# Patient Record
Sex: FEMALE | Race: WHITE | NOT HISPANIC OR LATINO | Employment: OTHER | URBAN - METROPOLITAN AREA
[De-identification: names, ages, dates, MRNs, and addresses within clinical notes are randomized per-mention and may not be internally consistent; named-entity substitution may affect disease eponyms.]

---

## 2017-03-27 ENCOUNTER — ALLSCRIPTS OFFICE VISIT (OUTPATIENT)
Dept: OTHER | Facility: OTHER | Age: 67
End: 2017-03-27

## 2017-08-28 ENCOUNTER — APPOINTMENT (OUTPATIENT)
Dept: LAB | Facility: MEDICAL CENTER | Age: 67
End: 2017-08-28
Payer: MEDICARE

## 2017-08-28 ENCOUNTER — GENERIC CONVERSION - ENCOUNTER (OUTPATIENT)
Dept: OTHER | Facility: OTHER | Age: 67
End: 2017-08-28

## 2017-08-28 DIAGNOSIS — E55.9 VITAMIN D DEFICIENCY: ICD-10-CM

## 2017-08-28 DIAGNOSIS — I10 ESSENTIAL (PRIMARY) HYPERTENSION: ICD-10-CM

## 2017-08-28 LAB
ALBUMIN SERPL BCP-MCNC: 4.2 G/DL (ref 3.5–5)
ALP SERPL-CCNC: 101 U/L (ref 46–116)
ALT SERPL W P-5'-P-CCNC: 16 U/L (ref 12–78)
ANION GAP SERPL CALCULATED.3IONS-SCNC: 7 MMOL/L (ref 4–13)
AST SERPL W P-5'-P-CCNC: 12 U/L (ref 5–45)
BASOPHILS # BLD AUTO: 0.03 THOUSANDS/ΜL (ref 0–0.1)
BASOPHILS NFR BLD AUTO: 1 % (ref 0–1)
BILIRUB SERPL-MCNC: 0.61 MG/DL (ref 0.2–1)
BUN SERPL-MCNC: 19 MG/DL (ref 5–25)
CALCIUM SERPL-MCNC: 9.3 MG/DL (ref 8.3–10.1)
CHLORIDE SERPL-SCNC: 105 MMOL/L (ref 100–108)
CHOLEST SERPL-MCNC: 159 MG/DL (ref 50–200)
CO2 SERPL-SCNC: 28 MMOL/L (ref 21–32)
CREAT SERPL-MCNC: 0.8 MG/DL (ref 0.6–1.3)
EOSINOPHIL # BLD AUTO: 0.15 THOUSAND/ΜL (ref 0–0.61)
EOSINOPHIL NFR BLD AUTO: 3 % (ref 0–6)
ERYTHROCYTE [DISTWIDTH] IN BLOOD BY AUTOMATED COUNT: 12.5 % (ref 11.6–15.1)
GFR SERPL CREATININE-BSD FRML MDRD: 76 ML/MIN/1.73SQ M
GLUCOSE P FAST SERPL-MCNC: 89 MG/DL (ref 65–99)
HCT VFR BLD AUTO: 44.3 % (ref 34.8–46.1)
HDLC SERPL-MCNC: 57 MG/DL (ref 40–60)
HGB BLD-MCNC: 14.8 G/DL (ref 11.5–15.4)
LDLC SERPL CALC-MCNC: 77 MG/DL (ref 0–100)
LYMPHOCYTES # BLD AUTO: 2.14 THOUSANDS/ΜL (ref 0.6–4.47)
LYMPHOCYTES NFR BLD AUTO: 41 % (ref 14–44)
MCH RBC QN AUTO: 31.8 PG (ref 26.8–34.3)
MCHC RBC AUTO-ENTMCNC: 33.4 G/DL (ref 31.4–37.4)
MCV RBC AUTO: 95 FL (ref 82–98)
MONOCYTES # BLD AUTO: 0.46 THOUSAND/ΜL (ref 0.17–1.22)
MONOCYTES NFR BLD AUTO: 9 % (ref 4–12)
NEUTROPHILS # BLD AUTO: 2.48 THOUSANDS/ΜL (ref 1.85–7.62)
NEUTS SEG NFR BLD AUTO: 46 % (ref 43–75)
NRBC BLD AUTO-RTO: 0 /100 WBCS
PLATELET # BLD AUTO: 229 THOUSANDS/UL (ref 149–390)
PMV BLD AUTO: 11.7 FL (ref 8.9–12.7)
POTASSIUM SERPL-SCNC: 4.3 MMOL/L (ref 3.5–5.3)
PROT SERPL-MCNC: 7.1 G/DL (ref 6.4–8.2)
RBC # BLD AUTO: 4.65 MILLION/UL (ref 3.81–5.12)
SODIUM SERPL-SCNC: 140 MMOL/L (ref 136–145)
TRIGL SERPL-MCNC: 125 MG/DL
WBC # BLD AUTO: 5.26 THOUSAND/UL (ref 4.31–10.16)

## 2017-08-28 PROCEDURE — 85025 COMPLETE CBC W/AUTO DIFF WBC: CPT

## 2017-08-28 PROCEDURE — 36415 COLL VENOUS BLD VENIPUNCTURE: CPT

## 2017-08-28 PROCEDURE — 80053 COMPREHEN METABOLIC PANEL: CPT

## 2017-08-28 PROCEDURE — 80061 LIPID PANEL: CPT

## 2017-08-29 ENCOUNTER — ALLSCRIPTS OFFICE VISIT (OUTPATIENT)
Dept: OTHER | Facility: OTHER | Age: 67
End: 2017-08-29

## 2017-08-29 ENCOUNTER — APPOINTMENT (OUTPATIENT)
Dept: RADIOLOGY | Facility: MEDICAL CENTER | Age: 67
End: 2017-08-29
Payer: MEDICARE

## 2017-08-29 DIAGNOSIS — M25.561 PAIN IN RIGHT KNEE: ICD-10-CM

## 2017-08-29 PROCEDURE — 73562 X-RAY EXAM OF KNEE 3: CPT

## 2017-09-20 ENCOUNTER — ALLSCRIPTS OFFICE VISIT (OUTPATIENT)
Dept: OTHER | Facility: OTHER | Age: 67
End: 2017-09-20

## 2017-09-27 ENCOUNTER — ALLSCRIPTS OFFICE VISIT (OUTPATIENT)
Dept: OTHER | Facility: OTHER | Age: 67
End: 2017-09-27

## 2017-12-20 ENCOUNTER — ALLSCRIPTS OFFICE VISIT (OUTPATIENT)
Dept: OTHER | Facility: OTHER | Age: 67
End: 2017-12-20

## 2017-12-21 NOTE — PROGRESS NOTES
Assessment   1  Arthralgia of right knee (719 46) (M25 561)   2  Osteoarthritis of knee (715 36) (M17 10)   3  Pes anserine bursitis (726 61) (M70 50)    Plan   Pes anserine bursitis    · MethylPREDNISolone Acetate 40 MG/ML Injection Suspension   · Follow-up visit in 3 months Evaluation and Treatment  Follow-up  Status: Hold For -    Scheduling  Requested for: 78Cxl9972    Discussion/Summary      58-year-old female right knee pain osteoarthritis pes anserine bursitis   knee pes bursa steroid injection given today   authorization for Synvisc-One injection right knee   as tolerated right lower extremity   home exercise   pending prior authorization Synvisc-One injection of the right knee   advised should they develop any increasing pain, redness, drainage, numbness, tingling or swelling surrounding the injection sight, they are to contact our office or present to the emergency department  Chief Complaint   1  Knee Pain  Right knee pain      History of Present Illness   HPI: 58-year-old female presents office today regarding right knee Patient has diagnosed with osteoarthritis of the right knee  Patient continues to have pain in her right knee pain is worse with weight-bearing she also complains of pain at night medial aspect of her right knee  Patient did receive a steroid injection 3 months ago she states having 24 hours of complete pain relief however this pain has since returned  Pain is aching in nature she denies any instability clicking popping catching in her right knee  Review of Systems        Constitutional: No fever, no chills, feels well, no tiredness, no recent weight gain or loss  Eyes: No complaints of eyesight problems, no red eyes  ENT: no loss of hearing, no nosebleeds, no sore throat  Cardiovascular: No complaints of chest pain, no palpitations, no leg claudication or lower extremity edema  Respiratory: no compliants of shortness of breath, no wheezing, no cough  Gastrointestinal: no complaints of abdominal pain, no constipation, no nausea or diarrhea, no vomiting, no bloody stools  Genitourinary: no complaints of dysuria, no incontinence  Musculoskeletal: as noted in HPI  Integumentary: no complaints of skin rash or lesion, no itching or dry skin, no skin wounds  Neurological: no complaints of headache, no confusion, no numbness or tingling, no dizziness  Endocrine: No complaints of muscle weakness, no feelings of weakness, no frequent urination, no excessive thirst       Psychiatric: No suicidal thoughts, no anxiety, no feelings of depression  Active Problems   1  Arthralgia of right knee (719 46) (M25 561)   2  Benign essential hypertension (401 1) (I10)   3  Encounter for cervical Pap smear with pelvic exam (V76 2,V72 31) (Z01 419)   4  Encounter for screening colonoscopy (V76 51) (Z12 11)   5  Encounter for screening for osteoporosis (V82 81) (Z13 820)   6  Encounter for screening mammogram for breast cancer (V76 12) (Z12 31)   7  Glaucoma screening (V80 1) (Z13 5)   8  Initial Medicare annual wellness visit (V70 0) (Z00 00)   9  Medicare annual wellness visit, subsequent (V70 0) (Z00 00)   10  Osteoarthritis of knee (715 36) (M17 10)   11  Osteoporosis, postmenopausal (733 01) (M81 0)   12  Screening for genitourinary condition (V81 6) (Z13 89)   13  Vitamin D deficiency (268 9) (E55 9)    Past Medical History    · Denied: History of depression   · Denied: History of sexual abuse   · Denied: History of substance abuse   · History of Shin splints (844 9) (Z56 436J)   · History of Symptomatic menopausal or female climacteric states (627 2) (N95 1)     The active problems and past medical history were reviewed and updated today        Surgical History    · History of Appendectomy   · History of Total Abdominal Hysterectomy    Family History   Mother    · Denied: Family history of depression   · Denied: Family history of substance abuse  Father · Family history of CABG Three Or More Nonautogenous Grafts   · Denied: Family history of depression   · Denied: Family history of substance abuse  Brother    · Family history of Heart attack    Social History    · Former smoker (S69 34) (Q52 529)    Current Meds    1  AmLODIPine Besylate 10 MG Oral Tablet; TAKE 1 TABLET DAILY FOR BLOOD     PRESSURE; Therapy: 34FRY3572 to (Sharin Aschoff)  Requested for: 97XZY0501; Last     Rx:[id number] Ordered   2  Losartan Potassium-HCTZ 50-12 5 MG Oral Tablet; Take 1 tablet daily; Therapy: 71QMY2246 to (Sharin Aschoff)  Requested for: 76UXB5139; Last     Rx:[id number] Ordered   3  Vitamin D3 5000 UNIT Oral Capsule; take 1 capsule daily; Therapy: 59RZE7447 to Recorded    Allergies   1  Fluzone Quadrivalent 0 5 ML Intramuscular Suspension    Vitals   Signs   Heart Rate: 69  Respiration: 18  Systolic: 858  Diastolic: 75  Weight: 669 lb     Physical Exam        Constitutional - General appearance: Normal       Musculoskeletal - Gait and station: Normal -- Digits and nails: Normal -- Inspection/palpation of joints, bones, and muscles: Abnormal -- Muscle strength/tone: Normal  On examination of the right knee there is mild effusion, no erythema  Range of motion to full active extension and flexion to greater than 120Â°  Pain on palpation medial and lateral joint lines  Pain on palpation pes anserine bursa region There is crepitus with range of motion, no warmth to palpation, bony enlargement noted  No pain on palpation pes anserine bursa region or distal iliotibial band  Stable to varus and valgus stress without pain or gapping  Negative anterior and posterior drawer testing  Sensation intact distal pulses present        Cardiovascular - Pulses: Normal -- Examination of extremities for edema and/or varicosities: Normal       Skin - Skin and subcutaneous tissue: Normal       Neurologic - Sensation: Normal       Psychiatric - Orientation to person, place, and time: Normal -- Mood and affect: Normal       Eyes      Conjunctiva and lids: Normal        Pupils and irises: Normal        Procedure      Procedure: Injection of the right knee joint-- and-- pes anserine bursa  Indication:  Inflammation  Potential complications include bleeding,-- infection-- and-- allergic reaction  Risk, benefits and alternatives were discussed with the patient  Verbal consent was obtained prior to the procedure  Alcohol was used to prep the area  ethyl chloride spray was used as a topical anesthetic  Using sterile technique, the aspiration/injection needle was then directed from a direct aspect  A 22-gauge was used to inject 2 mL of 0 25% Bupivacaine-- and-- 1 mL of 40mg/mL methylprednisolone  A bandage was applied  the patient tolerated the procedure well  Complications: none  Future Appointments      Date/Time Provider Specialty Site   03/28/2018 09:00 AM FAVIAN Beard  Family Medicine [address] [place] [address]    Electronically signed by :  Sima Chua, [facility]; Dec 20 2017 10:35AM EST                       (Author)     Electronically signed by : FAVIAN Jones ; Dec 20 2017 12:41PM EST                       (Author)

## 2018-01-10 NOTE — PROGRESS NOTES
Assessment    1  Shin splints (844 9) (Y27 764L)   2  Benign essential hypertension (401 1) (I10)   3  Vitamin D deficiency (268 9) (E55 9)   4  Initial Medicare annual wellness visit (V70 0) (Z00 00)   5  Glaucoma screening (V80 1) (Z13 5)   6  Screening for genitourinary condition (V81 6) (Z13 89)    Plan  Benign essential hypertension    · AmLODIPine Besylate 10 MG Oral Tablet; TAKE 1 TABLET DAILY FOR BLOOD  PRESSURE   · Losartan Potassium-HCTZ 50-12 5 MG Oral Tablet; Take 1 tablet daily  Benign essential hypertension, Vitamin D deficiency    · (1) CBC/PLT/DIFF; Status:Active; Requested RKX:[id number];    · (1) COMPREHENSIVE METABOLIC PANEL; Status:Active; Requested BWB:[id number];    · (1) LIPID PANEL, FASTING; Status:Active; Requested for:24Hxm5471;    · (1) TSH; Status:Active; Requested for:75Ajv7945;    · (1) VITAMIN D 25-HYDROXY; Status:Active; Requested [facility]:36QWP4471;   Glaucoma screening    · [facility]aca [facility] Referral  Consult  Status: Hold For -  Scheduling  Requested for: 42PJT3503  Care Summary provided  : Yes  Screening for genitourinary condition    · *VB - Urinary Incontinence Screen (Dx Z13 89 Screen for UI); Status:Complete;   Done:  92MUU8754 08:59AM    Discussion/Summary    Exercises for shin splints, check labs, will call with resutls  Impression: Initial Annual Wellness Visit  Cardiovascular screening and counseling: screening is current and EKG recommended  Diabetes screening and counseling: screening is current  Colorectal cancer screening and counseling: the patient declines screening  Breast cancer screening and counseling: the patient declines screening  Osteoporosis screening and counseling: the patient declines screening  Glaucoma screening and counseling: ophthalmologist referral  Immunizations: the patient declines the influenza vaccination, the patient declines the pneumococcal vaccination and the patient declines the Zostavax vaccine  Advance Directive Planning: not complete  Chief Complaint  patient presented here for annual well visit      History of Present Illness  HPI: BP has been stable, occ goes up to 170-200, pt can feel it then, doesn't routinely check at home though  no CP/SOB, no h/a, no visual changes  tolerating her meds daily  no eye doctor  h/o shin splints, slightly better since retiring from factory/manufacturing  xrays were done, no stress fractures at that time, no recent trauma  uses aleve  denies UI  declines flu shot, had a reaction in the past    Welcome to [employer] and Wellness Visits: The patient is being seen for the initial annual wellness visit  Medicare Screening and Risk Factors   Hospitalizations: no previous hospitalizations and nothing in the last year  Once per lifetime medicare screening tests: ECG (NSR, no ischemia)  Medicare Screening Tests Risk Questions   Abdominal aortic aneurysm risk assessment: none indicated  Osteoporosis risk assessment: , female gender, over 48years of age, tobacco use and declines dexa  HIV risk assessment: none indicated  Drug and Alcohol Use: The patient is a former cigarette smoker and quit smoking 5 yrs ago  The patient reports never drinking alcohol  She has never used illicit drugs  Diet and Physical Activity: Current diet includes well balanced meals, 2-3 servings of dairy products per day and 3-5 cups of coffee per day  She exercises infrequently  Exercise: walking  Mood Disorder and Cognitive Impairment Screening: Anxiety screening no anxiety  Depression screening  negative for symptoms  She denies feeling down, depressed, or hopeless over the past two weeks  She denies feeling little interest or pleasure in doing things over the past two weeks     Cognitive impairment screening: denies difficulty learning/retaining new information, denies difficulty handling complex tasks, denies difficulty with reasoning, denies difficulty with spatial ability and orientation, denies difficulty with language and denies difficulty with behavior  Functional Ability/Level of Safety: Hearing is normal bilaterally and a hearing aid is not used  She denies hearing difficulties  The patient is currently able to do activities of daily living without limitations, able to do instrumental activities of daily living without limitations, able to participate in social activities without limitations and able to drive without limitations  Advance Directives: Advance directives: no living will and no advance directives  Co-Managers and Medical Equipment/Suppliers: See Patient Care Team      Review of Systems    Constitutional: negative  Active Problems    1  Benign essential hypertension (401 1) (I10)   2  Shin splints (844 9) (I93 229M)   3  Vitamin D deficiency (268 9) (E55 9)    Past Medical History    · History of Symptomatic menopausal or female climacteric states (627 2) (N95 1)    Surgical History    · History of Appendectomy   · History of Total Abdominal Hysterectomy    The surgical history was reviewed and updated today  Family History  Mother    · No pertinent family history  Father    · Family history of CABG Three Or More Nonautogenous Grafts  Brother    · Family history of Heart attack    The family history was reviewed and updated today  Social History    · Former smoker (L11 71) (U11 613)  The social history was reviewed and updated today  Current Meds   1  AmLODIPine Besylate 10 MG Oral Tablet; TAKE 1 TABLET DAILY FOR BLOOD   PRESSURE; Therapy: 91MDO4100 to (Wendy Herman)  Requested for: 25Mar2016; Last   Rx:25Mar2016 Ordered   2  Losartan Potassium-HCTZ 50-12 5 MG Oral Tablet; Take 1 tablet daily; Therapy: 79VBE4195 to (Wendy Herman)  Requested for: 25Mar2016; Last   Rx:25Mar2016 Ordered   3  Vitamin D3 5000 UNIT Oral Capsule; take 1 capsule daily;    Therapy: 97NPC0236 to Recorded    The medication list was reviewed and updated today  Allergies    1  Fluzone Quadrivalent 0 5 ML Intramuscular Suspension    Immunizations   1    PPSV  Temporarily Deferred: Pt requests deferral     Vitals  Signs    Systolic: 380  Diastolic: 80   Systolic: 566, LUE, Sitting  Diastolic: 88, LUE, Sitting  Heart Rate: 82  Pulse Quality: Normal  Respiration Quality: Normal  Respiration: 16  Temperature: 97 8 F, Tympanic  O2 Saturation: 98  Height: 5 ft 2 in  Weight: 171 lb   BMI Calculated: 31 28  BSA Calculated: 1 79    Physical Exam    Constitutional   General appearance: No acute distress, well appearing and well nourished  Eyes   Conjunctiva and lids: No swelling, erythema or discharge  Pupils and irises: Equal, round, reactive to light  Ears, Nose, Mouth, and Throat   External inspection of ears and nose: Normal     Otoscopic examination: Tympanic membranes translucent with normal light reflex  Canals patent without erythema  Hearing: Normal     Nasal mucosa, septum, and turbinates: Normal without edema or erythema  Lips, teeth, and gums: Normal, good dentition  Oropharynx: Normal with no erythema, edema, exudate or lesions  Neck   Neck: Supple, symmetric, trachea midline, no masses  Thyroid: Normal, no thyromegaly  Pulmonary   Respiratory effort: No increased work of breathing or signs of respiratory distress  Auscultation of lungs: Clear to auscultation  Cardiovascular   Auscultation of heart: Normal rate and rhythm, normal S1 and S2, no murmurs  Examination of extremities for edema and/or varicosities: Normal     Abdomen   Abdomen: Non-tender, no masses  Liver and spleen: No hepatomegaly or splenomegaly  Lymphatic   Palpation of lymph nodes in neck: No lymphadenopathy  Musculoskeletal   Gait and station: Normal     Range of motion: Normal     Muscle strength/tone: Normal     Neurologic   Cortical function: Normal mental status  Reflexes: 2+ and symmetric      Psychiatric   Judgment and insight: Normal     Orientation to person, place, and time: Normal     Recent and remote memory: Intact  Mood and affect: Normal        Results/Data  A 12 lead ECG was performed and was normal  No acute ischemia  Procedure    Procedure:   Results: 20/20 in both eyes with corrective device, 20/70 in the right eye with corrective device, 20/20 in the left eye with corrective device normal in both eyes        Signatures   Electronically signed by : FAVIAN Stevenson ; Sep 26 2016  9:06AM EST                       (Author)

## 2018-01-13 VITALS
BODY MASS INDEX: 32.27 KG/M2 | WEIGHT: 175.38 LBS | OXYGEN SATURATION: 98 % | RESPIRATION RATE: 16 BRPM | DIASTOLIC BLOOD PRESSURE: 88 MMHG | HEIGHT: 62 IN | HEART RATE: 78 BPM | TEMPERATURE: 97.7 F | SYSTOLIC BLOOD PRESSURE: 144 MMHG

## 2018-01-13 VITALS
HEART RATE: 76 BPM | TEMPERATURE: 97.2 F | BODY MASS INDEX: 31.56 KG/M2 | SYSTOLIC BLOOD PRESSURE: 146 MMHG | RESPIRATION RATE: 16 BRPM | HEIGHT: 62 IN | OXYGEN SATURATION: 98 % | WEIGHT: 171.5 LBS | DIASTOLIC BLOOD PRESSURE: 90 MMHG

## 2018-01-14 VITALS
WEIGHT: 169.5 LBS | RESPIRATION RATE: 18 BRPM | SYSTOLIC BLOOD PRESSURE: 146 MMHG | HEART RATE: 72 BPM | HEIGHT: 62 IN | BODY MASS INDEX: 31.19 KG/M2 | DIASTOLIC BLOOD PRESSURE: 78 MMHG

## 2018-01-15 VITALS
HEIGHT: 62 IN | BODY MASS INDEX: 30.76 KG/M2 | OXYGEN SATURATION: 98 % | RESPIRATION RATE: 16 BRPM | TEMPERATURE: 97.8 F | WEIGHT: 167.13 LBS | SYSTOLIC BLOOD PRESSURE: 134 MMHG | HEART RATE: 82 BPM | DIASTOLIC BLOOD PRESSURE: 72 MMHG

## 2018-01-16 NOTE — RESULT NOTES
Verified Results  (1) CBC/PLT/DIFF 58Yua4801 08:21AM Destiny Renteria Order Number: [id number]     Test Name Result Flag Reference   WBC COUNT 5 26 Thousand/uL  4 31-10 16   RBC COUNT 4 65 Million/uL  3 81-5 12   HEMOGLOBIN 14 8 g/dL  11 5-15 4   HEMATOCRIT 44 3 %  34 8-46  1   MCV 95 fL  82-98   MCH 31 8 pg  26 8-34 3   MCHC 33 4 g/dL  31 4-37 4   RDW 12 5 %  11 6-15 1   MPV 11 7 fL  8 9-12 7   PLATELET COUNT 081 Thousands/uL  149-390   nRBC AUTOMATED 0 /100 WBCs     NEUTROPHILS RELATIVE PERCENT 46 %  43-75   LYMPHOCYTES RELATIVE PERCENT 41 %  14-44   MONOCYTES RELATIVE PERCENT 9 %  4-12   EOSINOPHILS RELATIVE PERCENT 3 %  0-6   BASOPHILS RELATIVE PERCENT 1 %  0-1   NEUTROPHILS ABSOLUTE COUNT 2 48 Thousands/? ??L  1 85-7 62   LYMPHOCYTES ABSOLUTE COUNT 2 14 Thousands/? ??L  0 60-4 47   MONOCYTES ABSOLUTE COUNT 0 46 Thousand/? ??L  0 17-1 22   EOSINOPHILS ABSOLUTE COUNT 0 15 Thousand/? ??L  0 00-0 61   BASOPHILS ABSOLUTE COUNT 0 03 Thousands/? ??L  0 00-0 10   - Patient Instructions: This bloodwork is non-fasting  Please drink two glasses of water morning of bloodwork  (1) COMPREHENSIVE METABOLIC PANEL [id number] 19:34NV Destiny Renteria Order Number: [id number]     Test Name Result Flag Reference   SODIUM 140 mmol/L  136-145   POTASSIUM 4 3 mmol/L  3 5-5 3   CHLORIDE 105 mmol/L  100-108   CARBON DIOXIDE 28 mmol/L  21-32   ANION GAP (CALC) 7 mmol/L  4-13   BLOOD UREA NITROGEN 19 mg/dL  5-25   CREATININE 0 80 mg/dL  0 60-1 30   Standardized to IDMS reference method   CALCIUM 9 3 mg/dL  8 3-10 1   BILI, TOTAL 0 61 mg/dL  0 20-1 00   ALK PHOSPHATAS 101 U/L     ALT (SGPT) 16 U/L  12-78   Specimen collection should occur prior to Sulfasalazine and/or Sulfapyridine administration due to the potential for falsely depressed results  AST(SGOT) 12 U/L  5-45   Specimen collection should occur prior to Sulfasalazine administration due to the potential for falsely depressed results  ALBUMIN 4 2 g/dL  3 5-5 0   TOTAL PROTEIN 7 1 g/dL  6 4-8 2   eGFR 76 ml/min/1 73sq m     National Kidney Disease Education Program recommendations are as follows:  GFR calculation is accurate only with a steady state creatinine  Chronic Kidney disease less than 60 ml/min/1 73 sq  meters  Kidney failure less than 15 ml/min/1 73 sq  meters  GLUCOSE FASTING 89 mg/dL  65-99   Specimen collection should occur prior to Sulfasalazine administration due to the potential for falsely depressed results  Specimen collection should occur prior to Sulfapyridine administration due to the potential for falsely elevated results  (1) LIPID PANEL, FASTING 70Fhr2537 08:21AM Thor Counter Order Number: [id number]     Test Name Result Flag Reference   CHOLESTEROL 159 mg/dL     HDL,DIRECT 57 mg/dL  40-60   Specimen collection should occur prior to Metamizole administration due to the potential for falsley depressed results  LDL CHOLESTEROL CALCULATED 77 mg/dL  0-100   - Patient Instructions: This is a fasting blood test  Water,black tea or black  coffee only after 9:00pm the night before test   Drink 2 glasses of water the morning of test       Triglyceride:        Normal ??? ??? ??? ??? ??? ??? ??? <150 mg/dl   ??? ??? ???Borderline High ??? ??? 150-199 mg/dl   ??? ??? ? ?? High ??? ??? ??? ??? ??? ??? ??? 200-499 mg/dl   ??? ??? ? ??Very High ??? ??? ??? ??? ??? >499 mg/dl      Cholesterol:       Desirable ??? ??? ??? ??? <200 mg/dl   ??? ??? Borderline High ??? 200-239 mg/dl   ??? ??? High ??? ??? ??? ??? ??? ??? >239 mg/dl      HDL Cholesterol:       High ??? ???>59 mg/dL   ??? ??? Low ??? ??? <41 mg/dL      This screening LDL is a calculated result  It does not have the accuracy of the Direct Measured LDL in the monitoring of patients with hyperlipidemia and/or statin therapy  Direct Measure LDL (JGA746) must be ordered separately in these patients     TRIGLYCERIDES 125 mg/dL  <=150   Specimen collection should occur prior to N-Acetylcysteine or Metamizole administration due to the potential for falsely depressed results

## 2018-01-23 VITALS
DIASTOLIC BLOOD PRESSURE: 75 MMHG | HEART RATE: 69 BPM | WEIGHT: 168 LBS | SYSTOLIC BLOOD PRESSURE: 146 MMHG | RESPIRATION RATE: 18 BRPM | BODY MASS INDEX: 30.73 KG/M2

## 2018-02-27 DIAGNOSIS — I10 ESSENTIAL HYPERTENSION: Primary | ICD-10-CM

## 2018-02-28 ENCOUNTER — OFFICE VISIT (OUTPATIENT)
Dept: OBGYN CLINIC | Facility: MEDICAL CENTER | Age: 68
End: 2018-02-28
Payer: MEDICARE

## 2018-02-28 VITALS
HEART RATE: 77 BPM | WEIGHT: 166 LBS | BODY MASS INDEX: 30.36 KG/M2 | SYSTOLIC BLOOD PRESSURE: 151 MMHG | DIASTOLIC BLOOD PRESSURE: 81 MMHG | RESPIRATION RATE: 18 BRPM

## 2018-02-28 DIAGNOSIS — M17.11 PRIMARY OSTEOARTHRITIS OF RIGHT KNEE: Primary | ICD-10-CM

## 2018-02-28 PROCEDURE — 99213 OFFICE O/P EST LOW 20 MIN: CPT | Performed by: ORTHOPAEDIC SURGERY

## 2018-02-28 PROCEDURE — 20610 DRAIN/INJ JOINT/BURSA W/O US: CPT | Performed by: PHYSICIAN ASSISTANT

## 2018-02-28 RX ORDER — LOSARTAN POTASSIUM AND HYDROCHLOROTHIAZIDE 12.5; 5 MG/1; MG/1
TABLET ORAL
Qty: 90 TABLET | Refills: 1 | Status: SHIPPED | OUTPATIENT
Start: 2018-02-28 | End: 2018-04-20 | Stop reason: ALTCHOICE

## 2018-02-28 RX ORDER — LOSARTAN POTASSIUM AND HYDROCHLOROTHIAZIDE 12.5; 5 MG/1; MG/1
1 TABLET ORAL DAILY
COMMUNITY
Start: 2012-05-21 | End: 2018-02-28 | Stop reason: SDUPTHER

## 2018-02-28 RX ORDER — AMLODIPINE BESYLATE 10 MG/1
1 TABLET ORAL DAILY
COMMUNITY
Start: 2012-05-21 | End: 2018-05-17 | Stop reason: SDUPTHER

## 2018-02-28 RX ADMIN — LIDOCAINE HYDROCHLORIDE 2 ML: 10 INJECTION, SOLUTION INFILTRATION; PERINEURAL at 10:30

## 2018-02-28 NOTE — PROGRESS NOTES
Orthopedics          Anne-Marie Malika 69 y o  female MRN: [id number]      Chief Complaint:   right knee pain    HPI:   69 y  o female complaining of right knee pain and osteoarthritis  Patient states she continues to have pain in her right knee pain is worse with weight-bearing worse after standing for long periods of time  Pain is localized to her right knee  She denies any significant numbness tingling instability right knee  She has been diagnosed with osteoarthritis in the past   She did have intra-articular steroid injection pes bursa injection 3 months ago with only 24 hours pain relief  Patient has continued home exercises for her right knee  She denies any numbness tingling instability  Review Of Systems:   · Skin: Normal  · Neuro: See HPI  · Musculoskeletal: See HPI  · 14 point review of systems negative except as stated above     Past Medical History:   History reviewed  No pertinent past medical history  Past Surgical History:   History reviewed  No pertinent surgical history      Family History:  Family history reviewed and non-contributory  Family History   Problem Relation Age of Onset    No Known Problems Mother     Heart disease Father        Social History:  Social History     Social History    Marital status: /Civil Union     Spouse name: N/A    Number of children: N/A    Years of education: N/A     Social History Main Topics    Smoking status: Former Smoker    Smokeless tobacco: Never Used    Alcohol use None    Drug use: Unknown    Sexual activity: Not Asked     Other Topics Concern    None     Social History Narrative    None       Allergies:   No Known Allergies    Labs:    0  Lab Value Date/Time   HCT 44 3 08/28/2017 0821   HCT 43 8 03/30/2016 0800   HCT 42 3 05/16/2015 1158   HGB 14 8 08/28/2017 0821   HGB 14 9 03/30/2016 0800   HGB 14 1 05/16/2015 1158   WBC 5 26 08/28/2017 0821   WBC 5 15 03/30/2016 0800   WBC 5 19 05/16/2015 1158 Meds:    Current Outpatient Prescriptions:     amLODIPine (NORVASC) 10 mg tablet, Take 1 tablet by mouth daily, Disp: , Rfl:     Cholecalciferol (VITAMIN D-3) 5000 units TABS, Take 1 capsule by mouth daily, Disp: , Rfl:     losartan-hydrochlorothiazide (HYZAAR) 50-12 5 mg per tablet, Take 1 tablet by mouth daily, Disp: , Rfl:       Physical Exam:     General Appearance:    Alert, cooperative, no distress, appears stated age   Head:    Normocephalic, without obvious abnormality, atraumatic   Eyes:    conjunctiva/corneas clear, EOM's intact, fundi     benign, both eyes   Ears:    Normal external ear canals, both ears   Nose:   Nares normal, septum midline, mucosa normal, no drainage    Throat:   Lips, mucosa, and tongue normal; teeth and gums normal   Neck:   Supple, symmetrical, trachea midline, no adenopathy;     thyroid:  no enlargement/tenderness/nodules; no carotid    bruit or JVD   Back:     Symmetric, no curvature, ROM normal, no CVA tenderness   Lungs:     Clear to auscultation bilaterally, respirations unlabored   Chest Wall:    No tenderness or deformity    Heart:    Regular rate and rhythm       Abdomen:     Soft, non-tender,        Pulses:   2+ and symmetric all extremities   Skin:   Skin color, texture, turgor normal, no rashes or lesions   Neurologic:   normal strength, sensation and reflexes     throughout       Musculoskeletal: right lower extremity  · On examination of the right knee there is no effusion, no erythema  Range of motion to full active extension and flexion to greater than 120°  Pain on palpation medial and lateral joint lines  There is crepitus with range of motion, no warmth to palpation, bony enlargement noted  No pain on palpation pes anserine bursa region or distal iliotibial band  Stable to varus and valgus stress without pain or gapping  Negative anterior and posterior drawer testing  Sensation intact distal pulses present          Large joint arthrocentesis  Date/Time: 2/28/2018 10:30 AM  Consent given by: patient  Site marked: site marked  Supporting Documentation  Indications: pain   Procedure Details  Location: knee - R knee  Preparation: Patient was prepped and draped in the usual sterile fashion  Needle size: 18 G  Ultrasound guidance: no  Approach: superior  Medications administered: 2 mL lidocaine 1 %; 48 mg hylan 48 MG/6ML            _*_*_*_*_*_*_*_*_*_*_*_*_*_*_*_*_*_*_*_*_*_*_*_*_*_*_*_*_*_*_*_*_*_*_*_*_*_*_*_*_*    Assessment:  69 y  o female with right knee pain osteoarthritis    Plan:   · Weight bearing as tolerated  right lower extremity  · Right knee Synvisc-One injection given to right knee today  · Advised no significant activity or increased ambulation over the next 24-48 hours and warm compresses to the knee no greater 20 minutes 3-4 times 24 hours following the injection  Patient advised should they develop any increasing pain, redness, drainage, numbness, tingling or swelling surrounding the injection sight, they are to contact our office or present to the emergency department  · Follow up in 3 months or sooner if needed    ·     Roger Hill PA-C          I have reviewed the notes, assessments, and/or procedures performed by Roger Hill PA-C, I concur with her/his documentation of Manuel Cannon

## 2018-03-01 RX ORDER — LIDOCAINE HYDROCHLORIDE 10 MG/ML
2 INJECTION, SOLUTION INFILTRATION; PERINEURAL
Status: DISCONTINUED | OUTPATIENT
Start: 2018-02-28 | End: 2018-03-26 | Stop reason: HOSPADM

## 2018-03-22 ENCOUNTER — HOSPITAL ENCOUNTER (INPATIENT)
Facility: HOSPITAL | Age: 68
LOS: 4 days | Discharge: NURSING, REHAB OR LONG-TERM CARE | DRG: 481 | End: 2018-03-26
Attending: EMERGENCY MEDICINE | Admitting: ORTHOPAEDIC SURGERY
Payer: MEDICARE

## 2018-03-22 ENCOUNTER — APPOINTMENT (EMERGENCY)
Dept: RADIOLOGY | Facility: HOSPITAL | Age: 68
DRG: 481 | End: 2018-03-22
Payer: MEDICARE

## 2018-03-22 DIAGNOSIS — S72.002A CLOSED FRACTURE OF LEFT HIP, INITIAL ENCOUNTER (HCC): ICD-10-CM

## 2018-03-22 DIAGNOSIS — S72.142A CLOSED INTERTROCHANTERIC FRACTURE OF LEFT HIP, INITIAL ENCOUNTER (HCC): Primary | ICD-10-CM

## 2018-03-22 PROBLEM — I10 ESSENTIAL HYPERTENSION: Status: ACTIVE | Noted: 2018-03-22

## 2018-03-22 PROBLEM — Z01.818 PREOPERATIVE CLEARANCE: Status: ACTIVE | Noted: 2018-03-22

## 2018-03-22 PROBLEM — E87.6 HYPOKALEMIA: Status: ACTIVE | Noted: 2018-03-22

## 2018-03-22 LAB
ABO GROUP BLD: NORMAL
ANION GAP SERPL CALCULATED.3IONS-SCNC: 10 MMOL/L (ref 4–13)
ATRIAL RATE: 61 BPM
ATRIAL RATE: 70 BPM
ATRIAL RATE: 74 BPM
BASOPHILS # BLD AUTO: 0.03 THOUSANDS/ΜL (ref 0–0.1)
BASOPHILS NFR BLD AUTO: 0 % (ref 0–1)
BLD GP AB SCN SERPL QL: NEGATIVE
BUN SERPL-MCNC: 17 MG/DL (ref 5–25)
CALCIUM SERPL-MCNC: 9.2 MG/DL (ref 8.3–10.1)
CHLORIDE SERPL-SCNC: 103 MMOL/L (ref 100–108)
CO2 SERPL-SCNC: 30 MMOL/L (ref 21–32)
CREAT SERPL-MCNC: 0.95 MG/DL (ref 0.6–1.3)
EOSINOPHIL # BLD AUTO: 0.06 THOUSAND/ΜL (ref 0–0.61)
EOSINOPHIL NFR BLD AUTO: 1 % (ref 0–6)
ERYTHROCYTE [DISTWIDTH] IN BLOOD BY AUTOMATED COUNT: 11.9 % (ref 11.6–15.1)
GFR SERPL CREATININE-BSD FRML MDRD: 61 ML/MIN/1.73SQ M
GLUCOSE SERPL-MCNC: 119 MG/DL (ref 65–140)
HCT VFR BLD AUTO: 39 % (ref 34.8–46.1)
HGB BLD-MCNC: 13.3 G/DL (ref 11.5–15.4)
HOLD SPECIMEN: NORMAL
LYMPHOCYTES # BLD AUTO: 1.75 THOUSANDS/ΜL (ref 0.6–4.47)
LYMPHOCYTES NFR BLD AUTO: 16 % (ref 14–44)
MCH RBC QN AUTO: 31.1 PG (ref 26.8–34.3)
MCHC RBC AUTO-ENTMCNC: 34.1 G/DL (ref 31.4–37.4)
MCV RBC AUTO: 91 FL (ref 82–98)
MONOCYTES # BLD AUTO: 0.65 THOUSAND/ΜL (ref 0.17–1.22)
MONOCYTES NFR BLD AUTO: 6 % (ref 4–12)
NEUTROPHILS # BLD AUTO: 8.41 THOUSANDS/ΜL (ref 1.85–7.62)
NEUTS SEG NFR BLD AUTO: 77 % (ref 43–75)
P AXIS: 58 DEGREES
P AXIS: 74 DEGREES
P AXIS: 79 DEGREES
PLATELET # BLD AUTO: 201 THOUSANDS/UL (ref 149–390)
PMV BLD AUTO: 11 FL (ref 8.9–12.7)
POTASSIUM SERPL-SCNC: 3.2 MMOL/L (ref 3.5–5.3)
PR INTERVAL: 234 MS
PR INTERVAL: 248 MS
PR INTERVAL: 250 MS
QRS AXIS: 18 DEGREES
QRS AXIS: 24 DEGREES
QRS AXIS: 8 DEGREES
QRSD INTERVAL: 66 MS
QRSD INTERVAL: 70 MS
QRSD INTERVAL: 72 MS
QT INTERVAL: 368 MS
QT INTERVAL: 400 MS
QT INTERVAL: 436 MS
QTC INTERVAL: 432 MS
QTC INTERVAL: 438 MS
QTC INTERVAL: 472 MS
RBC # BLD AUTO: 4.27 MILLION/UL (ref 3.81–5.12)
RH BLD: POSITIVE
SODIUM SERPL-SCNC: 143 MMOL/L (ref 136–145)
SPECIMEN EXPIRATION DATE: NORMAL
T WAVE AXIS: 2 DEGREES
T WAVE AXIS: 28 DEGREES
T WAVE AXIS: 49 DEGREES
TROPONIN I SERPL-MCNC: <0.02 NG/ML
VENTRICULAR RATE: 61 BPM
VENTRICULAR RATE: 70 BPM
VENTRICULAR RATE: 99 BPM
WBC # BLD AUTO: 10.9 THOUSAND/UL (ref 4.31–10.16)

## 2018-03-22 PROCEDURE — 96376 TX/PRO/DX INJ SAME DRUG ADON: CPT

## 2018-03-22 PROCEDURE — 71045 X-RAY EXAM CHEST 1 VIEW: CPT

## 2018-03-22 PROCEDURE — 96374 THER/PROPH/DIAG INJ IV PUSH: CPT

## 2018-03-22 PROCEDURE — 99285 EMERGENCY DEPT VISIT HI MDM: CPT

## 2018-03-22 PROCEDURE — 93005 ELECTROCARDIOGRAM TRACING: CPT

## 2018-03-22 PROCEDURE — 86850 RBC ANTIBODY SCREEN: CPT | Performed by: PHYSICIAN ASSISTANT

## 2018-03-22 PROCEDURE — 73502 X-RAY EXAM HIP UNI 2-3 VIEWS: CPT

## 2018-03-22 PROCEDURE — 80048 BASIC METABOLIC PNL TOTAL CA: CPT | Performed by: EMERGENCY MEDICINE

## 2018-03-22 PROCEDURE — 86900 BLOOD TYPING SEROLOGIC ABO: CPT | Performed by: PHYSICIAN ASSISTANT

## 2018-03-22 PROCEDURE — 93010 ELECTROCARDIOGRAM REPORT: CPT | Performed by: INTERNAL MEDICINE

## 2018-03-22 PROCEDURE — 93005 ELECTROCARDIOGRAM TRACING: CPT | Performed by: PHYSICIAN ASSISTANT

## 2018-03-22 PROCEDURE — 96375 TX/PRO/DX INJ NEW DRUG ADDON: CPT

## 2018-03-22 PROCEDURE — 99221 1ST HOSP IP/OBS SF/LOW 40: CPT | Performed by: FAMILY MEDICINE

## 2018-03-22 PROCEDURE — 86901 BLOOD TYPING SEROLOGIC RH(D): CPT | Performed by: PHYSICIAN ASSISTANT

## 2018-03-22 PROCEDURE — 99222 1ST HOSP IP/OBS MODERATE 55: CPT | Performed by: PHYSICIAN ASSISTANT

## 2018-03-22 PROCEDURE — 84484 ASSAY OF TROPONIN QUANT: CPT | Performed by: EMERGENCY MEDICINE

## 2018-03-22 PROCEDURE — 85025 COMPLETE CBC W/AUTO DIFF WBC: CPT | Performed by: EMERGENCY MEDICINE

## 2018-03-22 PROCEDURE — 36415 COLL VENOUS BLD VENIPUNCTURE: CPT | Performed by: EMERGENCY MEDICINE

## 2018-03-22 RX ORDER — OXYCODONE HYDROCHLORIDE 10 MG/1
10 TABLET ORAL EVERY 4 HOURS PRN
Status: DISCONTINUED | OUTPATIENT
Start: 2018-03-22 | End: 2018-03-26 | Stop reason: HOSPADM

## 2018-03-22 RX ORDER — OXYCODONE HYDROCHLORIDE 5 MG/1
5 TABLET ORAL EVERY 4 HOURS PRN
Status: DISCONTINUED | OUTPATIENT
Start: 2018-03-22 | End: 2018-03-26 | Stop reason: HOSPADM

## 2018-03-22 RX ORDER — DOCUSATE SODIUM 100 MG/1
100 CAPSULE, LIQUID FILLED ORAL 2 TIMES DAILY
Status: DISCONTINUED | OUTPATIENT
Start: 2018-03-22 | End: 2018-03-26 | Stop reason: HOSPADM

## 2018-03-22 RX ORDER — MORPHINE SULFATE 4 MG/ML
4 INJECTION, SOLUTION INTRAMUSCULAR; INTRAVENOUS ONCE
Status: COMPLETED | OUTPATIENT
Start: 2018-03-22 | End: 2018-03-22

## 2018-03-22 RX ORDER — SENNOSIDES 8.6 MG
1 TABLET ORAL DAILY
Status: DISCONTINUED | OUTPATIENT
Start: 2018-03-22 | End: 2018-03-26 | Stop reason: HOSPADM

## 2018-03-22 RX ORDER — AMLODIPINE BESYLATE 10 MG/1
10 TABLET ORAL DAILY
Status: DISCONTINUED | OUTPATIENT
Start: 2018-03-22 | End: 2018-03-26 | Stop reason: HOSPADM

## 2018-03-22 RX ORDER — PANTOPRAZOLE SODIUM 40 MG/1
40 TABLET, DELAYED RELEASE ORAL
Status: DISCONTINUED | OUTPATIENT
Start: 2018-03-23 | End: 2018-03-26 | Stop reason: HOSPADM

## 2018-03-22 RX ORDER — ONDANSETRON 2 MG/ML
4 INJECTION INTRAMUSCULAR; INTRAVENOUS EVERY 6 HOURS PRN
Status: DISCONTINUED | OUTPATIENT
Start: 2018-03-22 | End: 2018-03-26 | Stop reason: HOSPADM

## 2018-03-22 RX ORDER — NICOTINE 21 MG/24HR
1 PATCH, TRANSDERMAL 24 HOURS TRANSDERMAL DAILY
Status: DISCONTINUED | OUTPATIENT
Start: 2018-03-22 | End: 2018-03-26 | Stop reason: HOSPADM

## 2018-03-22 RX ORDER — ACETAMINOPHEN 325 MG/1
650 TABLET ORAL EVERY 4 HOURS PRN
Status: DISCONTINUED | OUTPATIENT
Start: 2018-03-22 | End: 2018-03-26 | Stop reason: HOSPADM

## 2018-03-22 RX ORDER — CALCIUM CARBONATE 200(500)MG
1000 TABLET,CHEWABLE ORAL DAILY PRN
Status: DISCONTINUED | OUTPATIENT
Start: 2018-03-22 | End: 2018-03-26 | Stop reason: HOSPADM

## 2018-03-22 RX ORDER — SODIUM CHLORIDE 9 MG/ML
75 INJECTION, SOLUTION INTRAVENOUS CONTINUOUS
Status: DISCONTINUED | OUTPATIENT
Start: 2018-03-23 | End: 2018-03-25

## 2018-03-22 RX ORDER — ONDANSETRON 2 MG/ML
4 INJECTION INTRAMUSCULAR; INTRAVENOUS ONCE
Status: COMPLETED | OUTPATIENT
Start: 2018-03-22 | End: 2018-03-22

## 2018-03-22 RX ORDER — POTASSIUM CHLORIDE 20 MEQ/1
40 TABLET, EXTENDED RELEASE ORAL ONCE
Status: COMPLETED | OUTPATIENT
Start: 2018-03-22 | End: 2018-03-22

## 2018-03-22 RX ORDER — MORPHINE SULFATE 4 MG/ML
4 INJECTION, SOLUTION INTRAMUSCULAR; INTRAVENOUS EVERY 4 HOURS PRN
Status: DISCONTINUED | OUTPATIENT
Start: 2018-03-22 | End: 2018-03-26

## 2018-03-22 RX ADMIN — MORPHINE SULFATE 4 MG: 4 INJECTION INTRAVENOUS at 11:57

## 2018-03-22 RX ADMIN — POTASSIUM CHLORIDE 40 MEQ: 1500 TABLET, EXTENDED RELEASE ORAL at 19:47

## 2018-03-22 RX ADMIN — ONDANSETRON 4 MG: 2 INJECTION INTRAMUSCULAR; INTRAVENOUS at 11:05

## 2018-03-22 RX ADMIN — OXYCODONE HYDROCHLORIDE 10 MG: 10 TABLET ORAL at 19:17

## 2018-03-22 RX ADMIN — MORPHINE SULFATE 4 MG: 4 INJECTION INTRAVENOUS at 11:05

## 2018-03-22 NOTE — H&P
Orthopedics   Stevenson Carvalho 69 y o  female MRN: [id number]  Unit/Bed#: AN XRAY      Chief Complaint:   Left hip pain    HPI:   69 y  o female presented to the emergency department with left hip pain after mechanical fall  Patient states she slipped on ice and fell while getting out of her car this morning  She states she fell directly onto her left hip  She denies hitting her head or loss of consciousness  After the fall she noted severe tentative 10 left hip pain and inability to bear weight on the left lower extremity  She presented to the emergency department where x-rays revealed a left hip fracture  Currently her pain is well controlled  Pain is worse with movement  No numbness or tingling  No fevers or chills  She has not utilize assistive device to ambulate      Review Of Systems:   · Skin: See HPI  · Neuro: See HPI  · Musculoskeletal: See HPI  · 14 point review of systems negative except as stated above     Past Medical History:   Past Medical History:   Diagnosis Date    Hypertension     Shin splints     last assessed 03/27/17    Symptomatic menopausal or female climacteric states     last assessed 08/26/13       Past Surgical History:   Past Surgical History:   Procedure Laterality Date    APPENDECTOMY      TOTAL ABDOMINAL HYSTERECTOMY         Family History:  Family history reviewed and non-contributory  Family History   Problem Relation Age of Onset    No Known Problems Mother     Heart disease Father      CABG three or more nonautogenous grafts    Heart attack Brother        Social History:  Social History     Social History    Marital status: /Civil Union     Spouse name: N/A    Number of children: N/A    Years of education: N/A     Social History Main Topics    Smoking status: Current Some Day Smoker    Smokeless tobacco: Never Used    Alcohol use No    Drug use: No    Sexual activity: Not Asked     Other Topics Concern    None     Social History Narrative    None       Allergies:   No Known Allergies        Labs:    0  Lab Value Date/Time   HCT 39 0 03/22/2018 1104   HCT 44 3 08/28/2017 0821   HCT 43 8 03/30/2016 0800   HCT 42 3 05/16/2015 1158   HGB 13 3 03/22/2018 1104   HGB 14 8 08/28/2017 0821   HGB 14 9 03/30/2016 0800   HGB 14 1 05/16/2015 1158   WBC 10 90 (H) 03/22/2018 1104   WBC 5 26 08/28/2017 0821   WBC 5 15 03/30/2016 0800   WBC 5 19 05/16/2015 1158       Meds:    Current Facility-Administered Medications:     hylan (SYNVISC ONE) injection 48 mg, 48 mg, Intra-articular, , KWASI Dozier-JESUSITA, 48 mg at 02/28/18 1030    lidocaine (XYLOCAINE) 1 % injection 2 mL, 2 mL, Injection, , Summer Zhu PA-C, 2 mL at 02/28/18 1030    Current Outpatient Prescriptions:     amLODIPine (NORVASC) 10 mg tablet, Take 1 tablet by mouth daily, Disp: , Rfl:     Cholecalciferol (VITAMIN D-3) 5000 units TABS, Take 1 capsule by mouth daily, Disp: , Rfl:     losartan-hydrochlorothiazide (HYZAAR) 50-12 5 mg per tablet, TAKE 1 TABLET EVERY DAY, Disp: 90 tablet, Rfl: 1    Physical Exam:   /68 (BP Location: Right arm)   Pulse 72   Temp 97 6 °F (36 4 °C) (Oral)   Resp 18   Ht 5' 3" (1 6 m)   Wt 81 4 kg (179 lb 7 3 oz)   SpO2 96%   BMI 31 79 kg/m²   Gen: Alert and oriented to person, place, time  HEENT: EOMI, eyes clear, moist mucus membranes, hearing intact  Respiratory: Bilateral chest rise  No audible wheezing found  Cardiovascular: Regular Rate and Rhythm  Abdomen: soft nontender/nondistended  Musculoskeletal: Left Hip:  · Skin intact  No erythema ecchymosis or swelling  · Left lower extremity shortened and externally rotated slightly  · Tenderness to palpation greater trochanter  · Positive logroll test   · Motor and sensation intact left lower extremity  · 2+ DP pulse  Radiology:   I personally reviewed the films    X-rays left hip reveals intertrochanteric fracture    _*_*_*_*_*_*_*_*_*_*_*_*_*_*_*_*_*_*_*_*_*_*_*_*_*_*_*_*_*_*_*_*_*_*_*_*_*_*_*_*_*    Assessment:  69 y  o female with left hip intertrochanteric fracture status post mechanical fall    Plan:   · Admit to Orthopedic Service  · Pain Control  · NWB LLE/Bedrest   · Medical Clearance  · Type and screen ordered  · Plan for OR tomorrow consisting of intramedullary nail fixation    · NPO after midnight    Ryan Ba [place]

## 2018-03-22 NOTE — ED PROVIDER NOTES
History  Chief Complaint   Patient presents with    Fall     Pt presents via EMS after slipping on ice while getting out of car, falling and injuring left hip  No LOC, No thinners, denies any other injuries sustained  History provided by:  Patient   used: No    Fall   Associated symptoms: no abdominal pain, no back pain, no chest pain, no headaches, no nausea, no neck pain and no vomiting      Patient is a 75-year-old female presenting to emergency department after she slipped on ice and fell  Following left hip  Complains of pain  No history of hip problems  No surgeries  No fever chills  Nausea vomiting  No chest pain  No shortness of breath  No abdominal pain  No head trauma  No loss of consciousness  No neck pain  No CT or L-spine tenderness  MDM hip x-ray, baseline labs, likely admission for hip fracture        Prior to Admission Medications   Prescriptions Last Dose Informant Patient Reported? Taking?    Cholecalciferol (VITAMIN D-3) 5000 units TABS 3/22/2018 at Unknown time  Yes Yes   Sig: Take 1 capsule by mouth daily   amLODIPine (NORVASC) 10 mg tablet 3/22/2018 at Unknown time  Yes Yes   Sig: Take 1 tablet by mouth daily   losartan-hydrochlorothiazide (HYZAAR) 50-12 5 mg per tablet 3/22/2018 at Unknown time  No Yes   Sig: TAKE 1 TABLET EVERY DAY      Facility-Administered Medications Last Administration Doses Remaining   hylan (SYNVISC ONE) injection 48 mg 2/28/2018 10:30 AM    lidocaine (XYLOCAINE) 1 % injection 2 mL 2/28/2018 10:30 AM           Past Medical History:   Diagnosis Date    Hypertension     Shin splints     last assessed 03/27/17    Symptomatic menopausal or female climacteric states     last assessed 08/26/13       Past Surgical History:   Procedure Laterality Date    APPENDECTOMY      TOTAL ABDOMINAL HYSTERECTOMY         Family History   Problem Relation Age of Onset    No Known Problems Mother     Heart disease Father      CABG three or more nonautogenous grafts    Heart attack Brother      I have reviewed and agree with the history as documented  Social History   Substance Use Topics    Smoking status: Current Some Day Smoker    Smokeless tobacco: Never Used    Alcohol use No        Review of Systems   Constitutional: Negative for chills, diaphoresis and fever  Respiratory: Negative for cough, shortness of breath, wheezing and stridor  Cardiovascular: Negative for chest pain, palpitations and leg swelling  Gastrointestinal: Negative for abdominal pain, blood in stool, diarrhea, nausea and vomiting  Genitourinary: Negative for dysuria, frequency and urgency  Musculoskeletal: Negative for back pain, neck pain and neck stiffness  Skin: Negative for pallor and rash  Neurological: Negative for dizziness, syncope, weakness, light-headedness and headaches  All other systems reviewed and are negative  Physical Exam  ED Triage Vitals [03/22/18 1049]   Temperature Pulse Respirations Blood Pressure SpO2   97 6 °F (36 4 °C) 67 20 138/78 100 %      Temp Source Heart Rate Source Patient Position - Orthostatic VS BP Location FiO2 (%)   Oral Monitor Lying Right arm --      Pain Score       3           Orthostatic Vital Signs  Vitals:    03/22/18 1300 03/22/18 1419 03/22/18 1430 03/22/18 1500   BP: 138/70 130/66 146/69 128/64   Pulse: 66 70 64 68   Patient Position - Orthostatic VS:  Lying         Physical Exam   Constitutional: She is oriented to person, place, and time  She appears well-developed and well-nourished  HENT:   Head: Normocephalic and atraumatic  Eyes: Pupils are equal, round, and reactive to light  Neck: Neck supple  Cardiovascular: Normal rate, regular rhythm, normal heart sounds and intact distal pulses  Pulmonary/Chest: Effort normal and breath sounds normal  No respiratory distress  Abdominal: Soft  Bowel sounds are normal  There is no tenderness  Musculoskeletal: She exhibits deformity   She exhibits no edema  Decreased range of motion of the left hip, left hip is externally rotated and shortened   Neurological: She is alert and oriented to person, place, and time  Skin: Skin is warm and dry  Capillary refill takes less than 2 seconds  No rash noted  No erythema  Vitals reviewed  ED Medications  Medications   ondansetron (ZOFRAN) injection 4 mg (4 mg Intravenous Given 3/22/18 1105)   morphine (PF) 4 mg/mL injection 4 mg (4 mg Intravenous Given 3/22/18 1105)   morphine (PF) 4 mg/mL injection 4 mg (4 mg Intravenous Given 3/22/18 1157)       Diagnostic Studies  Results Reviewed     Procedure Component Value Units Date/Time    Troponin I [[id number]]  (Normal) Collected:  03/22/18 1104    Lab Status:  Final result Specimen:  Blood from Arm, Right Updated:  03/22/18 1413     Troponin I <0 02 ng/mL     Narrative:         [employer] Chemistry analyzer 99% cutoff is > 0 04 ng/mL in network labs    o cTnI 99% cutoff is useful only when applied to patients in the clinical setting of myocardial ischemia  o cTnI 99% cutoff should be interpreted in the context of clinical history, ECG findings and possibly cardiac imaging to establish correct diagnosis  o cTnI 99% cutoff may be suggestive but clearly not indicative of a coronary event without the clinical setting of myocardial ischemia  [place] draw [[id number]] Collected:  03/22/18 1104    Lab Status: In process Specimen:  Blood Updated:  03/22/18 1301    Narrative: The following orders were created for panel order [place] draw    Procedure                               Abnormality         Status                     ---------                               -----------         ------                     Thai Arana Top on VVQG[[id number]]                            Final result               Gold top on YJQV[[id number]]                                  In process                 Green / Black tube on HCBA[[id number]]                        Final result Please view results for these tests on the individual orders  Basic metabolic panel [[id number]]  (Abnormal) Collected:  03/22/18 1104    Lab Status:  Final result Specimen:  Blood from Arm, Right Updated:  03/22/18 1135     Sodium 143 mmol/L      Potassium 3 2 (L) mmol/L      Chloride 103 mmol/L      CO2 30 mmol/L      Anion Gap 10 mmol/L      BUN 17 mg/dL      Creatinine 0 95 mg/dL      Glucose 119 mg/dL      Calcium 9 2 mg/dL      eGFR 61 ml/min/1 73sq m     Narrative:         National Kidney Disease Education Program recommendations are as follows:  GFR calculation is accurate only with a steady state creatinine  Chronic Kidney disease less than 60 ml/min/1 73 sq  meters  Kidney failure less than 15 ml/min/1 73 sq  meters  CBC and differential [[id number]]  (Abnormal) Collected:  03/22/18 1104    Lab Status:  Final result Specimen:  Blood from Arm, Right Updated:  03/22/18 1121     WBC 10 90 (H) Thousand/uL      RBC 4 27 Million/uL      Hemoglobin 13 3 g/dL      Hematocrit 39 0 %      MCV 91 fL      MCH 31 1 pg      MCHC 34 1 g/dL      RDW 11 9 %      MPV 11 0 fL      Platelets 809 Thousands/uL      Neutrophils Relative 77 (H) %      Lymphocytes Relative 16 %      Monocytes Relative 6 %      Eosinophils Relative 1 %      Basophils Relative 0 %      Neutrophils Absolute 8 41 (H) Thousands/µL      Lymphocytes Absolute 1 75 Thousands/µL      Monocytes Absolute 0 65 Thousand/µL      Eosinophils Absolute 0 06 Thousand/µL      Basophils Absolute 0 03 Thousands/µL                  XR chest portable   Final Result by Karen Carnes MD (03/22 1246)      No acute cardiopulmonary disease              Workstation performed: [id number]         XR hip/pelv 2-3 vws left   Final Result by Karen Carnes MD (03/22 1240)      Acute intertrochanteric left femoral fracture            Workstation performed: [id number]                    Procedures  Procedures       Phone Contacts  ED Phone Contact    ED Course  ED Course as of Mar 22 1550   Thu Mar 22, 2018   1151 Will page ortho for fracture    [phone] Waiting for ortho to call back    1240 ECG shows rate of 70, sinus, normal axis, normal QRS, no significant ST or T-wave changes, normal intervals, first-degree AV block, independently interpreted  by me                                MDM  CritCare Time    Disposition  Final diagnoses:   Closed fracture of left hip, initial encounter [facility])     Time reflects when diagnosis was documented in both MDM as applicable and the Disposition within this note     Time User Action Codes Description Comment    3/22/2018 12:29 PM Liliana Healy Add [[id number]] Closed fracture of left hip, initial encounter ([facility] )     3/22/2018  1:04 PM Epimenio Pinna Add [[id number]] Closed intertrochanteric fracture of left hip, initial encounter ([facility] )     3/22/2018  1:04 PM Epimenio Pinna Modify [O44 [id number]] Closed intertrochanteric fracture of left hip, initial encounter [facility])       ED Disposition     ED Disposition Condition Comment    Admit  Case was discussed with ortho and the patient's admission status was agreed to be Admission Status: inpatient status to the service of Dr Jose Juan Walker   Follow-up Information    None       Patient's Medications   Discharge Prescriptions    No medications on file     No discharge procedures on file      ED Provider  Electronically Signed by           Dillan Singh MD  03/22/18 2590

## 2018-03-22 NOTE — PLAN OF CARE
Problem: Potential for Falls  Goal: Patient will remain free of falls  INTERVENTIONS:  - Assess patient frequently for physical needs  -  Identify cognitive and physical deficits and behaviors that affect risk of falls    -  [place] fall precautions as indicated by assessment   - Educate patient/family on patient safety including physical limitations  - Instruct patient to call for assistance with activity based on assessment  - Modify environment to reduce risk of injury  - Consider OT/PT consult to assist with strengthening/mobility    Outcome: Progressing      Problem: PAIN - ADULT  Goal: Verbalizes/displays adequate comfort level or baseline comfort level  Interventions:  - Encourage patient to monitor pain and request assistance  - Assess pain using appropriate pain scale  - Administer analgesics based on type and severity of pain and evaluate response  - Implement non-pharmacological measures as appropriate and evaluate response  - Consider cultural and social influences on pain and pain management  - Notify physician/advanced practitioner if interventions unsuccessful or patient reports new pain   Outcome: Progressing      Problem: INFECTION - ADULT  Goal: Absence or prevention of progression during hospitalization  INTERVENTIONS:  - Assess and monitor for signs and symptoms of infection  - Monitor lab/diagnostic results  - Monitor all insertion sites, i e  indwelling lines, tubes, and drains  - Monitor endotracheal (as able) and nasal secretions for changes in amount and color  - [place] appropriate cooling/warming therapies per order  - Administer medications as ordered  - Instruct and encourage patient and family to use good hand hygiene technique  - Identify and instruct in appropriate isolation precautions for identified infection/condition   Outcome: Progressing    Goal: Absence of fever/infection during neutropenic period  INTERVENTIONS:  - Monitor WBC  - Implement neutropenic guidelines   Outcome: Progressing      Problem: SAFETY ADULT  Goal: Patient will remain free of falls  INTERVENTIONS:  - Assess patient frequently for physical needs  -  Identify cognitive and physical deficits and behaviors that affect risk of falls    -  [place] fall precautions as indicated by assessment   - Educate patient/family on patient safety including physical limitations  - Instruct patient to call for assistance with activity based on assessment  - Modify environment to reduce risk of injury  - Consider OT/PT consult to assist with strengthening/mobility    Outcome: Progressing

## 2018-03-22 NOTE — ASSESSMENT & PLAN NOTE
X-ray with Acute intertrochanteric left femoral fracture  Reactive elevated wbc   -Pain medication  -DVT phrophilaxis  -NPO after Midnight  -PT OT evaluation  -discontinue [place] catheter after the procedure    According with ACC/AHA Guidelines patient is a  Risk for an intermediate risk no cardiac surgical procedure  Cardiac index score 0 risk index class I  Risk of major cardiac complication 5 1%  Metabolic equivalent 5, for intermediate risk surgical procedure

## 2018-03-22 NOTE — CONSULTS
Consult- Khushi Pagan 1950, 69 y o  female MRN: [id number]    Unit/Bed#: -01 Encounter: [id number]    Primary Care Provider: Greyson Eastman MD   Date and time admitted to hospital: 3/22/2018 10:38 AM      Consults    * Closed intertrochanteric fracture of left hip, initial encounter [facility])   Assessment & Plan    X-ray with Acute intertrochanteric left femoral fracture  Reactive elevated wbc   -Pain medication  -DVT phrophilaxis  -NPO after Midnight  -PT OT evaluation  -discontinue Blackburn catheter after the procedure    According with ACC/AHA Guidelines patient is a  Risk for an intermediate risk no cardiac surgical procedure  Cardiac index score 0 risk index class I  Risk of major cardiac complication 1 3%  Metabolic equivalent 5, for intermediate risk surgical procedure  Preoperative clearance   Assessment & Plan    See above plan        Hypokalemia   Assessment & Plan    Replace K  BMP am        Essential hypertension   Assessment & Plan    Stable  Cont CCB  Cont Hyzaar  Maintain BP < 180/90 mm hg            VTE Prophylaxis: Heparin  / sequential compression device     Recommendations for Discharge:  ·     Counseling / Coordination of Care Time: 45 minutes  Greater than 50% of total time spent on patient counseling and coordination of care  Collaboration of Care: Were Recommendations Directly Discussed with Primary Treatment Team? - Yes     History of Present Illness:    Khushi Pagan is a 69 y o  female who is originally admitted to the orthopedic service on 3/22/2018 due to left hip fracture  We are consulted for preoperative evaluation  Patient with significant past medical history of HTN  Patient was brought to the ER after fell today, she she states she was going to go to pay the electrical bill and  when she got out of the car she toook 2 step and fell down    Patient denies any headache, any numbness,  chest pain,  short of breath, palpitation, abdominal pain urinary changes before the fall and after the fall  Review of Systems:    Review of Systems   Constitutional: Negative  Negative for activity change and appetite change  Eyes: Negative  Negative for discharge and itching  Respiratory: Negative for apnea, choking and chest tightness  Endocrine: Negative  Negative for cold intolerance  Genitourinary: Negative  Negative for difficulty urinating, dyspareunia and dysuria  Past Medical and Surgical History:     Past Medical History:   Diagnosis Date    Hypertension     Shin splints     last assessed 03/27/17    Symptomatic menopausal or female climacteric states     last assessed 08/26/13       Past Surgical History:   Procedure Laterality Date    APPENDECTOMY      TOTAL ABDOMINAL HYSTERECTOMY         Meds/Allergies:    all medications and allergies reviewed    Allergies: No Known Allergies    Social History:     Marital Status: /Civil Union    Substance Use History:   History   Alcohol Use No     History   Smoking Status    Current Some Day Smoker   Smokeless Tobacco    Never Used     Comment: rarely has a cigarette     History   Drug Use No       Family History:    non-contributory    Physical Exam:     Vitals:   Blood Pressure: 131/62 (03/22/18 1749)  Pulse: 64 (03/22/18 1749)  Temperature: 98 °F (36 7 °C) (03/22/18 1749)  Temp Source: Oral (03/22/18 1749)  Respirations: 18 (03/22/18 1749)  Height: 5' 3" (160 cm) (03/22/18 1049)  Weight - Scale: 81 4 kg (179 lb 7 3 oz) (03/22/18 1049)  SpO2: 96 % (03/22/18 1749)    Physical Exam   Constitutional: She is oriented to person, place, and time  No distress  Musculoskeletal: She exhibits tenderness and deformity  Decreased range of motion of   Neurological: She is alert and oriented to person, place, and time  No cranial nerve deficit  Coordination normal    Skin: Skin is warm  She is not diaphoretic  Psychiatric: She has a normal mood and affect             Additional Data:     Lab Results: I have personally reviewed pertinent reports  Results from last 7 days  Lab Units 03/22/18  1104   WBC Thousand/uL 10 90*   HEMOGLOBIN g/dL 13 3   HEMATOCRIT % 39 0   PLATELETS Thousands/uL 201   NEUTROS PCT % 77*   LYMPHS PCT % 16   MONOS PCT % 6   EOS PCT % 1       Results from last 7 days  Lab Units 03/22/18  1104   SODIUM mmol/L 143   POTASSIUM mmol/L 3 2*   CHLORIDE mmol/L 103   CO2 mmol/L 30   BUN mg/dL 17   CREATININE mg/dL 0 95   CALCIUM mg/dL 9 2   GLUCOSE RANDOM mg/dL 119           Results from last 7 days  Lab Units 03/22/18  1104   TROPONIN I ng/mL <0 02     No results found for: HGBA1C    Imaging: I have personally reviewed pertinent reports  XR chest portable   Final Result by Rudi Litten, MD (03/22 1246)      No acute cardiopulmonary disease  Workstation performed: [id number]         XR hip/pelv 2-3 vws left   Final Result by Rudi Litten, MD (03/22 1240)      Acute intertrochanteric left femoral fracture            Workstation performed: [id number]             EKG, Pathology, and Other Studies Reviewed on Admission:   · EKG: NSR  1st degree AV block     ** Please Note: This note has been constructed using a voice recognition system   **

## 2018-03-23 ENCOUNTER — ANESTHESIA EVENT (INPATIENT)
Dept: PERIOP | Facility: HOSPITAL | Age: 68
DRG: 481 | End: 2018-03-23
Payer: MEDICARE

## 2018-03-23 ENCOUNTER — ANESTHESIA (INPATIENT)
Dept: PERIOP | Facility: HOSPITAL | Age: 68
DRG: 481 | End: 2018-03-23
Payer: MEDICARE

## 2018-03-23 ENCOUNTER — APPOINTMENT (INPATIENT)
Dept: RADIOLOGY | Facility: HOSPITAL | Age: 68
DRG: 481 | End: 2018-03-23
Payer: MEDICARE

## 2018-03-23 LAB
ANION GAP SERPL CALCULATED.3IONS-SCNC: 7 MMOL/L (ref 4–13)
APTT PPP: 29 SECONDS (ref 23–35)
BUN SERPL-MCNC: 21 MG/DL (ref 5–25)
CALCIUM SERPL-MCNC: 8 MG/DL (ref 8.3–10.1)
CHLORIDE SERPL-SCNC: 107 MMOL/L (ref 100–108)
CO2 SERPL-SCNC: 28 MMOL/L (ref 21–32)
CREAT SERPL-MCNC: 0.77 MG/DL (ref 0.6–1.3)
ERYTHROCYTE [DISTWIDTH] IN BLOOD BY AUTOMATED COUNT: 11.9 % (ref 11.6–15.1)
GFR SERPL CREATININE-BSD FRML MDRD: 79 ML/MIN/1.73SQ M
GLUCOSE SERPL-MCNC: 106 MG/DL (ref 65–140)
HCT VFR BLD AUTO: 34.5 % (ref 34.8–46.1)
HGB BLD-MCNC: 11.3 G/DL (ref 11.5–15.4)
INR PPP: 0.98 (ref 0.86–1.16)
MCH RBC QN AUTO: 31.4 PG (ref 26.8–34.3)
MCHC RBC AUTO-ENTMCNC: 32.8 G/DL (ref 31.4–37.4)
MCV RBC AUTO: 96 FL (ref 82–98)
PLATELET # BLD AUTO: 170 THOUSANDS/UL (ref 149–390)
PMV BLD AUTO: 11.1 FL (ref 8.9–12.7)
POTASSIUM SERPL-SCNC: 4.1 MMOL/L (ref 3.5–5.3)
PROTHROMBIN TIME: 13.3 SECONDS (ref 12.1–14.4)
RBC # BLD AUTO: 3.6 MILLION/UL (ref 3.81–5.12)
SODIUM SERPL-SCNC: 142 MMOL/L (ref 136–145)
WBC # BLD AUTO: 7 THOUSAND/UL (ref 4.31–10.16)

## 2018-03-23 PROCEDURE — C1769 GUIDE WIRE: HCPCS | Performed by: ORTHOPAEDIC SURGERY

## 2018-03-23 PROCEDURE — 85610 PROTHROMBIN TIME: CPT | Performed by: PHYSICIAN ASSISTANT

## 2018-03-23 PROCEDURE — C1713 ANCHOR/SCREW BN/BN,TIS/BN: HCPCS | Performed by: ORTHOPAEDIC SURGERY

## 2018-03-23 PROCEDURE — 80048 BASIC METABOLIC PNL TOTAL CA: CPT | Performed by: PHYSICIAN ASSISTANT

## 2018-03-23 PROCEDURE — 27245 TREAT THIGH FRACTURE: CPT | Performed by: ORTHOPAEDIC SURGERY

## 2018-03-23 PROCEDURE — 0QS734Z REPOSITION LEFT UPPER FEMUR WITH INTERNAL FIXATION DEVICE, PERCUTANEOUS APPROACH: ICD-10-PCS | Performed by: ORTHOPAEDIC SURGERY

## 2018-03-23 PROCEDURE — 73552 X-RAY EXAM OF FEMUR 2/>: CPT

## 2018-03-23 PROCEDURE — 85027 COMPLETE CBC AUTOMATED: CPT | Performed by: PHYSICIAN ASSISTANT

## 2018-03-23 PROCEDURE — 85730 THROMBOPLASTIN TIME PARTIAL: CPT | Performed by: PHYSICIAN ASSISTANT

## 2018-03-23 DEVICE — 11.0MM TI HELICAL BLADE 85MM-STERILE: Type: IMPLANTABLE DEVICE | Site: LEG | Status: FUNCTIONAL

## 2018-03-23 DEVICE — 5.0MM TI LOCKING SCREW W/T25 STARDRIVE 38MM F/IM NAIL-STER: Type: IMPLANTABLE DEVICE | Site: LEG | Status: FUNCTIONAL

## 2018-03-23 DEVICE — 11MM/130 DEG TI CANN TROCH FIXATION NAIL 340MM/LEFT-STER: Type: IMPLANTABLE DEVICE | Site: LEG | Status: FUNCTIONAL

## 2018-03-23 RX ORDER — BUPIVACAINE HYDROCHLORIDE 5 MG/ML
INJECTION, SOLUTION PERINEURAL AS NEEDED
Status: DISCONTINUED | OUTPATIENT
Start: 2018-03-23 | End: 2018-03-23 | Stop reason: SURG

## 2018-03-23 RX ORDER — ONDANSETRON 2 MG/ML
4 INJECTION INTRAMUSCULAR; INTRAVENOUS ONCE AS NEEDED
Status: DISCONTINUED | OUTPATIENT
Start: 2018-03-23 | End: 2018-03-23 | Stop reason: HOSPADM

## 2018-03-23 RX ORDER — FENTANYL CITRATE 50 UG/ML
INJECTION, SOLUTION INTRAMUSCULAR; INTRAVENOUS AS NEEDED
Status: DISCONTINUED | OUTPATIENT
Start: 2018-03-23 | End: 2018-03-23 | Stop reason: SURG

## 2018-03-23 RX ORDER — PROPOFOL 10 MG/ML
INJECTION, EMULSION INTRAVENOUS AS NEEDED
Status: DISCONTINUED | OUTPATIENT
Start: 2018-03-23 | End: 2018-03-23 | Stop reason: SURG

## 2018-03-23 RX ORDER — PROPOFOL 10 MG/ML
INJECTION, EMULSION INTRAVENOUS CONTINUOUS PRN
Status: DISCONTINUED | OUTPATIENT
Start: 2018-03-23 | End: 2018-03-23 | Stop reason: SURG

## 2018-03-23 RX ORDER — FENTANYL CITRATE/PF 50 MCG/ML
25 SYRINGE (ML) INJECTION
Status: DISCONTINUED | OUTPATIENT
Start: 2018-03-23 | End: 2018-03-23 | Stop reason: HOSPADM

## 2018-03-23 RX ORDER — EPHEDRINE SULFATE 50 MG/ML
INJECTION, SOLUTION INTRAVENOUS AS NEEDED
Status: DISCONTINUED | OUTPATIENT
Start: 2018-03-23 | End: 2018-03-23 | Stop reason: SURG

## 2018-03-23 RX ORDER — SODIUM CHLORIDE 9 MG/ML
50 INJECTION, SOLUTION INTRAVENOUS CONTINUOUS
Status: DISCONTINUED | OUTPATIENT
Start: 2018-03-23 | End: 2018-03-25

## 2018-03-23 RX ORDER — MEPERIDINE HYDROCHLORIDE 25 MG/ML
12.5 INJECTION INTRAMUSCULAR; INTRAVENOUS; SUBCUTANEOUS
Status: DISCONTINUED | OUTPATIENT
Start: 2018-03-23 | End: 2018-03-23 | Stop reason: HOSPADM

## 2018-03-23 RX ADMIN — FENTANYL CITRATE 15 MCG: 50 INJECTION INTRAMUSCULAR; INTRAVENOUS at 15:07

## 2018-03-23 RX ADMIN — CEFAZOLIN SODIUM 2000 MG: 2 SOLUTION INTRAVENOUS at 15:06

## 2018-03-23 RX ADMIN — SODIUM CHLORIDE: 0.9 INJECTION, SOLUTION INTRAVENOUS at 16:07

## 2018-03-23 RX ADMIN — SODIUM CHLORIDE 75 ML/HR: 0.9 INJECTION, SOLUTION INTRAVENOUS at 00:04

## 2018-03-23 RX ADMIN — PROPOFOL 50 MG: 10 INJECTION, EMULSION INTRAVENOUS at 15:09

## 2018-03-23 RX ADMIN — MORPHINE SULFATE 4 MG: 4 INJECTION, SOLUTION INTRAMUSCULAR; INTRAVENOUS at 09:12

## 2018-03-23 RX ADMIN — MORPHINE SULFATE 4 MG: 4 INJECTION, SOLUTION INTRAMUSCULAR; INTRAVENOUS at 18:34

## 2018-03-23 RX ADMIN — ONDANSETRON 4 MG: 2 INJECTION INTRAMUSCULAR; INTRAVENOUS at 03:40

## 2018-03-23 RX ADMIN — PROPOFOL 50 MCG/KG/MIN: 10 INJECTION, EMULSION INTRAVENOUS at 15:11

## 2018-03-23 RX ADMIN — EPHEDRINE SULFATE 5 MG: 50 INJECTION, SOLUTION INTRAMUSCULAR; INTRAVENOUS; SUBCUTANEOUS at 15:33

## 2018-03-23 RX ADMIN — PROPOFOL 50 MG: 10 INJECTION, EMULSION INTRAVENOUS at 15:05

## 2018-03-23 RX ADMIN — EPHEDRINE SULFATE 10 MG: 50 INJECTION, SOLUTION INTRAMUSCULAR; INTRAVENOUS; SUBCUTANEOUS at 15:41

## 2018-03-23 RX ADMIN — MORPHINE SULFATE 4 MG: 4 INJECTION, SOLUTION INTRAMUSCULAR; INTRAVENOUS at 03:40

## 2018-03-23 RX ADMIN — MORPHINE SULFATE 4 MG: 4 INJECTION, SOLUTION INTRAMUSCULAR; INTRAVENOUS at 22:39

## 2018-03-23 RX ADMIN — DOCUSATE SODIUM 100 MG: 100 CAPSULE, LIQUID FILLED ORAL at 17:39

## 2018-03-23 RX ADMIN — SODIUM CHLORIDE: 0.9 INJECTION, SOLUTION INTRAVENOUS at 14:49

## 2018-03-23 RX ADMIN — BUPIVACAINE HYDROCHLORIDE 1.8 ML: 5 INJECTION, SOLUTION PERINEURAL at 15:07

## 2018-03-23 RX ADMIN — OXYCODONE HYDROCHLORIDE 10 MG: 10 TABLET ORAL at 19:47

## 2018-03-23 NOTE — ANESTHESIA POSTPROCEDURE EVALUATION
Post-Op Assessment Note      CV Status:  Stable    Mental Status:  Alert and awake    Hydration Status:  Euvolemic    PONV Controlled:  Controlled    Airway Patency:  Patent    Post Op Vitals Reviewed: Yes          Staff: CRNA           BP (P) 115/55 (03/23/18 1618)    Temp (P) 98 9 °F (37 2 °C) (03/23/18 1618)    Pulse (P) 81 (03/23/18 1618)   Resp (P) 18 (03/23/18 1618)    SpO2 (P) 100 % (03/23/18 1618)

## 2018-03-23 NOTE — ANESTHESIA PROCEDURE NOTES
Spinal Block    Patient location during procedure: OR  Start time: 3/23/2018 3:02 PM  End time: 3/23/2018 3:07 PM  Reason for block: at surgeon's request and primary anesthetic  Staffing  Anesthesiologist: Daria Course  Performed: anesthesiologist   Preanesthetic Checklist  Completed: patient identified, site marked, surgical consent, pre-op evaluation, timeout performed, IV checked, risks and benefits discussed and monitors and equipment checked  Spinal Block  Patient position: left lateral decubitus  Prep: Betadine  Patient monitoring: continuous pulse ox, frequent blood pressure checks, cardiac monitor and heart rate  Approach: midline  Location: L3-4  Needle  Needle type: pencil-tip   Needle gauge: 24 G  Needle length: 10 cm  Assessment  Sensory level: [id number] Assessment:  negative aspiration for heme, no paresthesia on injection and positive aspiration for clear CSF    Post-procedure:  site cleaned

## 2018-03-23 NOTE — OP NOTE
OPERATIVE REPORT  PATIENT NAME: Luann Radford    :  1950  MRN: [id number]  Pt Location: AN OR ROOM 01    SURGERY DATE: 3/23/2018    Surgeon(s) and Role:     * Salvador Ramirez MD - Primary    Pre-Op Diagnosis Codes:     * Closed intertrochanteric fracture of left hip, initial encounter ([facility] ) [S72 142A]    Post-Op Diagnosis Codes:     * Closed intertrochanteric fracture of left hip, initial encounter ([facility] ) [S7 142A]    Procedure(s) (LRB):  LEFT HIP LONG TROCHANTERIC FEMORAL NAIL    Specimen(s):  * No specimens in log *    Estimated Blood Loss:   250 mL    Drains:  Urethral Catheter Straight-tip (Active)   Amt returned on insertion(mL) 400 mL 3/22/2018  4:37 PM   Site Assessment Clean 3/22/2018  4:37 PM   Collection Container Standard drainage bag 3/22/2018  4:37 PM   Securement Method Securing device (Describe) 3/22/2018  4:37 PM   Output (mL) 500 mL 3/23/2018  3:37 AM   Number of days: 1       Anesthesia Type:   Spinal    Complications:   None    Procedure and Technique:  The patient was identified in the pre-operative holding area, and the surgical site was marked by the surgeon  The patient was transported to the operating room  Spinal anesthesia was administered on the stretcher in the lateral decubitus position  The patient was then placed in the supine position on the fracture table  A well-padded perineal post was placed  The feet were padded and secured to the fracture table  Traction was applied to the operative extremity to reduce the fracture  The left hip was prepped and draped in the usual sterile fashion  Prophylactic antibiotics were given within 1 hour of incision  Following a surgical timeout, a 5 cm lateral incision was made proximal to the greater trochanter  Deep dissection was carried out sharply through the fascia keiry  The guide wire for the Synthes Trochanteric Fixation Nail (TFN) System was advanced into the femur from a trochanteric starting point    The 17 0 mm cannulated drill bit was passed over the guide wire with the soft tissue protector in place  The ball-tipped guide wire was advanced into the distal femur  Fluoroscopic imaging confirmed satisfactory guide pin position  The femur was reamed incrementally to a 12 5 mm reamer  The long TFN 11 0 x 340 mm was inserted manually over the ball-tipped guide wire  The guide wire for the helical blade was then advanced into the femoral head through a small lateral incision using the external guide  Fluoroscopic imaging confirmed satisfactory pin placement within the femoral head  A depth measurement was obtained, and the outer cortex was drilled  The stepped cannulated drill bit was advanced into the femoral head for the measured blade length  The helical blade 53 7 x 85 mm was inserted, and the preassembled locking mechanism was tightened within the proximal nail  The helical blade  and guide wire were then removed  Perfect circles were obtained distally in the sagittal plane using fluoroscopic imaging  The drill bit for the distal locking screw was advanced across the femur through a small lateral skin incision  A length measurement was obtained, and a 6 5 mm x 38 mm locking screw was placed  Fluoroscopic images were obtained to confirm satisfactory fracture reduction and hardware position  The wounds were then irrigated with sterile saline solution  The fascia keiry was closed with 0-Vicryl suture in an interrupted figure-of-eight fashion  The subcuticular layer was closed with a simple buried 2-0 Vicryl suture, and the skin was closed with staples  The wounds were dressed with sterile Mepilex bandages  Anesthesia was reversed, and the patient was extubated      Patient Disposition:  PACU  and extubated and stable    SIGNATURE: Francisco Javier Jim MD  DATE: March 23, 2018  TIME: 4:14 PM

## 2018-03-23 NOTE — CASE MANAGEMENT
Initial Clinical Review    Admission: Date/Time/Statement: 3/22/18 @ 1230     Orders Placed This Encounter   Procedures    Inpatient Admission (expected length of stay for this patient is greater than two midnights)     Standing Status:   Standing     Number of Occurrences:   1     Order Specific Question:   Admitting Physician     Answer:   Worley Closs [[id number]]     Order Specific Question:   Level of Care     Answer:   Med Surg [[id number]]     Order Specific Question:   Estimated length of stay     Answer:   More than 2 Midnights     Order Specific Question:   Certification     Answer:   I certify that inpatient services are medically necessary for this patient for a duration of greater than two midnights  See H&P and MD Progress Notes for additional information about the patient's course of treatment  ED: Date/Time/Mode of Arrival:   ED Arrival Information     Expected Arrival Acuity Means of Arrival Escorted By Service Admission Type    - 3/22/2018 10:38 Urgent Ambulance [facility] Orthopedics Urgent    Arrival Complaint    -          Chief Complaint:   Chief Complaint   Patient presents with    Fall     Pt presents via EMS after slipping on ice while getting out of car, falling and injuring left hip  No LOC, No thinners, denies any other injuries sustained  History of Illness:    69 y  o female presented to the emergency department with left hip pain after mechanical fall  Patient states she slipped on ice and fell while getting out of her car this morning  She states she fell directly onto her left hip  She denies hitting her head or loss of consciousness  After the fall she noted severe tentative 10 left hip pain and inability to bear weight on the left lower extremity  She presented to the emergency department where x-rays revealed a left hip fracture      ED Vital Signs:   ED Triage Vitals [03/22/18 1049]   Temperature Pulse Respirations Blood Pressure SpO2   97 6 °F (36 4 °C) 67 20 138/78 100 %      Temp Source Heart Rate Source Patient Position - Orthostatic VS BP Location FiO2 (%)   Oral Monitor Lying Right arm --      Pain Score       3        Wt Readings from Last 1 Encounters:   03/22/18 81 4 kg (179 lb 7 3 oz)     Abnormal Labs/Diagnostic Test Results:  K  3 2  hgb  13 3  >  11 3  hct  39  >  34 5    ED Treatment:   Medication Administration from 03/22/2018 1038 to 03/22/2018 4019       Date/Time Order Dose Route Action Action by Comments     03/22/2018 1105 ondansetron (ZOFRAN) injection 4 mg 4 mg Intravenous Given Linnea Fisher RN      03/22/2018 1105 morphine (PF) 4 mg/mL injection 4 mg 4 mg Intravenous Given Linnea Fisher RN      03/22/2018 1157 morphine (PF) 4 mg/mL injection 4 mg 4 mg Intravenous Given Linnea Fisher RN      03/22/2018 1635 amLODIPine (NORVASC) tablet 10 mg 10 mg Oral Not Given Vanesa Plater, RN      03/22/2018 1635 senna (SENOKOT) tablet 8 6 mg 8 6 mg Oral Not Given Vanesa Plater, RN      03/22/2018 1605 nicotine (NICODERM CQ) 14 mg/24hr TD 24 hr patch 1 patch 1 patch Transdermal Not Given Vanesa Plater, LUZMARIA           Past Medical/Surgical History: Active Ambulatory Problems     Diagnosis Date Noted    No Active Ambulatory Problems     Resolved Ambulatory Problems     Diagnosis Date Noted    No Resolved Ambulatory Problems     Past Medical History:   Diagnosis Date    Hypertension     Shin splints     Symptomatic menopausal or female climacteric states        Admitting Diagnosis: Hip pain [M25 559]  Closed fracture of left hip, initial encounter ([facility] 75 ) [S72 002A]  Closed intertrochanteric fracture of left hip, initial encounter ([facility] 75 ) [S72 142A]    Assessment:  69 y  o female with left hip intertrochanteric fracture status post mechanical fall   Plan:   · Admit to Orthopedic Service  · Pain Control  · NWB LLE/Bedrest   · Medical Clearance  · Type and screen ordered  · Plan for OR tomorrow consisting of intramedullary nail fixation    · NPO after midnight    Admission Orders:  Admit  Bedrest  NPO  - sips w/meds   IVF @ 75  Medical clearance by INTERNAL MEDICINE  Incentive spirometry  Sequential compression device to b/l LE     Scheduled Meds:   Current Facility-Administered Medications:  acetaminophen 650 mg Oral Q4H PRN Obed Peterson, PA-JESUSITA    amLODIPine 10 mg Oral Daily Obed Peterson, PA-C    calcium carbonate 1,000 mg Oral Daily PRN Obed Duenasstray, PA-C    docusate sodium 100 mg Oral BID Obed Peterson, PA-C    losartan potassium-hydrochlorothiazide (HYZAAR 50/12  5) combination  Oral Daily Obed Duenasstray, PA-C    morphine injection 4 mg Intravenous Q4H PRN Obed Peterson, PA-C    nicotine 1 patch Transdermal Daily Obed Peterson, PA-C    ondansetron 4 mg Intravenous Q6H PRN Obed Pilsner, PA-C    oxyCODONE 10 mg Oral Q4H PRN Obed Duenassner, PA-C    oxyCODONE 5 mg Oral Q4H PRN Obed Duenasstray, PA-C    pantoprazole 40 mg Oral Early Morning Obed Duenasstray, PA-C    senna 1 tablet Oral Daily Obed Peterson, PA-C    sodium chloride 75 mL/hr Intravenous Continuous Obed Peterson PA-C Last Rate: 75 mL/hr (03/23/18 0004)     Continuous Infusions:   sodium chloride 75 mL/hr Last Rate: 75 mL/hr (03/23/18 0004)       3/22  INTERNAL MEDICINE consult  * Closed intertrochanteric fracture of left hip, initial encounter [facility])   Assessment & Plan     X-ray with Acute intertrochanteric left femoral fracture  Reactive elevated wbc   -Pain medication  -DVT phrophilaxis  -NPO after Midnight  -PT OT evaluation  -discontinue Blackburn catheter after the procedure     According with ACC/AHA Guidelines patient is a  Risk for an intermediate risk no cardiac surgical procedure  Cardiac index score 0 risk index class I  Risk of major cardiac complication 5 1%   Metabolic equivalent 5, for intermediate risk surgical procedure            Preoperative clearance   Assessment & Plan     See above plan          Hypokalemia   Assessment & Plan     Replace K  BMP am          Essential hypertension   Assessment & Plan     Stable  Cont CCB  Cont Hyzaar  Maintain BP < 180/90 mm hg              VTE Prophylaxis: Heparin  / sequential compression device     3/23  ORTHO  Plan:  Pain Control  NWB LLE/Bedrest  NPO  Medically cleared  Type and screen completed    For OR today for left hip TFN     98   73   20   116/57    91% RA sat     Since presentation pt has received:              MS IV  x3 and   IV zofran x2   And   roxicodone po x1

## 2018-03-23 NOTE — ANESTHESIA PREPROCEDURE EVALUATION
Review of Systems/Medical History          Cardiovascular  Hypertension ,    Pulmonary       GI/Hepatic            Endo/Other     GYN       Hematology   Musculoskeletal       Neurology   Psychology           Physical Exam    Airway    Mallampati score: II         Dental   No notable dental hx     Cardiovascular      Pulmonary      Other Findings        Anesthesia Plan  ASA Score- 2     Anesthesia Type- spinal with ASA Monitors  Additional Monitors:   Airway Plan:     Comment: I, Dr Jerrell Villalta, the attending physician, have personally seen and evaluated the patient prior to anesthetic care  I have reviewed the pre-anesthetic record, and other medical records if appropriate to the anesthetic care  If a CRNA is involved in the case, I have reviewed the CRNA assessment, if present, and agree  The patient is in a suitable condition to proceed with my formulated anesthetic plan        Plan Factors-    Induction-     Postoperative Plan-     Informed Consent- Anesthetic plan and risks discussed with patient  I personally reviewed this patient with the CRNA  Discussed and agreed on the Anesthesia Plan with the CRNA  [place]

## 2018-03-23 NOTE — PROGRESS NOTES
Orthopedics   Carlin Zavala 69 y o  female MRN: [id number]  Unit/Bed#: -01      Subjective:  Pt seen this am  States left hip pain is currently controlled  No numbness/tingling  Labs:    0  Lab Value Date/Time   HCT 34 5 (L) 03/23/2018 0434   HCT 39 0 03/22/2018 1104   HCT 44 3 08/28/2017 0821   HCT 42 3 05/16/2015 1158   HGB 11 3 (L) 03/23/2018 0434   HGB 13 3 03/22/2018 1104   HGB 14 8 08/28/2017 0821   HGB 14 1 05/16/2015 1158   INR 0 98 03/23/2018 0434   WBC 7 00 03/23/2018 0434   WBC 10 90 (H) 03/22/2018 1104   WBC 5 26 08/28/2017 0821   WBC 5 19 05/16/2015 1158       Meds:    Current Facility-Administered Medications:     acetaminophen (TYLENOL) tablet 650 mg, 650 mg, Oral, Q4H PRN, Nilesh Pan PA-C    amLODIPine (NORVASC) tablet 10 mg, 10 mg, Oral, Daily, Nilesh Pan PA-C    calcium carbonate (TUMS) chewable tablet 1,000 mg, 1,000 mg, Oral, Daily PRN, Nilesh Pan PA-C    docusate sodium (COLACE) capsule 100 mg, 100 mg, Oral, BID, Nilesh Pan PA-C    losartan potassium-hydrochlorothiazide (HYZAAR 50/12  5) combination, , Oral, Daily, Nilesh Pan PA-C    morphine (PF) 4 mg/mL injection 4 mg, 4 mg, Intravenous, Q4H PRN, Nilesh Pan PA-C, 4 mg at 03/23/18 0340    nicotine (NICODERM CQ) 14 mg/24hr TD 24 hr patch 1 patch, 1 patch, Transdermal, Daily, Nilesh Pan PA-C    ondansetron [facility]) injection 4 mg, 4 mg, Intravenous, Q6H PRN, Nilesh Pan PA-C, 4 mg at 03/23/18 0340    oxyCODONE (ROXICODONE) immediate release tablet 10 mg, 10 mg, Oral, Q4H PRN, Nilesh Pan PA-C, 10 mg at 03/22/18 1917    oxyCODONE (ROXICODONE) IR tablet 5 mg, 5 mg, Oral, Q4H PRN, Nilesh Pan PA-C    pantoprazole (PROTONIX) EC tablet 40 mg, 40 mg, Oral, Early Morning, Nilesh Pan PA-C    senna (SENOKOT) tablet 8 6 mg, 1 tablet, Oral, Daily, Nilesh Pan PA-C    sodium chloride 0 9 % infusion, 75 mL/hr, Intravenous, Continuous, Nilesh Pan PA-C, Last Rate: 75 mL/hr at 03/23/18 0004, 75 mL/hr at 03/23/18 0004      Physical exam:  Vitals:    03/22/18 2238   BP: 121/60   Pulse: 73   Resp: 18   Temp: 98 5 °F (36 9 °C)   SpO2: 95%     Left Hip:  · Skin intact  · Motor/Sensation intact LLE  · 2+ DP pulse    _*_*_*_*_*_*_*_*_*_*_*_*_*_*_*_*_*_*_*_*_*_*_*_*_*_*_*_*_*_*_*_*_*_*_*_*_*_*_*_*_*    Assessment: 69 y  o female with left hip IT fracture s/p mechanical fall    Plan:  Pain Control  NWB LLE/Bedrest  NPO  Medically cleared  Type and screen completed    For OR today for left hip TFN    Nancy Yu PA-C

## 2018-03-23 NOTE — PROGRESS NOTES
Chart reviewed, ortho primary  SLIM consulted for medical clearance, patient cleared for OR yesterday by attending  Patient not seen and examined today as she was taken to the OR earlier this afternoon and remains in the OR at this time for left hip TFN  Lab work from today reviewed, Hg dropped to 11 3 today from 13 3 yesterday  Possibly dilutional as patient has been receiving IV fluids  Leukocytosis of 10 90 on admission, which was likely reactive, has improved/ resolved, WBC 7 this AM  Hypokalemia has resolved and Cr stable at 0 77 today  Obtain repeat CBC and BMP in AM tomorrow  Discussed with nursing  Patient will be seen on 3/24/18  Please contact SLIM with questions/ concerns if needed post-op

## 2018-03-24 PROBLEM — E87.6 HYPOKALEMIA: Status: RESOLVED | Noted: 2018-03-22 | Resolved: 2018-03-24

## 2018-03-24 PROBLEM — D69.6 THROMBOCYTOPENIA (HCC): Status: ACTIVE | Noted: 2018-03-24

## 2018-03-24 PROBLEM — D64.9 ANEMIA: Status: ACTIVE | Noted: 2018-03-24

## 2018-03-24 LAB
ERYTHROCYTE [DISTWIDTH] IN BLOOD BY AUTOMATED COUNT: 11.7 % (ref 11.6–15.1)
HCT VFR BLD AUTO: 27.9 % (ref 34.8–46.1)
HCT VFR BLD AUTO: 29.1 % (ref 34.8–46.1)
HGB BLD-MCNC: 9 G/DL (ref 11.5–15.4)
HGB BLD-MCNC: 9.5 G/DL (ref 11.5–15.4)
MCH RBC QN AUTO: 31.5 PG (ref 26.8–34.3)
MCHC RBC AUTO-ENTMCNC: 32.3 G/DL (ref 31.4–37.4)
MCV RBC AUTO: 98 FL (ref 82–98)
PLATELET # BLD AUTO: 127 THOUSANDS/UL (ref 149–390)
PMV BLD AUTO: 10.9 FL (ref 8.9–12.7)
RBC # BLD AUTO: 2.86 MILLION/UL (ref 3.81–5.12)
WBC # BLD AUTO: 6.77 THOUSAND/UL (ref 4.31–10.16)

## 2018-03-24 PROCEDURE — G8982 BODY POS GOAL STATUS: HCPCS | Performed by: PHYSICAL THERAPIST

## 2018-03-24 PROCEDURE — 85018 HEMOGLOBIN: CPT | Performed by: PHYSICIAN ASSISTANT

## 2018-03-24 PROCEDURE — G8981 BODY POS CURRENT STATUS: HCPCS | Performed by: PHYSICAL THERAPIST

## 2018-03-24 PROCEDURE — 99232 SBSQ HOSP IP/OBS MODERATE 35: CPT | Performed by: PHYSICIAN ASSISTANT

## 2018-03-24 PROCEDURE — 99024 POSTOP FOLLOW-UP VISIT: CPT | Performed by: PHYSICIAN ASSISTANT

## 2018-03-24 PROCEDURE — 85014 HEMATOCRIT: CPT | Performed by: PHYSICIAN ASSISTANT

## 2018-03-24 PROCEDURE — 97163 PT EVAL HIGH COMPLEX 45 MIN: CPT | Performed by: PHYSICAL THERAPIST

## 2018-03-24 PROCEDURE — 85027 COMPLETE CBC AUTOMATED: CPT | Performed by: ORTHOPAEDIC SURGERY

## 2018-03-24 RX ADMIN — CEFAZOLIN SODIUM 1000 MG: 1 SOLUTION INTRAVENOUS at 16:53

## 2018-03-24 RX ADMIN — DOCUSATE SODIUM 100 MG: 100 CAPSULE, LIQUID FILLED ORAL at 08:25

## 2018-03-24 RX ADMIN — OXYCODONE HYDROCHLORIDE 10 MG: 10 TABLET ORAL at 14:22

## 2018-03-24 RX ADMIN — ENOXAPARIN SODIUM 40 MG: 40 INJECTION SUBCUTANEOUS at 09:15

## 2018-03-24 RX ADMIN — OXYCODONE HYDROCHLORIDE 10 MG: 10 TABLET ORAL at 04:59

## 2018-03-24 RX ADMIN — HYDROCHLOROTHIAZIDE: 12.5 TABLET ORAL at 08:24

## 2018-03-24 RX ADMIN — CEFAZOLIN SODIUM 1000 MG: 1 SOLUTION INTRAVENOUS at 09:13

## 2018-03-24 RX ADMIN — OXYCODONE HYDROCHLORIDE 10 MG: 10 TABLET ORAL at 10:07

## 2018-03-24 RX ADMIN — AMLODIPINE BESYLATE 10 MG: 10 TABLET ORAL at 08:25

## 2018-03-24 RX ADMIN — SODIUM CHLORIDE 50 ML/HR: 0.9 INJECTION, SOLUTION INTRAVENOUS at 05:00

## 2018-03-24 RX ADMIN — PANTOPRAZOLE SODIUM 40 MG: 40 TABLET, DELAYED RELEASE ORAL at 05:00

## 2018-03-24 NOTE — ASSESSMENT & PLAN NOTE
· ABLA  · Hg 9 0 this AM from 11 3 yesterday and 13 3 on admission  · Obtain repeat H&H this evening and repeat CBC in AM to monitor Hg

## 2018-03-24 NOTE — ASSESSMENT & PLAN NOTE
· Orthopedic surgery primary  · POD #1 s/p left hip long trochanteric femoral nail  · WBAT per ortho  · PT/OT eval pending  · Continue current pain control regimen  Secondary Intention Text (Leave Blank If You Do Not Want): The defect will heal with secondary intention.

## 2018-03-24 NOTE — ASSESSMENT & PLAN NOTE
· Platelet count 546 today from 170 yesterday  · Continue to monitor with use of Lovenox    · Obtain repeat CBC in AM

## 2018-03-24 NOTE — PHYSICAL THERAPY NOTE
PHYSICAL THERAPY EVALUATION NOTE   03/24/18 1500   Note Type   Note type Eval only   Pain Assessment   Pain Assessment 0-10   Pain Score 4   Pain Type Surgical pain   Pain Location Hip   Pain Orientation Left   Pain Frequency Other (Comment)  (with movement)   Effect of Pain on Daily Activities limited mobility   Patient's Stated Pain Goal No pain   Hospital Pain Intervention(s) Medication (See MAR)   Home Living   Type of [address] One level; Able to live on main level with bedroom/bathroom; Other (Comment)  (1 NAYE)   Home Equipment (none)   Prior Function   Level of [place] Independent with ADLs and functional mobility   Lives With Spouse   Receives Help From Family   ADL Assistance Independent   IADLs Independent   Falls in the last 6 months 0   Vocational Retired   Restrictions/Precautions   Wells Maria Guadalupe Bearing Precautions Per Order Yes   LLE Wells Kermit Bearing Per Order WBAT   Other Precautions Pain;Multiple lines; Fall Risk  (IV, post operative ORIF L Hip-S/P Intertrochanteric fx)   General   Additional Pertinent History per H&P: 69 y  o female presented to the emergency department with left hip pain after mechanical fall  Patient states she slipped on ice and fell while getting out of her car this morning  She states she fell directly onto her left hip  She denies hitting her head or loss of consciousness  After the fall she noted severe tentative 10 left hip pain and inability to bear weight on the left lower extremity  She presented to the emergency department where x-rays revealed a left hip fracture  Currently her pain is well controlled  Pain is worse with movement  No numbness or tingling  No fevers or chills  She has not utilize assistive device to ambulate     Family/Caregiver Present Yes   Cognition   Overall Cognitive Status WFL   Arousal/Participation Alert   Orientation Level Oriented X4   Memory Within functional limits   Following Commands Follows one step commands with increased time or repetition   RUE Assessment   RUE Assessment WFL   LUE Assessment   LUE Assessment WFL   RLE Assessment   RLE Assessment WFL   LLE Assessment   LLE Assessment X  (hip and knee ROM limited 2/2 pain- able to DF/PF actively)   Light Touch   RLE Light Touch Grossly intact   LLE Light Touch Grossly intact   Bed Mobility   Supine to Sit 3  Moderate assistance   Additional items Increased time required;Verbal cues;LE management;Assist x 2  (>5 min to perform supine to sit)   Sit to Supine 1  Dependent   Additional items Assist x 2   Additional Comments Patient dependent back to bed 2/2 feeling lightheaded, /60 with HR of 100 BPM   Transfers   Sit to Stand 3  Moderate assistance   Additional items Assist x 1;Other; Increased time required  (raised bed)   Stand to Sit 4  Minimal assistance   Additional items Assist x 1   Stand pivot 3  Moderate assistance   Additional items Assist x 1; Increased time required; Other  (poor WB through LLE)   Ambulation/Elevation   Gait Assistance Not tested   Stair Management Assistance Not tested   Balance   Static Sitting Fair   Dynamic Sitting Fair -   Static Standing Poor   Dynamic Standing Poor   Endurance Deficit   Endurance Deficit Yes   Activity Tolerance   Activity Tolerance Patient limited by pain; Patient limited by fatigue   Nurse Made Aware yes  (compression device placed on B/L LE - notified nursing)   Assessment   Prognosis Good   Problem List Decreased strength;Decreased range of motion;Decreased endurance; Impaired balance;Decreased mobility;Orthopedic restrictions;Pain   Assessment Anne-Marie Daly is a 69 y o  female who was admitted to [address] [place] [address] on 3/22/18, 2/2 mechanical fall on ice when getting out of her car with resulting left hip intertrochanteric fx  Patient is POD1 left hip ORIF   PMH includes but is not limited to HTN with a surgical hx of appendectomy and hysterectomy  Prior to admission, patient lived in a 1 story home with her , independent with ADLs and IADLs, no AD needed and denies falls   Upon evaluation, she demonstrate impairments in LE strength, ROM, balance, endurance along with associated left hip pain  She performed well upon evaluation, increased time was required with bed mobility along with LE management  She stood EOB x 1 minute before becoming lightheaded  She was sat down, HR of 100 bpm  Patients lightheadedness persisted, she was placed supine with HOB slightly elevated, BP: 140/60 as she become asymptomatic  She can continue to benefit from IP PT at this time in order to address the impairments discussed in order to improve overall mobility, decrease pain and improve functionality  Treatment to focus on bed mobility/transfers, standing tolerance, amb and if appropriate steps  Recommend rehab at this time  Clinical complexity is unpredictable 2/2 PMH, current fall and fx of left hip with resultant decrease in overall function  Barriers to Discharge Decreased caregiver support; Inaccessible home environment   Barriers to Discharge Comments 1 NAYE home   Goals   Patient Goals less pain   STG Expiration Date 03/31/18   Short Term Goal #1 Patient will perform sitting and standing EOB without feelings of lightheadedness/nausea  Patient will perform supine to sit with HOB elevated with Mod A  Patient will perform sit to stand with Min A  Patient will perform pivot turn to toilet with RW and Min A  Patient will amb 25 feet with RW and Min A  Patient will asc 1 step with Min A, HRx1   Plan   Treatment/Interventions Functional transfer training;LE strengthening/ROM; Elevations; Therapeutic exercise; Endurance training;Bed mobility;Gait training;Spoke to nursing;OT   PT Frequency 7x/wk   Recommendation   Recommendation Post acute IP rehab   Equipment Recommended Walker   PT - OK to Discharge Yes   Additional Comments to Rehab   Barthel Index   Feeding 10 Bathing 0   Grooming Score 0   Dressing Score 5   Bladder Score 10   Bowels Score 10   Toilet Use Score 0   Transfers (Bed/Chair) Score 5   Mobility (Level Surface) Score 5   Stairs Score 0   Barthel Index Score 45     Robert Hsieh PT      Patient Name: Jose Guadalupe Ford  ZDHPS'L Date: 3/24/2018

## 2018-03-24 NOTE — PROGRESS NOTES
Orthopedics   Estephanie Ramos 69 y o  female MRN: [id number]  Unit/Bed#: -01      Subjective:  69 y  o female post operative day 1 left femoral intramedullary nail  Pt doing well  Pain controlled  Labs:    0  Lab Value Date/Time   HCT 27 9 (L) 03/24/2018 0851   HCT 34 5 (L) 03/23/2018 0434   HCT 39 0 03/22/2018 1104   HCT 42 3 05/16/2015 1158   HGB 9 0 (L) 03/24/2018 0851   HGB 11 3 (L) 03/23/2018 0434   HGB 13 3 03/22/2018 1104   HGB 14 1 05/16/2015 1158   INR 0 98 03/23/2018 0434   WBC 6 77 03/24/2018 0851   WBC 7 00 03/23/2018 0434   WBC 10 90 (H) 03/22/2018 1104   WBC 5 19 05/16/2015 1158       Meds:    Current Facility-Administered Medications:     acetaminophen (TYLENOL) tablet 650 mg, 650 mg, Oral, Q4H PRN, [facility], PA-C    amLODIPine (NORVASC) tablet 10 mg, 10 mg, Oral, Daily, [facility], PA-C, 10 mg at 03/24/18 0825    calcium carbonate (TUMS) chewable tablet 1,000 mg, 1,000 mg, Oral, Daily PRN, [facility], PA-C    ceFAZolin (ANCEF) IVPB (premix) 1,000 mg, 1,000 mg, Intravenous, Q8H, Zheng Canales MD    docusate sodium (COLACE) capsule 100 mg, 100 mg, Oral, BID, [facility], PA-C, 100 mg at 03/24/18 0825    enoxaparin (LOVENOX) subcutaneous injection 40 mg, 40 mg, Subcutaneous, Q24H [address], Zheng Canales MD    losartan potassium-hydrochlorothiazide (HYZAAR 50/12  5) combination, , Oral, Daily, [facility] SERVICES, PA-C    morphine (PF) 4 mg/mL injection 4 mg, 4 mg, Intravenous, Q4H PRN, [facility] SERVICES, PA-C, 4 mg at 03/23/18 2239    nicotine (NICODERM CQ) 14 mg/24hr TD 24 hr patch 1 patch, 1 patch, Transdermal, Daily, [facility] SERVICES, PA-C    ondansetron [facility] injection 4 mg, 4 mg, Intravenous, Q6H PRN, [facility], PA-C, 4 mg at 03/23/18 0340    oxyCODONE (ROXICODONE) immediate release tablet 10 mg, 10 mg, Oral, Q4H PRN, [facility] SERVICES, PA-C, 10 mg at 03/24/18 0459    oxyCODONE (ROXICODONE) IR tablet 5 mg, 5 mg, Oral, Q4H PRN, [facility], PA-C    pantoprazole (PROTONIX) EC tablet 40 mg, 40 mg, Oral, Early Morning, Gavin Dutta PA-C, 40 mg at 03/24/18 0500    senna (SENOKOT) tablet 8 6 mg, 1 tablet, Oral, Daily, Gavin Dutta PA-C, Stopped at 03/23/18 5846    sodium chloride 0 9 % infusion, 75 mL/hr, Intravenous, Continuous, Gavin Dutta PA-C, Last Rate: 75 mL/hr at 03/23/18 0004    sodium chloride 0 9 % infusion, 50 mL/hr, Intravenous, Continuous, Brianradha DevinVICKY, Last Rate: 50 mL/hr at 03/24/18 0500, 50 mL/hr at 03/24/18 0500    Blood Culture:   No results found for: BLOODCX    Wound Culture:   No results found for: WOUNDCULT    Ins and Outs:  I/O last 24 hours: In: 1260 [P O :260; I V :1000]  Out: 750 [Urine:500; Blood:250]          Physical exam:  Vitals:    03/24/18 0738   BP: 115/64   Pulse: 91   Resp: 18   Temp: 98 2 °F (36 8 °C)   SpO2: 95%     Pain controlled, no acute distress  left lower extremity  · Dressings clean dry intact  · Sensation intact L1-S1  · Motor intact to knee flexion/extension, EHL/FHL  · 2+ dorsalis pedis pulse    _*_*_*_*_*_*_*_*_*_*_*_*_*_*_*_*_*_*_*_*_*_*_*_*_*_*_*_*_*_*_*_*_*_*_*_*_*_*_*_*_*    Assessment: 69 y  o female post operative day 1 left femur IMN   Pt doing well    Plan:  · Up and out of bed  · Weightbearing as tolerated LLE  · PT/OT  · DVT prophylaxis  · Analgesics  · Dispo: Ortho will follow  · Patient noted to have acute blood loss anemia due to a drop in Hbg of > 2 0g from preop levels, will monitor vital signs and resuscitate with IV fluids as needed    Debra Hidalgo PA-C

## 2018-03-24 NOTE — PLAN OF CARE
Problem: PHYSICAL THERAPY ADULT  Goal: Performs mobility at highest level of function for planned discharge setting  See evaluation for individualized goals  Treatment/Interventions: Functional transfer training, LE strengthening/ROM, Elevations, Therapeutic exercise, Endurance training, Bed mobility, Gait training, Spoke to nursing, OT  Equipment Recommended: Gerson Holder       See flowsheet documentation for full assessment, interventions and recommendations  Prognosis: Good  Problem List: Decreased strength, Decreased range of motion, Decreased endurance, Impaired balance, Decreased mobility, Orthopedic restrictions, Pain  Assessment: Stevenson Carvalho is a 69 y o  female who was admitted to [address] [place] [address] on 3/22/18, 2/2 mechanical fall on ice when getting out of her car with resulting left hip intertrochanteric fx  Patient is POD1 left hip ORIF  PMH includes but is not limited to HTN with a surgical hx of appendectomy and hysterectomy  Prior to admission, patient lived in a 1 story home with her , independent with ADLs and IADLs, no AD needed and denies falls   Upon evaluation, she demonstrate impairments in LE strength, ROM, balance, endurance along with associated left hip pain  She performed well upon evaluation, increased time was required with bed mobility along with LE management  She stood EOB x 1 minute before becoming lightheaded  She was sat down, HR of 100 bpm  Patients lightheadedness persisted, she was placed supine with HOB slightly elevated, BP: 140/60 as she become asymptomatic  She can continue to benefit from IP PT at this time in order to address the impairments discussed in order to improve overall mobility, decrease pain and improve functionality  Treatment to focus on bed mobility/transfers, standing tolerance, amb and if appropriate steps  Recommend rehab at this time   Clinical complexity is unpredictable 2/2 PMH, current fall and fx of left hip with resultant decrease in overall function  Barriers to Discharge: Decreased caregiver support, Inaccessible home environment  Barriers to Discharge Comments: 1 NAYE home  Recommendation: Post acute IP rehab     PT - OK to Discharge: Yes    See flowsheet documentation for full assessment

## 2018-03-24 NOTE — PROGRESS NOTES
Progress Note - Kinjal Loera 1950, 69 y o  female MRN: [id number]    Unit/Bed#: -01 Encounter: [id number]    Primary Care Provider: Johnathon Corona MD   Date and time admitted to hospital: 3/22/2018 10:38 AM        * Closed intertrochanteric fracture of left hip, initial encounter [facility])   Assessment & Plan    · Orthopedic surgery primary  · POD #1 s/p left hip long trochanteric femoral nail  · WBAT per ortho  · PT/OT eval pending  · Continue current pain control regimen  Anemia   Assessment & Plan    · ABLA  · Hg 9 0 this AM from 11 3 yesterday and 13 3 on admission  · Obtain repeat H&H this evening and repeat CBC in AM to monitor Hg  Thrombocytopenia (HCC)   Assessment & Plan    · Platelet count 677 today from 170 yesterday  · Continue to monitor with use of Lovenox  · Obtain repeat CBC in AM           Essential hypertension   Assessment & Plan    · BP acceptable  · Continue Hyzaar and amlodipine  Hypokalemia-resolved as of 3/24/2018   Assessment & Plan    Resolved on AM labs yesterday  VTE Pharmacologic Prophylaxis:   Pharmacologic: Enoxaparin (Lovenox)  Mechanical VTE Prophylaxis in Place: Yes    Patient Centered Rounds: I have performed bedside rounds with nursing staff today  Discussions with Specialists or Other Care Team Provider: nursing    Education and Discussions with Family / Patient: patient    Time Spent for Care: 15 minutes  More than 50% of total time spent on counseling and coordination of care as described above  Current Length of Stay: 2 day(s)    Current Patient Status: Inpatient   Certification Statement: The patient will continue to require additional inpatient hospital stay due to per primary service    Discharge Plan: per primary service    Code Status: Level 1 - Full Code      Subjective:   Patient sitting up in bed, reports fatigue  Notes some mild pain in her left hip at this time, rated 3/10   She denies chest pain or SOB  Reports a mild dry cough  No bowel or bladder complaints  Denies lower extremity numbness or weakness  Objective:     Vitals:   Temp (24hrs), Av 1 °F (36 7 °C), Min:97 °F (36 1 °C), Max:99 2 °F (37 3 °C)    HR:  [72-99] 91  Resp:  [15-23] 18  BP: (111-141)/(55-66) 115/64  SpO2:  [90 %-100 %] 95 %  Body mass index is 31 79 kg/m²  Input and Output Summary (last 24 hours): Intake/Output Summary (Last 24 hours) at 18 1015  Last data filed at 18 8646   Gross per 24 hour   Intake             1260 ml   Output              750 ml   Net              510 ml       Physical Exam:     Physical Exam   Constitutional: She is oriented to person, place, and time  No distress  HENT:   Head: Normocephalic and atraumatic  Eyes: Conjunctivae are normal    Neck: Neck supple  Cardiovascular: Normal rate and regular rhythm  Pulmonary/Chest: Effort normal and breath sounds normal  No respiratory distress  Abdominal: Soft  Bowel sounds are normal  She exhibits no distension  There is no tenderness  Musculoskeletal: She exhibits tenderness (mild tenderness of left hip)  She exhibits no edema  Neurological: She is alert and oriented to person, place, and time  Skin: Skin is warm and dry  She is not diaphoretic  Dressing intact on left hip   Psychiatric: She has a normal mood and affect  Her behavior is normal    Nursing note and vitals reviewed  Additional Data:     Labs:      Results from last 7 days  Lab Units 18  0851  18  1104   WBC Thousand/uL 6 77  < > 10 90*   HEMOGLOBIN g/dL 9 0*  < > 13 3   HEMATOCRIT % 27 9*  < > 39 0   PLATELETS Thousands/uL 127*  < > 201   NEUTROS PCT %  --   --  77*   LYMPHS PCT %  --   --  16   MONOS PCT %  --   --  6   EOS PCT %  --   --  1   < > = values in this interval not displayed      Results from last 7 days  Lab Units 18  0434   SODIUM mmol/L 142   POTASSIUM mmol/L 4 1   CHLORIDE mmol/L 107   CO2 mmol/L 28   BUN mg/dL 21 CREATININE mg/dL 0 77   CALCIUM mg/dL 8 0*   GLUCOSE RANDOM mg/dL 106       Results from last 7 days  Lab Units 03/23/18  0434   INR  0 98       * I Have Reviewed All Lab Data Listed Above  * Additional Pertinent Lab Tests Reviewed: All Labs Within Last 24 Hours Reviewed    Imaging:    Imaging Reports Reviewed Today Include: none  Imaging Personally Reviewed by Myself Includes:  none    Recent Cultures (last 7 days):           Last 24 Hours Medication List:     Current Facility-Administered Medications:  acetaminophen 650 mg Oral Q4H PRN Jovan Ortiz PA-C    amLODIPine 10 mg Oral Daily Jovan Ortiz PA-C    calcium carbonate 1,000 mg Oral Daily PRN Jovan Ortiz PA-C    cefazolin 1,000 mg Intravenous Q8H Laurie Hernandez MD Last Rate: 1,000 mg (03/24/18 0913)   docusate sodium 100 mg Oral BID Jovan Ortiz PA-C    enoxaparin 40 mg Subcutaneous Q24H [address] Laurie Hernandez MD    losartan potassium-hydrochlorothiazide (HYZAAR 50/12  5) combination  Oral Daily Jovan Ortiz PA-C    morphine injection 4 mg Intravenous Q4H PRN Jovan Ortiz PA-C    nicotine 1 patch Transdermal Daily Jovan Ortiz PA-C    ondansetron 4 mg Intravenous Q6H PRN Jovan Ortiz PA-C    oxyCODONE 10 mg Oral Q4H PRN Jovan Ortiz PA-C    oxyCODONE 5 mg Oral Q4H PRN Jovan Ortiz PA-C    pantoprazole 40 mg Oral Early Morning Jovan Ortiz PA-C    senna 1 tablet Oral Daily Jovan Ortiz PA-C    sodium chloride 75 mL/hr Intravenous Continuous Jovan Ortiz PA-C Last Rate: 75 mL/hr (03/23/18 0004)   sodium chloride 50 mL/hr Intravenous Continuous Jade Rodriguez CRNA Last Rate: 50 mL/hr (03/24/18 0500)        Today, Patient Was Seen By: Chris Monreal PA-C    ** Please Note: Dictation voice to text software may have been used in the creation of this document   **

## 2018-03-25 ENCOUNTER — APPOINTMENT (INPATIENT)
Dept: RADIOLOGY | Facility: HOSPITAL | Age: 68
DRG: 481 | End: 2018-03-25
Payer: MEDICARE

## 2018-03-25 LAB
BACTERIA UR QL AUTO: ABNORMAL /HPF
BILIRUB UR QL STRIP: NEGATIVE
CLARITY UR: ABNORMAL
COLOR UR: YELLOW
ERYTHROCYTE [DISTWIDTH] IN BLOOD BY AUTOMATED COUNT: 11.4 % (ref 11.6–15.1)
GLUCOSE UR STRIP-MCNC: NEGATIVE MG/DL
HCT VFR BLD AUTO: 25.8 % (ref 34.8–46.1)
HCT VFR BLD AUTO: 27.3 % (ref 34.8–46.1)
HGB BLD-MCNC: 8.3 G/DL (ref 11.5–15.4)
HGB BLD-MCNC: 9 G/DL (ref 11.5–15.4)
HGB UR QL STRIP.AUTO: ABNORMAL
KETONES UR STRIP-MCNC: NEGATIVE MG/DL
LEUKOCYTE ESTERASE UR QL STRIP: NEGATIVE
MCH RBC QN AUTO: 31 PG (ref 26.8–34.3)
MCHC RBC AUTO-ENTMCNC: 32.2 G/DL (ref 31.4–37.4)
MCV RBC AUTO: 96 FL (ref 82–98)
NITRITE UR QL STRIP: NEGATIVE
NON-SQ EPI CELLS URNS QL MICRO: ABNORMAL /HPF
OTHER STN SPEC: ABNORMAL
PH UR STRIP.AUTO: 5.5 [PH] (ref 4.5–8)
PLATELET # BLD AUTO: 121 THOUSANDS/UL (ref 149–390)
PMV BLD AUTO: 11.7 FL (ref 8.9–12.7)
PROT UR STRIP-MCNC: NEGATIVE MG/DL
RBC # BLD AUTO: 2.68 MILLION/UL (ref 3.81–5.12)
RBC #/AREA URNS AUTO: ABNORMAL /HPF
SP GR UR STRIP.AUTO: 1.01 (ref 1–1.03)
UROBILINOGEN UR QL STRIP.AUTO: 0.2 E.U./DL
WBC # BLD AUTO: 7.55 THOUSAND/UL (ref 4.31–10.16)
WBC #/AREA URNS AUTO: ABNORMAL /HPF

## 2018-03-25 PROCEDURE — 85027 COMPLETE CBC AUTOMATED: CPT | Performed by: PHYSICIAN ASSISTANT

## 2018-03-25 PROCEDURE — 99232 SBSQ HOSP IP/OBS MODERATE 35: CPT | Performed by: PHYSICIAN ASSISTANT

## 2018-03-25 PROCEDURE — 99024 POSTOP FOLLOW-UP VISIT: CPT | Performed by: ORTHOPAEDIC SURGERY

## 2018-03-25 PROCEDURE — 85014 HEMATOCRIT: CPT | Performed by: PHYSICIAN ASSISTANT

## 2018-03-25 PROCEDURE — 85018 HEMOGLOBIN: CPT | Performed by: PHYSICIAN ASSISTANT

## 2018-03-25 PROCEDURE — 93005 ELECTROCARDIOGRAM TRACING: CPT

## 2018-03-25 PROCEDURE — 71045 X-RAY EXAM CHEST 1 VIEW: CPT

## 2018-03-25 PROCEDURE — 81001 URINALYSIS AUTO W/SCOPE: CPT | Performed by: PHYSICIAN ASSISTANT

## 2018-03-25 RX ORDER — SODIUM CHLORIDE 9 MG/ML
75 INJECTION, SOLUTION INTRAVENOUS CONTINUOUS
Status: DISCONTINUED | OUTPATIENT
Start: 2018-03-25 | End: 2018-03-26

## 2018-03-25 RX ADMIN — PANTOPRAZOLE SODIUM 40 MG: 40 TABLET, DELAYED RELEASE ORAL at 05:46

## 2018-03-25 RX ADMIN — SODIUM CHLORIDE 75 ML/HR: 0.9 INJECTION, SOLUTION INTRAVENOUS at 14:24

## 2018-03-25 RX ADMIN — SENNOSIDES 8.6 MG: 8.6 TABLET, FILM COATED ORAL at 08:35

## 2018-03-25 RX ADMIN — ACETAMINOPHEN 650 MG: 325 TABLET, FILM COATED ORAL at 08:40

## 2018-03-25 RX ADMIN — MORPHINE SULFATE 4 MG: 4 INJECTION, SOLUTION INTRAMUSCULAR; INTRAVENOUS at 01:35

## 2018-03-25 RX ADMIN — HYDROCHLOROTHIAZIDE: 12.5 TABLET ORAL at 08:34

## 2018-03-25 RX ADMIN — AMLODIPINE BESYLATE 10 MG: 10 TABLET ORAL at 08:35

## 2018-03-25 RX ADMIN — ONDANSETRON 4 MG: 2 INJECTION INTRAMUSCULAR; INTRAVENOUS at 12:12

## 2018-03-25 RX ADMIN — ENOXAPARIN SODIUM 40 MG: 40 INJECTION SUBCUTANEOUS at 08:35

## 2018-03-25 RX ADMIN — MORPHINE SULFATE 4 MG: 4 INJECTION, SOLUTION INTRAMUSCULAR; INTRAVENOUS at 11:18

## 2018-03-25 RX ADMIN — DOCUSATE SODIUM 100 MG: 100 CAPSULE, LIQUID FILLED ORAL at 08:35

## 2018-03-25 RX ADMIN — SODIUM CHLORIDE 500 ML: 0.9 INJECTION, SOLUTION INTRAVENOUS at 12:55

## 2018-03-25 NOTE — ASSESSMENT & PLAN NOTE
· ABLA  · Hg 8 3 this AM from 9 0 yesterday AM and 13 3 on admission  · Patient has been receiving IV fluids post-op; possibly dilutional as well  · Discontinue IV fluids  Encourage PO intake  · Repeat CBC in AM to monitor Hg

## 2018-03-25 NOTE — ASSESSMENT & PLAN NOTE
· Orthopedic surgery primary  · POD #2 s/p left hip long trochanteric femoral nail  · WBAT per ortho  · PT recommending rehab  · Continue current pain control regimen

## 2018-03-25 NOTE — PLAN OF CARE
Problem: DISCHARGE PLANNING - CARE MANAGEMENT  Goal: Discharge to post-acute care or home with appropriate resources  INTERVENTIONS:  - Conduct assessment to determine patient/family and health care team treatment goals, and need for post-acute services based on payer coverage, community resources, and patient preferences, and barriers to discharge  - Address psychosocial, clinical, and financial barriers to discharge as identified in assessment in conjunction with the patient/family and health care team  - Arrange appropriate level of post-acute services according to patients   needs and preference and payer coverage in collaboration with the physician and health care team  - Communicate with and update the patient/family, physician, and health care team regarding progress on the discharge plan  - Arrange appropriate transportation to post-acute venues  Outcome: Progressing  CM met with patient at bedside  Also present was family  CM reviewed PT recommendation of STR  Patient is agreeable  CM provided her with SNF list   Patient and family will review this and have choices in the AM for weekday CM  CM dept will continue to follow

## 2018-03-25 NOTE — ASSESSMENT & PLAN NOTE
· Platelet count stable at 121 today  · Continue to monitor with use of Lovenox    · Obtain repeat CBC in AM

## 2018-03-25 NOTE — PROGRESS NOTES
Progress Note - Pleas July 1950, 69 y o  female MRN: [id number]    Unit/Bed#: -01 Encounter: [id number]    Primary Care Provider: Felicia Miller MD   Date and time admitted to hospital: 3/22/2018 10:38 AM      · Addendum at 5: called into patient's room by nursing  Nursing was ambulating patient from the bed to the commode when the patient became diaphoretic and dizzy/ lightheaded  BP notes to be 88/50  Patient was placed on 2L of O2, pulse ox stable at 93%  Patient currently sitting in bed, symptoms are improving with rest  Patient appears pale  · Near syncope due to hypotension  · Give 500 mL bolus of IV fluids now and monitor BP closely  · EKG obtained, normal sinus rhythm  · Monitor on telemetry  · Obtain orthostatics once patient is feeling better  · Obtain repeat H&H now  · SIRS: as evidenced by fever and tachycardia as well as hypotension  · Patient noted to be febrile overnight with temp of 100 6  · Patient also tachycardic with HR in the 90s  · Suspect may be due to post-op atelectasis  · Obtain chest x-ray  · Incentive spirometry  · Patient with urinary retention earlier today  · Obtain UA as well  · Consideration for obtaining blood cultures if develops persistent fever  * Closed intertrochanteric fracture of left hip, initial encounter [facility])   Assessment & Plan    · Orthopedic surgery primary  · POD #2 s/p left hip long trochanteric femoral nail  · WBAT per ortho  · PT recommending rehab  · Continue current pain control regimen  Anemia   Assessment & Plan    · ABLA  · Hg 8 3 this AM from 9 0 yesterday AM and 13 3 on admission  · Patient has been receiving IV fluids post-op; possibly dilutional as well  · Discontinue IV fluids  Encourage PO intake  · Repeat CBC in AM to monitor Hg  Thrombocytopenia (HCC)   Assessment & Plan    · Platelet count stable at 121 today  · Continue to monitor with use of Lovenox    · Obtain repeat CBC in AM           Essential hypertension   Assessment & Plan    · BP acceptable  · Continue Hyzaar and amlodipine  VTE Pharmacologic Prophylaxis:   Pharmacologic: Enoxaparin (Lovenox)  Mechanical VTE Prophylaxis in Place: Yes    Patient Centered Rounds: I have performed bedside rounds with nursing staff today  Discussions with Specialists or Other Care Team Provider: nursing    Education and Discussions with Family / Patient: patient    Time Spent for Care: 15 minutes  More than 50% of total time spent on counseling and coordination of care as described above  Current Length of Stay: 3 day(s)    Current Patient Status: Inpatient   Certification Statement: The patient will continue to require additional inpatient hospital stay due to per primary service    Discharge Plan: per primary service    Code Status: Level 1 - Full Code      Subjective:   Patient without complaints at this time  Denies left hip pain or chest pain/ SOB  No noted hematuria  Patient reports constipation since surgery  Objective:     Vitals:   Temp (24hrs), Av 2 °F (37 3 °C), Min:98 5 °F (36 9 °C), Max:100 6 °F (38 1 °C)    HR:  [87-92] 87  Resp:  [18] 18  BP: (127-140)/(60-66) 139/65  SpO2:  [91 %-93 %] 93 %  Body mass index is 31 79 kg/m²  Input and Output Summary (last 24 hours): Intake/Output Summary (Last 24 hours) at 18 1025  Last data filed at 18 0201   Gross per 24 hour   Intake              480 ml   Output              650 ml   Net             -170 ml       Physical Exam:     Physical Exam   Constitutional: She is oriented to person, place, and time  No distress  HENT:   Head: Normocephalic and atraumatic  Eyes: Conjunctivae are normal    Neck: Neck supple  Cardiovascular: Normal rate and regular rhythm  Pulmonary/Chest: Effort normal and breath sounds normal  No respiratory distress  Abdominal: Soft  Bowel sounds are normal  There is no tenderness     Musculoskeletal: She exhibits no edema  Neurological: She is alert and oriented to person, place, and time  Skin: Skin is warm and dry  Dressing intact on left hip   Psychiatric: She has a normal mood and affect  Her behavior is normal    Nursing note and vitals reviewed  Additional Data:     Labs:      Results from last 7 days  Lab Units 03/25/18  0439  03/22/18  1104   WBC Thousand/uL 7 55  < > 10 90*   HEMOGLOBIN g/dL 8 3*  < > 13 3   HEMATOCRIT % 25 8*  < > 39 0   PLATELETS Thousands/uL 121*  < > 201   NEUTROS PCT %  --   --  77*   LYMPHS PCT %  --   --  16   MONOS PCT %  --   --  6   EOS PCT %  --   --  1   < > = values in this interval not displayed  Results from last 7 days  Lab Units 03/23/18  0434   SODIUM mmol/L 142   POTASSIUM mmol/L 4 1   CHLORIDE mmol/L 107   CO2 mmol/L 28   BUN mg/dL 21   CREATININE mg/dL 0 77   CALCIUM mg/dL 8 0*   GLUCOSE RANDOM mg/dL 106       Results from last 7 days  Lab Units 03/23/18  0434   INR  0 98       * I Have Reviewed All Lab Data Listed Above  * Additional Pertinent Lab Tests Reviewed: All Labs Within Last 24 Hours Reviewed    Imaging:    Imaging Reports Reviewed Today Include: none  Imaging Personally Reviewed by Myself Includes:  none    Recent Cultures (last 7 days):           Last 24 Hours Medication List:     Current Facility-Administered Medications:  acetaminophen 650 mg Oral Q4H PRN Osvaldo Ball PA-C   amLODIPine 10 mg Oral Daily Osvaldo Ball PA-C   calcium carbonate 1,000 mg Oral Daily PRN Osvaldo Ball PA-C   docusate sodium 100 mg Oral BID Osvaldo Ball PA-C   enoxaparin 40 mg Subcutaneous Q24H Abhijeet Covington MD   losartan potassium-hydrochlorothiazide (HYZAAR 50/12  5) combination  Oral Daily Osvaldo Ball PA-C   morphine injection 4 mg Intravenous Q4H PRN Osvaldo Ball PA-C   nicotine 1 patch Transdermal Daily Osvaldo Ball PA-C   ondansetron 4 mg Intravenous Q6H PRN Osvaldo Ball PA-C   oxyCODONE 10 mg Oral Q4H PRN Osvaldo Ball PA-C   oxyCODONE 5 mg Oral Q4H PRN Molly Lombardi PA-C   pantoprazole 40 mg Oral Early Morning Molly Lombardi PA-C   senna 1 tablet Oral Daily Molly Lombardi PA-C        Today, Patient Was Seen By: Rodrigo Benoit PA-C    ** Please Note: Dictation voice to text software may have been used in the creation of this document   **

## 2018-03-25 NOTE — SOCIAL WORK
CM met with patient at bedside  Also present was family  CM reviewed PT recommendation of STR  Patient is agreeable  CM provided her with SNF list   Patient and family will review this and have choices in the AM for weekday MARCELINO  CM dept will continue to follow

## 2018-03-25 NOTE — PROGRESS NOTES
Orthopedics   Patricia Benson 69 y o  female MRN: [id number]  Unit/Bed#: -01      Subjective:  69 y  o female post operative day 2 left femoral intramedullary nail  Pt doing well  Pain controlled  Physical therapy is recommending rehab  Patient had an episode of dizziness yesterday when she got out of bed too quickly  Labs:    0  Lab Value Date/Time   HCT 25 8 (L) 03/25/2018 0439   HCT 29 1 (L) 03/24/2018 2035   HCT 27 9 (L) 03/24/2018 0851   HCT 42 3 05/16/2015 1158   HGB 8 3 (L) 03/25/2018 0439   HGB 9 5 (L) 03/24/2018 2035   HGB 9 0 (L) 03/24/2018 0851   HGB 14 1 05/16/2015 1158   INR 0 98 03/23/2018 0434   WBC 7 55 03/25/2018 0439   WBC 6 77 03/24/2018 0851   WBC 7 00 03/23/2018 0434   WBC 5 19 05/16/2015 1158       Meds:    Current Facility-Administered Medications:     acetaminophen (TYLENOL) tablet 650 mg, 650 mg, Oral, Q4H PRN, Trae Suarez PA-C, 650 mg at 03/25/18 0840    amLODIPine (NORVASC) tablet 10 mg, 10 mg, Oral, Daily, Trae Suarez PA-C, 10 mg at 03/25/18 2603    calcium carbonate (TUMS) chewable tablet 1,000 mg, 1,000 mg, Oral, Daily PRN, Trae Suarez PA-C    docusate sodium (COLACE) capsule 100 mg, 100 mg, Oral, BID, Trae Suarez PA-C, 100 mg at 03/25/18 0835    enoxaparin (LOVENOX) subcutaneous injection 40 mg, 40 mg, Subcutaneous, Q24H [address], Rickie Beach MD, 40 mg at 03/25/18 0835    losartan potassium-hydrochlorothiazide (HYZAAR 50/12  5) combination, , Oral, Daily, Trae Suarez PA-C    morphine (PF) 4 mg/mL injection 4 mg, 4 mg, Intravenous, Q4H PRN, Trae Suarez PA-C, 4 mg at 03/25/18 0135    nicotine (NICODERM CQ) 14 mg/24hr TD 24 hr patch 1 patch, 1 patch, Transdermal, Daily, Trae Suarez PA-C    ondansetron [facility]) injection 4 mg, 4 mg, Intravenous, Q6H PRN, Trae Suarez PA-C, 4 mg at 03/23/18 0340    oxyCODONE (ROXICODONE) immediate release tablet 10 mg, 10 mg, Oral, Q4H PRN, Trae Suarez PA-C, 10 mg at 03/24/18 1422    oxyCODONE (ROXICODONE) IR tablet 5 mg, 5 mg, Oral, Q4H PRN, Jovan Ortiz PA-C    pantoprazole (PROTONIX) EC tablet 40 mg, 40 mg, Oral, Early Morning, KWASI Duran-JESUSITA, 40 mg at 03/25/18 0546    senna (SENOKOT) tablet 8 6 mg, 1 tablet, Oral, Daily, Jovan Ortiz PA-C, 8 6 mg at 03/25/18 8589            Physical exam:  Vitals:    03/25/18 0831   BP: 139/65   Pulse: 87   Resp: 18   Temp: 98 5 °F (36 9 °C)   SpO2: 93%     Pain controlled, no acute distress  left lower extremity  · Dressings clean dry intact  · Sensation intact L1-S1  · Motor intact to knee flexion/extension, EHL/FHL  · 2+ dorsalis pedis pulse    _*_*_*_*_*_*_*_*_*_*_*_*_*_*_*_*_*_*_*_*_*_*_*_*_*_*_*_*_*_*_*_*_*_*_*_*_*_*_*_*_*    Assessment: 69 y  o female post operative day 2 left femur IMN   Pt doing well    Plan:  · Up and out of bed  · Weightbearing as tolerated LLE  · PT/OT  · DVT prophylaxis  · Analgesics  · Dispo: Ortho will follow  · Patient noted to have acute blood loss anemia due to a drop in Hbg of > 2 0g from preop levels, will monitor vital signs and resuscitate with IV fluids as needed    Missy Calderón MD

## 2018-03-26 VITALS
HEART RATE: 83 BPM | TEMPERATURE: 98.2 F | SYSTOLIC BLOOD PRESSURE: 141 MMHG | RESPIRATION RATE: 20 BRPM | OXYGEN SATURATION: 93 % | WEIGHT: 179.45 LBS | BODY MASS INDEX: 31.8 KG/M2 | DIASTOLIC BLOOD PRESSURE: 68 MMHG | HEIGHT: 63 IN

## 2018-03-26 PROBLEM — R50.9 FEVER: Status: ACTIVE | Noted: 2018-03-26

## 2018-03-26 LAB
ERYTHROCYTE [DISTWIDTH] IN BLOOD BY AUTOMATED COUNT: 11.6 % (ref 11.6–15.1)
HCT VFR BLD AUTO: 24.1 % (ref 34.8–46.1)
HGB BLD-MCNC: 7.7 G/DL (ref 11.5–15.4)
MCH RBC QN AUTO: 30.8 PG (ref 26.8–34.3)
MCHC RBC AUTO-ENTMCNC: 32 G/DL (ref 31.4–37.4)
MCV RBC AUTO: 96 FL (ref 82–98)
PLATELET # BLD AUTO: 134 THOUSANDS/UL (ref 149–390)
PMV BLD AUTO: 11.8 FL (ref 8.9–12.7)
RBC # BLD AUTO: 2.5 MILLION/UL (ref 3.81–5.12)
WBC # BLD AUTO: 6.3 THOUSAND/UL (ref 4.31–10.16)

## 2018-03-26 PROCEDURE — 97110 THERAPEUTIC EXERCISES: CPT

## 2018-03-26 PROCEDURE — 99231 SBSQ HOSP IP/OBS SF/LOW 25: CPT | Performed by: PHYSICIAN ASSISTANT

## 2018-03-26 PROCEDURE — 99024 POSTOP FOLLOW-UP VISIT: CPT | Performed by: PHYSICIAN ASSISTANT

## 2018-03-26 PROCEDURE — 85027 COMPLETE CBC AUTOMATED: CPT | Performed by: PHYSICIAN ASSISTANT

## 2018-03-26 PROCEDURE — 97116 GAIT TRAINING THERAPY: CPT

## 2018-03-26 RX ORDER — OXYCODONE HYDROCHLORIDE 5 MG/1
TABLET ORAL
Qty: 60 TABLET | Refills: 0
Start: 2018-03-26 | End: 2018-05-17 | Stop reason: ALTCHOICE

## 2018-03-26 RX ADMIN — SENNOSIDES 8.6 MG: 8.6 TABLET, FILM COATED ORAL at 08:06

## 2018-03-26 RX ADMIN — ENOXAPARIN SODIUM 40 MG: 40 INJECTION SUBCUTANEOUS at 08:06

## 2018-03-26 RX ADMIN — HYDROCHLOROTHIAZIDE: 12.5 TABLET ORAL at 08:05

## 2018-03-26 RX ADMIN — DOCUSATE SODIUM 100 MG: 100 CAPSULE, LIQUID FILLED ORAL at 08:05

## 2018-03-26 RX ADMIN — OXYCODONE HYDROCHLORIDE 10 MG: 10 TABLET ORAL at 08:05

## 2018-03-26 RX ADMIN — PANTOPRAZOLE SODIUM 40 MG: 40 TABLET, DELAYED RELEASE ORAL at 05:21

## 2018-03-26 RX ADMIN — AMLODIPINE BESYLATE 10 MG: 10 TABLET ORAL at 08:05

## 2018-03-26 NOTE — ASSESSMENT & PLAN NOTE
· BP has improved  · Patient with episode of hypotension and near-syncope yesterday afternoon  Patient had received IV morphine prior to onset of symptoms  · Hypotension improved with IV fluids  · Dizziness has improved  Orthostatics negative  · Continue Hyzaar and amlodipine

## 2018-03-26 NOTE — ASSESSMENT & PLAN NOTE
· Orthopedic surgery primary  · POD #3 s/p left hip long trochanteric femoral nail  · WBAT per ortho  · PT recommending rehab  · Continue current pain control regimen

## 2018-03-26 NOTE — SOCIAL WORK
MARCELINO lm for [facility] at 11:30am and did not hear back  CM met with pt and  at bedside to offer [facility] as pt was accepted  Pt and  reported they would agree if [facility] declines as pt lives very close with family in area and granddaughter works at [facility]  CM reported try again and follow up at bedside

## 2018-03-26 NOTE — SOCIAL WORK
CM s/w pt at bedside to discuss SNF choices  Pt reports choices are 1) [facility] 2) [facility] 3) [facility]  Pt is in agreement to [facility] [place]  CM department to follow up to discuss choices  CM sent referral  Pt has no other needs

## 2018-03-26 NOTE — POST OP PROGRESS NOTES
Orthopedics   Tashi Bar 69 y o  female MRN: [id number]  Unit/Bed#: -01      Subjective:  Patient seen and evaluated  Denies any pain with regards to her left hip  Patient has been noting urinary retention  No fevers or chills    Labs:    0  Lab Value Date/Time   HCT 24 1 (L) 03/26/2018 0431   HCT 27 3 (L) 03/25/2018 1232   HCT 25 8 (L) 03/25/2018 0439   HCT 42 3 05/16/2015 1158   HGB 7 7 (L) 03/26/2018 0431   HGB 9 0 (L) 03/25/2018 1232   HGB 8 3 (L) 03/25/2018 0439   HGB 14 1 05/16/2015 1158   INR 0 98 03/23/2018 0434   WBC 6 30 03/26/2018 0431   WBC 7 55 03/25/2018 0439   WBC 6 77 03/24/2018 0851   WBC 5 19 05/16/2015 1158       Meds:    Current Facility-Administered Medications:     acetaminophen (TYLENOL) tablet 650 mg, 650 mg, Oral, Q4H PRN, Edger Meth, PA-C, 650 mg at 03/25/18 0840    amLODIPine (NORVASC) tablet 10 mg, 10 mg, Oral, Daily, Edger Meth, PA-C, 10 mg at 03/25/18 0869    calcium carbonate (TUMS) chewable tablet 1,000 mg, 1,000 mg, Oral, Daily PRN, Edger Meth, PA-C    docusate sodium (COLACE) capsule 100 mg, 100 mg, Oral, BID, Edger Meth, PA-C, 100 mg at 03/25/18 0835    enoxaparin (LOVENOX) subcutaneous injection 40 mg, 40 mg, Subcutaneous, Q24H [address], Janie Piña MD, 40 mg at 03/25/18 0835    losartan potassium-hydrochlorothiazide (HYZAAR 50/12  5) combination, , Oral, Daily, Edger Meth, PA-C    morphine (PF) 4 mg/mL injection 4 mg, 4 mg, Intravenous, Q4H PRN, Edger Meth, PA-C, 4 mg at 03/25/18 1118    nicotine (NICODERM CQ) 14 mg/24hr TD 24 hr patch 1 patch, 1 patch, Transdermal, Daily, Edger Meth, PA-C    ondansetron [facility]) injection 4 mg, 4 mg, Intravenous, Q6H PRN, Edger Meth, PA-C, 4 mg at 03/25/18 1212    oxyCODONE (ROXICODONE) immediate release tablet 10 mg, 10 mg, Oral, Q4H PRN, Edger Meth, PA-C, 10 mg at 03/24/18 1422    oxyCODONE (ROXICODONE) IR tablet 5 mg, 5 mg, Oral, Q4H PRN, Edger Meth, PA-C    pantoprazole (PROTONIX) EC tablet 40 mg, 40 mg, Oral, Early Morning, Alejandro Madsen PA-C, 40 mg at 03/26/18 5837    senna (SENOKOT) tablet 8 6 mg, 1 tablet, Oral, Daily, Alejandro Madsen, PA-C, 8 6 mg at 03/25/18 0835    sodium chloride 0 9 % infusion, 75 mL/hr, Intravenous, Continuous, KWASI Kidd-C, Last Rate: 75 mL/hr at 03/25/18 1424, 75 mL/hr at 03/25/18 1424    Physical exam:  Vitals:    03/25/18 2300   BP: 119/58   Pulse: 85   Resp: 18   Temp: 99 3 °F (37 4 °C)   SpO2: 92%      Left hip:  · Dressings C/D/I  · Thigh compartments soft  · Motor/Sensation intact LLE  · Palpable DP Pulse    _*_*_*_*_*_*_*_*_*_*_*_*_*_*_*_*_*_*_*_*_*_*_*_*_*_*_*_*_*_*_*_*_*_*_*_*_*_*_*_*_*    Assessment: 69 y  o female POD 3 s/o Left Hip Long TFN    Plan:  Pain Control  WBAT LLE  PT/OT  DVT proph, Lovenox  ABLA, Hgb 7 7 this am  Continue to monitor  Urinary Retention   Will straight cath    NAVEED NessC

## 2018-03-26 NOTE — PHYSICAL THERAPY NOTE
PHYSICAL THERAPY TREATMENT NOTE  NAME:  Bandar Larsen  DATE: 03/26/18    Length Of Stay: 4  Performed at least 2 patient identifiers during session: Name, Birthday and ID bracelet    TREATMENT:      03/26/18 1026   Pain Assessment   Pain Assessment 0-10   Pain Score No Pain  (@ rest, )   Pain Type Acute pain   Pain Location Hip;Knee   Pain Orientation Left   Effect of Pain on Daily Activities limits speed and indep of mobility   Patient's Stated Pain Goal No pain   Restrictions/Precautions   Weight Bearing Precautions Per Order Yes   LLE Weight Bearing Per Order WBAT   Other Precautions Bed Alarm; Chair Alarm;Pain; Fall Risk;Telemetry   General   Chart Reviewed Yes   Family/Caregiver Present Yes   Cognition   Overall Cognitive Status WFL   Orientation Level Oriented X4   Memory Within functional limits   Following Commands Follows one step commands with increased time or repetition   Subjective   Subjective Pt cooperative, reports feeling better with diffrernt pain medication compared to morphine prior to session  Bed Mobility   Supine to Sit (Pt OOB via nursing prior to session)   Transfers   Sit to Stand 4  Minimal assistance   Additional items Assist x 1; Increased time required;Verbal cues  (instruction for hand and foot placement)   Stand to Sit 4  Minimal assistance   Additional items Verbal cues; Increased time required; Bedrails  (instruction for hand and foot placement)   Ambulation/Elevation   Gait pattern Antalgic;Decreased L stance; Foward flexed; Excessively slow; Step to;Short stride   Gait Assistance 4  Minimal assist   Additional items Assist x 1;Verbal cues  (sequencing)   Assistive Device Rolling walker   Distance 10'x2   Stair Management Assistance Not tested   Balance   Static Sitting Fair +   Static Standing Poor +   Ambulatory Poor +   Endurance Deficit   Endurance Deficit Yes   Endurance Deficit Description limited amb distance   Activity Tolerance   Activity Tolerance Patient limited by pain;Patient limited by fatigue   Nurse Made Aware spoke to [employer] rE: care coordination   Exercises   Quad Sets Sitting;10 reps;AROM; Left   Hip Flexion Sitting;10 reps;AROM; Left   Hip Abduction Sitting;10 reps; Left;AAROM   Hip Adduction Sitting;10 reps; Left;AAROM   Knee AROM Short Arc Quad Sitting;10 reps;AROM; Left;AAROM   Knee AROM Long Arc Quad Sitting;10 reps;AROM; Left;AAROM   Ankle Pumps Sitting;10 reps;AROM; Left;AAROM   Assessment   Prognosis Good   Problem List Decreased strength;Decreased range of motion;Decreased endurance; Impaired balance;Decreased mobility;Orthopedic restrictions;Pain;Obesity  (gait deviations)   Assessment Pt did make progress since her initial eval  Pt needed less physical A for transfers, and is amb today for multiple sessions with a rolling walker  Pt was also able to perform LE ther ex this session with some assistance  Recommend continued trials with rolling walker and LE ther ex   Barriers to Discharge Decreased caregiver support; Inaccessible home environment   Goals   Patient Goals less pain   STG Expiration Date 03/31/18   Treatment Day 1   Plan   Treatment/Interventions Functional transfer training;LE strengthening/ROM;Spoke to nursing;Gait training;Bed mobility; Patient/family training;Cognitive reorientation; Endurance training; Therapeutic exercise;Elevations; Equipment eval/education;OT   Progress Slow progress, decreased activity tolerance   PT Frequency 7x/wk   Recommendation   Recommendation Post acute IP rehab   Equipment Recommended Mace Ohms, PT, DPT

## 2018-03-26 NOTE — ASSESSMENT & PLAN NOTE
· ABLA  · Hg 7 7  · Patient has been receiving IV fluids; possibly dilutional as well  · Discontinue IV fluids  Encourage PO intake

## 2018-03-26 NOTE — PLAN OF CARE
Problem: PHYSICAL THERAPY ADULT  Goal: Performs mobility at highest level of function for planned discharge setting  See evaluation for individualized goals  Treatment/Interventions: Functional transfer training, LE strengthening/ROM, Elevations, Therapeutic exercise, Endurance training, Bed mobility, Gait training, Spoke to nursing, OT  Equipment Recommended: Martha Banks       See flowsheet documentation for full assessment, interventions and recommendations  Outcome: Progressing  Prognosis: Good  Problem List: Decreased strength, Decreased range of motion, Decreased endurance, Impaired balance, Decreased mobility, Orthopedic restrictions, Pain, Obesity (gait deviations)  Assessment: Pt did make progress since her initial eval  Pt needed less physical A for transfers, and is amb today for multiple sessions with a rolling walker  Pt was also able to perform LE ther ex this session with some assistance  Recommend continued trials with rolling walker and LE ther ex  Barriers to Discharge: Decreased caregiver support, Inaccessible home environment  Barriers to Discharge Comments: 1 NAYE home  Recommendation: Post acute IP rehab     PT - OK to Discharge: Yes    See flowsheet documentation for full assessment

## 2018-03-26 NOTE — PROGRESS NOTES
Progress Note - Viviana Solano 1950, 69 y o  female MRN: [id number]    Unit/Bed#: -01 Encounter: [id number]    Primary Care Provider: Desmond Ybarra MD   Date and time admitted to hospital: 3/22/2018 10:38 AM        * Closed intertrochanteric fracture of left hip, initial encounter [facility])   Assessment & Plan    · Orthopedic surgery primary  · POD #3 s/p left hip long trochanteric femoral nail  · WBAT per ortho  · PT recommending rehab  · Continue current pain control regimen  Fever   Assessment & Plan    · Patient with temp of 100 6 on 3/25/18  · Possibly secondary to post-op atelectasis  · Continue IS  · Remains afebrile since then  · Chest x-ray: no evidence of PNA  · UA negative  Anemia   Assessment & Plan    · ABLA  · Hg 7 7  · Patient has been receiving IV fluids; possibly dilutional as well  · Discontinue IV fluids  Encourage PO intake  Thrombocytopenia (HCC)   Assessment & Plan    · Platelet count stable at 134 today  · Continue to monitor with use of Lovenox  Essential hypertension   Assessment & Plan    · BP has improved  · Patient with episode of hypotension and near-syncope yesterday afternoon  Patient had received IV morphine prior to onset of symptoms  · Hypotension improved with IV fluids  · Dizziness has improved  Orthostatics negative  · Continue Hyzaar and amlodipine  VTE Pharmacologic Prophylaxis:   Pharmacologic: Enoxaparin (Lovenox)  Mechanical VTE Prophylaxis in Place: Yes    Patient Centered Rounds: I have performed bedside rounds with nursing staff today  Discussions with Specialists or Other Care Team Provider: nursing    Education and Discussions with Family / Patient: patient    Time Spent for Care: 20 minutes  More than 50% of total time spent on counseling and coordination of care as described above      Current Length of Stay: 4 day(s)    Current Patient Status: Inpatient   Certification Statement: The patient will continue to require additional inpatient hospital stay due to per primary service    Discharge Plan: per primary service    Code Status: Level 1 - Full Code      Subjective:   Patient ambulated to chair with PT  She denies dizziness or lightheadedness  BP has improved and orthostatics were negative this AM  She denies chest pain, SOB or hip pain at this time  Objective:     Vitals:   Temp (24hrs), Av 6 °F (37 °C), Min:98 2 °F (36 8 °C), Max:99 3 °F (37 4 °C)    HR:  [77-88] 83  Resp:  [18-20] 20  BP: ()/(50-69) 141/68  SpO2:  [92 %-93 %] 93 %  Body mass index is 31 79 kg/m²  Input and Output Summary (last 24 hours): Intake/Output Summary (Last 24 hours) at 18 1017  Last data filed at 18 0715   Gross per 24 hour   Intake              680 ml   Output             1300 ml   Net             -620 ml       Physical Exam:     Physical Exam   Constitutional: She is oriented to person, place, and time  No distress  HENT:   Head: Normocephalic and atraumatic  Eyes: Conjunctivae are normal    Neck: Normal range of motion  Cardiovascular: Normal rate and regular rhythm  Pulmonary/Chest: Effort normal  No respiratory distress  She has wheezes (mild expiratory wheeze)  Abdominal: Soft  Bowel sounds are normal  She exhibits no distension  There is no tenderness  Musculoskeletal: Normal range of motion  She exhibits no edema  Neurological: She is alert and oriented to person, place, and time  Skin: Skin is warm and dry  She is not diaphoretic  No erythema  Psychiatric: She has a normal mood and affect  Her behavior is normal    Nursing note and vitals reviewed        Additional Data:     Labs:      Results from last 7 days  Lab Units 18  0431  18  1104   WBC Thousand/uL 6 30  < > 10 90*   HEMOGLOBIN g/dL 7 7*  < > 13 3   HEMATOCRIT % 24 1*  < > 39 0   PLATELETS Thousands/uL 134*  < > 201   NEUTROS PCT %  --   --  77*   LYMPHS PCT %  --   -- 16   MONOS PCT %  --   --  6   EOS PCT %  --   --  1   < > = values in this interval not displayed  Results from last 7 days  Lab Units 03/23/18  0434   SODIUM mmol/L 142   POTASSIUM mmol/L 4 1   CHLORIDE mmol/L 107   CO2 mmol/L 28   BUN mg/dL 21   CREATININE mg/dL 0 77   CALCIUM mg/dL 8 0*   GLUCOSE RANDOM mg/dL 106       Results from last 7 days  Lab Units 03/23/18  0434   INR  0 98       * I Have Reviewed All Lab Data Listed Above  * Additional Pertinent Lab Tests Reviewed: All Labs Within Last 24 Hours Reviewed    Imaging:    Imaging Reports Reviewed Today Include: none  Imaging Personally Reviewed by Myself Includes:  none    Recent Cultures (last 7 days):           Last 24 Hours Medication List:     Current Facility-Administered Medications:  acetaminophen 650 mg Oral Q4H PRN Jose Tena, PA-C    amLODIPine 10 mg Oral Daily Jose Tena, PA-C    calcium carbonate 1,000 mg Oral Daily PRN Jose Tena, PA-C    docusate sodium 100 mg Oral BID Jose Tena, PA-C    enoxaparin 40 mg Subcutaneous Q24H Chris Escobar MD    losartan potassium-hydrochlorothiazide (HYZAAR 50/12  5) combination  Oral Daily Jose Tena, PA-C    nicotine 1 patch Transdermal Daily Jose Tena, PA-JESUSITA    ondansetron 4 mg Intravenous Q6H PRN Jose Tena, PA-C    oxyCODONE 10 mg Oral Q4H PRN Jose Tena, PA-C    oxyCODONE 5 mg Oral Q4H PRN Jose Tena, PA-C    pantoprazole 40 mg Oral Early Morning Jose Tena, PA-C    senna 1 tablet Oral Daily Jose Tena, PA-JESUSITA    sodium chloride 75 mL/hr Intravenous Continuous Lien Barry PA-C Last Rate: 75 mL/hr (03/25/18 1424)        Today, Patient Was Seen By: Lien Barry PA-C    ** Please Note: Dictation voice to text software may have been used in the creation of this document   **

## 2018-03-26 NOTE — SOCIAL WORK
Per Thomas Black pt was accepted  CM spoke with Annamarie Rodriges and setup WCV for [phone]  CM met with pt and  at bedside and they were in agreement to [facility]  CM reviewed IMM  CM updated Dewayne Mcneill  CM updated Jennifer Pascual pt and family chose [facility] and to disregard  CM updated nurse  Pt has no other needs at this time

## 2018-03-26 NOTE — ASSESSMENT & PLAN NOTE
· Patient with temp of 100 6 on 3/25/18  · Possibly secondary to post-op atelectasis  · Continue IS  · Remains afebrile since then  · Chest x-ray: no evidence of PNA  · UA negative

## 2018-03-27 LAB
ATRIAL RATE: 66 BPM
ATRIAL RATE: 72 BPM
P AXIS: 72 DEGREES
P AXIS: 78 DEGREES
PR INTERVAL: 194 MS
PR INTERVAL: 204 MS
QRS AXIS: 33 DEGREES
QRS AXIS: 43 DEGREES
QRSD INTERVAL: 74 MS
QRSD INTERVAL: 78 MS
QT INTERVAL: 402 MS
QT INTERVAL: 406 MS
QTC INTERVAL: 425 MS
QTC INTERVAL: 440 MS
T WAVE AXIS: 48 DEGREES
T WAVE AXIS: 53 DEGREES
VENTRICULAR RATE: 66 BPM
VENTRICULAR RATE: 72 BPM

## 2018-03-27 PROCEDURE — 93010 ELECTROCARDIOGRAM REPORT: CPT | Performed by: INTERNAL MEDICINE

## 2018-03-28 ENCOUNTER — DOCUMENTATION (OUTPATIENT)
Dept: FAMILY MEDICINE CLINIC | Facility: CLINIC | Age: 68
End: 2018-03-28

## 2018-04-02 NOTE — DISCHARGE SUMMARY
Discharge Summary - Orthopedics   Jose Guadalupe Ford 69 y o  female MRN: [id number]  Unit/Bed#:     Attending Physician: Dr Harsha Chang    Admitting diagnosis: Hip pain [M25 559]  Closed fracture of left hip, initial encounter [facility]) [S72 002A]  Closed intertrochanteric fracture of left hip, initial encounter [facility]) Gail Tatianna    Discharge diagnosis: Hip pain [M25 559]  Closed fracture of left hip, initial encounter [facility]) [S72 002A]  Closed [facility]trochanteric [facility] of left hip[facility]    Date of admission: 3/22/2018    Date of discharge: 3/26/2018    Procedure: Left Hip Long IM nail 3/23/18    Hospital course:  69-year-old female who sustained a mechanical fall on 3/22/18  She reported to the emergency department after the fall due to severe left hip pain  X-rays in the emergency department revealed a left hip fracture  She was admitted to the orthopedic service  She underwent a left hip long IM nail fracture fixation on 3/23/18 by Dr Harsha Chang  She was transferred to the floor in stable condition  On the floor she received adequate pain control, DVT prophylaxis, PT/OT, and medical co-mgmt by [facility]  The remainder of her hospital course was unremarkable  She was discharged in stable condition on 3/26/18  Discharge Instructions:   · WBAT LLE  · Keep Incisionsclean and dry at all times   · Complete DVT prophylaxis as prescribed   · Physical therapy  · Follow-up as scheduled, otherwise call for appt  Discharge Medications: For the complete list of discharge medications, please refer to the patient's medication reconciliation

## 2018-04-04 ENCOUNTER — APPOINTMENT (OUTPATIENT)
Dept: RADIOLOGY | Facility: CLINIC | Age: 68
End: 2018-04-04
Payer: COMMERCIAL

## 2018-04-04 ENCOUNTER — OFFICE VISIT (OUTPATIENT)
Dept: OBGYN CLINIC | Facility: CLINIC | Age: 68
End: 2018-04-04

## 2018-04-04 VITALS — DIASTOLIC BLOOD PRESSURE: 71 MMHG | HEART RATE: 72 BPM | SYSTOLIC BLOOD PRESSURE: 128 MMHG

## 2018-04-04 DIAGNOSIS — S72.142A CLOSED INTERTROCHANTERIC FRACTURE OF LEFT HIP, INITIAL ENCOUNTER (HCC): ICD-10-CM

## 2018-04-04 DIAGNOSIS — S72.142A CLOSED INTERTROCHANTERIC FRACTURE OF LEFT HIP, INITIAL ENCOUNTER (HCC): Primary | ICD-10-CM

## 2018-04-04 PROCEDURE — 73552 X-RAY EXAM OF FEMUR 2/>: CPT

## 2018-04-04 PROCEDURE — 99024 POSTOP FOLLOW-UP VISIT: CPT | Performed by: ORTHOPAEDIC SURGERY

## 2018-04-04 NOTE — PROGRESS NOTES
Patient Name:  Fabio Casillas  MRN:  [id number]    Assessment & Plan    Left Hip Long IM nail for IT fracture 3/23/18  1  Weightbearing as tolerated left lower extremity  2  Continue physical therapy  3  Complete course of Lovenox  4  Follow-up in four weeks for repeat evaluation with repeat x-rays of the left femur      Subjective    57-year-old female reports to the office today for follow-up status post Left Hip Long IM nail for IT fracture 3/23/18      Objective    /71   Pulse 72     Left hip:  Incisions well healed without erythema or drainage  Staples removed  Steri-Strips applied  No tenderness to palpation greater trochanter  Hip range of motion intact without discomfort  Motor and sensation intact left lower extremity  Brisk capillary refill      Data Review    I have personally reviewed pertinent films in PACS, and my interpretation follows  X-rays performed today of the left femur reveals stable intact orthopedic hardware in satisfactory alignment

## 2018-04-20 ENCOUNTER — TELEPHONE (OUTPATIENT)
Dept: FAMILY MEDICINE CLINIC | Facility: CLINIC | Age: 68
End: 2018-04-20

## 2018-04-20 DIAGNOSIS — I10 ESSENTIAL HYPERTENSION: Primary | ICD-10-CM

## 2018-04-20 RX ORDER — LOSARTAN POTASSIUM 100 MG/1
100 TABLET ORAL DAILY
Qty: 90 TABLET | Refills: 0 | Status: SHIPPED | OUTPATIENT
Start: 2018-04-20 | End: 2018-05-17 | Stop reason: ALTCHOICE

## 2018-04-20 NOTE — TELEPHONE ENCOUNTER
Spoke  With  Pt  She  Had  Fractured  Her  Femure  Pt   tr[place]  When in rehab  Her  bp  meds  Were changed  She is  Requesting  Refills  Pt  Unable  To   Come  To  The  Office yet  She  Is receiving  Home  Therapy   Twice  Per  Week  Her  bp  Readings  Have  Been  Good  Pt  To  Continue  Amlodipine  10 mg   Daily  Refill losartan  100 mg  Daily    Stop  Losartan-hctz50-12 5

## 2018-05-02 ENCOUNTER — PATIENT OUTREACH (OUTPATIENT)
Dept: FAMILY MEDICINE CLINIC | Facility: CLINIC | Age: 68
End: 2018-05-02

## 2018-05-02 ENCOUNTER — APPOINTMENT (OUTPATIENT)
Dept: RADIOLOGY | Facility: CLINIC | Age: 68
End: 2018-05-02
Payer: MEDICARE

## 2018-05-02 ENCOUNTER — OFFICE VISIT (OUTPATIENT)
Dept: OBGYN CLINIC | Facility: CLINIC | Age: 68
End: 2018-05-02

## 2018-05-02 VITALS — DIASTOLIC BLOOD PRESSURE: 72 MMHG | SYSTOLIC BLOOD PRESSURE: 155 MMHG | HEART RATE: 85 BPM

## 2018-05-02 DIAGNOSIS — S72.142A CLOSED INTERTROCHANTERIC FRACTURE OF LEFT HIP, INITIAL ENCOUNTER (HCC): ICD-10-CM

## 2018-05-02 DIAGNOSIS — S72.142A CLOSED INTERTROCHANTERIC FRACTURE OF LEFT HIP, INITIAL ENCOUNTER (HCC): Primary | ICD-10-CM

## 2018-05-02 PROCEDURE — 73552 X-RAY EXAM OF FEMUR 2/>: CPT

## 2018-05-02 PROCEDURE — 99024 POSTOP FOLLOW-UP VISIT: CPT | Performed by: ORTHOPAEDIC SURGERY

## 2018-05-02 NOTE — PROGRESS NOTES
Patient Name:  Evaristo Chaudhary  MRN:  [id number]    Assessment & Plan    Left Hip Long IM nail for IT fracture 3/23/18  1  Weightbearing as tolerated left lower extremity  2  Referral provided for outpatient physical therapy  3  Gradually return to normal activities as tolerated  4  Follow-up as needed  Subjective    75-year-old female returns to the office today status post Left Hip Long IM nail for IT fracture 3/23/18  Today she denies any significant pain  She notes mild discomfort  She ambulates with a cane  No numbness or tingling  She is participating in home physical therapy  No fevers or chills  Objective    /72   Pulse 85     Left hip:  No gross deformity  Mild tenderness to palpation greater trochanter  Hip range of motion is intact without discomfort  Negative logroll test   Appropriate hip flexion and abduction strength  Sensation intact left lower extremity      Data Review    I have personally reviewed pertinent films in PACS, and my interpretation follows  X-rays performed today of the left femur reveals stable intact orthopedic hardware in satisfactory  Interval healing evident        Scribe Attestation    I,:   Catherine Araujo PA-C am acting as a scribe while in the presence of the attending physician :        I,:   Ke Acosta MD personally performed the services described in this documentation    as scribed in my presence :

## 2018-05-02 NOTE — PROGRESS NOTES
Doing good  Patient had recent fall with closed left hip fracture s/p surgical repair  D/C to [facility] and home with [facility] PT/OT services  Currently, receiving home PT  Had ortho follow up today and script written to begin outpatient PT  Denies complaints of pain, weakness, fatigue  Ambulating with cane  Lives with spouse  Good support system  All prescriptions filled and taking as prescribed  Denies any needs or concerns  My contact information provided and encouraged to call if needed

## 2018-05-09 ENCOUNTER — EVALUATION (OUTPATIENT)
Dept: PHYSICAL THERAPY | Facility: MEDICAL CENTER | Age: 68
End: 2018-05-09
Payer: MEDICARE

## 2018-05-09 DIAGNOSIS — M25.552 LEFT HIP PAIN: Primary | ICD-10-CM

## 2018-05-09 DIAGNOSIS — S72.142A CLOSED INTERTROCHANTERIC FRACTURE OF LEFT HIP, INITIAL ENCOUNTER (HCC): ICD-10-CM

## 2018-05-09 PROCEDURE — G8979 MOBILITY GOAL STATUS: HCPCS | Performed by: PHYSICAL THERAPIST

## 2018-05-09 PROCEDURE — 97110 THERAPEUTIC EXERCISES: CPT | Performed by: PHYSICAL THERAPIST

## 2018-05-09 PROCEDURE — G8978 MOBILITY CURRENT STATUS: HCPCS | Performed by: PHYSICAL THERAPIST

## 2018-05-09 PROCEDURE — 97162 PT EVAL MOD COMPLEX 30 MIN: CPT | Performed by: PHYSICAL THERAPIST

## 2018-05-09 NOTE — PROGRESS NOTES
PT Evaluation     Today's date: 2018  Patient name: Jerzy Cruz  : 1950  MRN: [id number]  Referring provider: Marla Alexander MD  Dx:   Encounter Diagnosis     ICD-10-CM    1  Left hip pain M25 552    2  Closed intertrochanteric fracture of left hip, initial encounter [facility]) [id number] Ambulatory referral to Physical Therapy                  Assessment  Impairments: abnormal gait, abnormal or restricted ROM, impaired balance, impaired physical strength, lacks appropriate home exercise program and pain with function    Assessment details: Pt is an alert and oriented 68 yo female, referred to out-pt PT s/p L intertrochanteric fx from a fall on ice on 3/22/18  Pt underwent ORIF on 3/23/18  Pt is WBAT LLE  Pt had home therapy for 3 weeks, and denies any post-op complications  Pt's c/c is difficulty walking  Upon examination, pt demonstrates tissue tenderness, gait deficits, decreased L hip strength and ROM  Pt will benefit from skilled PT to address the deficits listed above  Thank you for your referral   Understanding of Dx/Px/POC: good and excellent   Prognosis: good    Goals  ST: Improved ROM by 25% in 4 weeks to assist with all functional activities  2: Improve strength by 1/2 grade in 4 weeks to assist with all functional activities  LT: Improved ROM to WFL's in 4-6 weeks to assist with all functional activities  2:  Increase strength to WFL's in 4-6 weeks to assist with all functional activities        Plan  Patient would benefit from: skilled PT  Planned modality interventions: cryotherapy  Planned therapy interventions: manual therapy, neuromuscular re-education, therapeutic activities, therapeutic exercise, home exercise program and therapeutic training  Frequency: 2x week  Duration in visits: 12  Duration in weeks: 6  Treatment plan discussed with: patient        Subjective Evaluation    History of Present Illness  Date of onset: 3/22/2018  Date of surgery: 3/23/2018  Mechanism of injury: Manual Fitting, and underwent L hip ORIF  Quality of life: excellent    Pain  Current pain ratin  At best pain ratin  At worst pain ratin    Social Support  Steps to enter house: yes  1  Stairs in house: yes   1  Lives in: one-story house    Treatments  Current treatment: physical therapy  Patient Goals  Patient goals for therapy: decreased pain, improved balance, increased motion, increased strength and independence with ADLs/IADLs          Objective     Observations     Additional Observation Details  3 incision sites well healed, mild warmth present  (-)Vick's    Ambulation:  Pt amb ind with SPC, slowed gamal, decreased step length and stance time L LE    Palpation     Additional Palpation Details  Tenderness surrounding prox incision site only      Neurological Testing     Sensation     Hip   Left Hip   Intact: light touch    Right Hip   Intact: light touch    Additional Neurological Details  Pt denies numbness into b/l LE's/    Active Range of Motion   Left Hip   Flexion: 75 degrees   Extension: -5 degrees   Abduction: 15 degrees   External rotation (90/90): 15 degrees   Internal rotation (90/90): 15 degrees     Passive Range of Motion   Left Hip   Flexion: 80 degrees   Extension: -3 degrees   Abduction: 20 degrees     Strength/Myotome Testing     Left Hip   Planes of Motion   Flexion: 3+  Extension: 3+  Abduction: 3  Adduction: 3+  External rotation: 3  Internal rotation: 3+          Precautions: HTN    Daily Treatment Diary     Manual              MFR L lat hip nv            PROM nv                                                       Exercise Diary              Prone hip flexion stretch 20"x4            Sci fit x10'            Step ups nv            Lat step ups nv            Side stepping with TB nv            Mini squats nv            Heel raises nv            bridges nv            LAQ nv            SLR flex nv            s/l hip abd nv            clamshells nv Modalities

## 2018-05-14 ENCOUNTER — OFFICE VISIT (OUTPATIENT)
Dept: PHYSICAL THERAPY | Facility: MEDICAL CENTER | Age: 68
End: 2018-05-14
Payer: MEDICARE

## 2018-05-14 DIAGNOSIS — S72.142A CLOSED INTERTROCHANTERIC FRACTURE OF LEFT HIP, INITIAL ENCOUNTER (HCC): Primary | ICD-10-CM

## 2018-05-14 DIAGNOSIS — M25.552 LEFT HIP PAIN: ICD-10-CM

## 2018-05-14 PROCEDURE — 97110 THERAPEUTIC EXERCISES: CPT

## 2018-05-14 PROCEDURE — 97140 MANUAL THERAPY 1/> REGIONS: CPT

## 2018-05-14 NOTE — PROGRESS NOTES
Daily Note     Today's date: 2018  Patient name: Lucio Valero  : 1950  MRN: [id number]  Referring provider: Shahzad Ahn MD  Dx: No diagnosis found  Subjective: ***      Objective: See treatment diary below    PT Evaluation     Today's date: 2018  Patient name: Lucio Valero  : 1950  MRN: [id number]  Referring provider: Shahzad Ahn MD  Dx:   No diagnosis found  Assessment  Impairments: abnormal gait, abnormal or restricted ROM, impaired balance, impaired physical strength, lacks appropriate home exercise program and pain with function    Assessment details: Pt is an alert and oriented 68 yo female, referred to out-pt PT s/p L intertrochanteric fx from a fall on ice on 3/22/18  Pt underwent ORIF on 3/23/18  Pt is WBAT LLE  Pt had home therapy for 3 weeks, and denies any post-op complications  Pt's c/c is difficulty walking  Upon examination, pt demonstrates tissue tenderness, gait deficits, decreased L hip strength and ROM  Pt will benefit from skilled PT to address the deficits listed above  Thank you for your referral   Understanding of Dx/Px/POC: good and excellent   Prognosis: good    Goals  ST: Improved ROM by 25% in 4 weeks to assist with all functional activities  2: Improve strength by 1/2 grade in 4 weeks to assist with all functional activities  LT: Improved ROM to WFL's in 4-6 weeks to assist with all functional activities  2:  Increase strength to WFL's in 4-6 weeks to assist with all functional activities        Plan  Patient would benefit from: skilled PT  Planned modality interventions: cryotherapy  Planned therapy interventions: manual therapy, neuromuscular re-education, therapeutic activities, therapeutic exercise, home exercise program and therapeutic training  Frequency: 2x week  Duration in visits: 12  Duration in weeks: 6  Treatment plan discussed with: patient        Subjective Evaluation    History of Present Illness  Date of onset: 3/22/2018  Date of surgery: 3/23/2018  Mechanism of injury: Fell, and underwent L hip ORIF  Quality of life: excellent    Pain  Current pain ratin  At best pain ratin  At worst pain ratin    Social Support  Steps to enter house: yes  1  Stairs in house: yes   1  Lives in: one-story house    Treatments  Current treatment: physical therapy  Patient Goals  Patient goals for therapy: decreased pain, improved balance, increased motion, increased strength and independence with ADLs/IADLs          Objective     Observations     Additional Observation Details  3 incision sites well healed, mild warmth present  (-)Vick's    Ambulation:  Pt amb ind with SPC, slowed gamal, decreased step length and stance time L LE    Palpation     Additional Palpation Details  Tenderness surrounding prox incision site only      Neurological Testing     Sensation     Hip   Left Hip   Intact: light touch    Right Hip   Intact: light touch    Additional Neurological Details  Pt denies numbness into b/l LE's/    Active Range of Motion   Left Hip   Flexion: 75 degrees   Extension: -5 degrees   Abduction: 15 degrees   External rotation (90/90): 15 degrees   Internal rotation (90/90): 15 degrees     Passive Range of Motion   Left Hip   Flexion: 80 degrees   Extension: -3 degrees   Abduction: 20 degrees     Strength/Myotome Testing     Left Hip   Planes of Motion   Flexion: 3+  Extension: 3+  Abduction: 3  Adduction: 3+  External rotation: 3  Internal rotation: 3+          Precautions: HTN    Daily Treatment Diary     Manual              MFR L lat hip nv            PROM nv                                                       Exercise Diary              Prone hip flexion stretch 20"x4            Sci fit x10'            Step ups nv            Lat step ups nv            Side stepping with TB nv            Mini squats nv            Heel raises nv            bridges nv            LAQ nv            SLR flex nv            s/l hip abd nv            kiana baker                                                                                                                        Modalities                                                             Assessment: Tolerated treatment {Tolerated treatment :[id number]}   Patient {assessment:[id number]}      Plan: {PLAN:[id number]}

## 2018-05-14 NOTE — PROGRESS NOTES
Daily Note     Today's date: 2018  Patient name: Lucio Valero  : 1950  MRN: [id number]  Referring provider: Shahzad Ahn MD  Dx:   Encounter Diagnosis     ICD-10-CM    1  Closed intertrochanteric fracture of left hip, initial encounter ([facility] [address] ) S72 142A    2  Left hip pain M25 552                   Subjective:Pt denies pain in hip today  Objective: See treatment diary below        Precautions: HTN    Daily Treatment Diary     Manual             MFR L lat hip nv 5 mins           PROM nv 10 mins                                                      Exercise Diary             Prone hip flexor stretch 20"x4 NV           Sci fit x10' 10'           Step ups nv 4 in x10           Lat step ups nv 4 in x10           Side stepping with TB nv RTB x 4 laps            Mini squats nv 20x           Heel raises nv 20x           bridges nv 3 sec 2x10           LAQ nv 5 sec x20           SLR flex nv 10x           s/l hip abd nv 10x           clamshells nv 3 sec x20                                                                                                                       Modalities                                                           Assessment: Tolerated treatment well  Patient demonstrated fatigue post treatment and would benefit from continued PT  Pt had poor tolerance to MRF over lateral hip due to sensitivity over incision sites  Performed with improved tolerance to gluteal region and distal ITB  No complaints of pain offered throughout tx  Pt reports she has been amb around the house w/o cane  Plan: Continue per plan of care

## 2018-05-17 ENCOUNTER — OFFICE VISIT (OUTPATIENT)
Dept: FAMILY MEDICINE CLINIC | Facility: CLINIC | Age: 68
End: 2018-05-17
Payer: MEDICARE

## 2018-05-17 VITALS
SYSTOLIC BLOOD PRESSURE: 158 MMHG | TEMPERATURE: 98.5 F | WEIGHT: 168.6 LBS | DIASTOLIC BLOOD PRESSURE: 92 MMHG | BODY MASS INDEX: 29.88 KG/M2 | HEIGHT: 63 IN | OXYGEN SATURATION: 98 % | RESPIRATION RATE: 16 BRPM | HEART RATE: 76 BPM

## 2018-05-17 DIAGNOSIS — I10 ESSENTIAL HYPERTENSION: Primary | ICD-10-CM

## 2018-05-17 PROBLEM — R50.9 FEVER: Status: RESOLVED | Noted: 2018-03-26 | Resolved: 2018-05-17

## 2018-05-17 PROBLEM — Z01.818 PREOPERATIVE CLEARANCE: Status: RESOLVED | Noted: 2018-03-22 | Resolved: 2018-05-17

## 2018-05-17 PROBLEM — S72.142A CLOSED INTERTROCHANTERIC FRACTURE OF LEFT HIP, INITIAL ENCOUNTER (HCC): Status: RESOLVED | Noted: 2018-03-22 | Resolved: 2018-05-17

## 2018-05-17 PROCEDURE — 99214 OFFICE O/P EST MOD 30 MIN: CPT | Performed by: FAMILY MEDICINE

## 2018-05-17 RX ORDER — AMLODIPINE BESYLATE 10 MG/1
10 TABLET ORAL DAILY
Qty: 90 TABLET | Refills: 1 | Status: SHIPPED | OUTPATIENT
Start: 2018-05-17 | End: 2018-09-24 | Stop reason: SDUPTHER

## 2018-05-17 RX ORDER — LOSARTAN POTASSIUM 100 MG/1
50 TABLET ORAL DAILY
COMMUNITY
End: 2018-09-12 | Stop reason: ALTCHOICE

## 2018-05-17 RX ORDER — LOSARTAN POTASSIUM AND HYDROCHLOROTHIAZIDE 12.5; 5 MG/1; MG/1
1 TABLET ORAL DAILY
COMMUNITY
End: 2018-09-12 | Stop reason: SDUPTHER

## 2018-05-17 NOTE — PROGRESS NOTES
Assessment/Plan:         Diagnoses and all orders for this visit:    Essential hypertension  -     amLODIPine (NORVASC) 10 mg tablet; Take 1 tablet (10 mg total) by mouth daily  -     CBC and differential; Future  -     Comprehensive metabolic panel; Future  -     Lipid panel; Future  -     TSH, 3rd generation; Future    Other orders  -     losartan-hydrochlorothiazide (HYZAAR) 50-12 5 mg per tablet; Take 1 tablet by mouth daily  -     losartan (COZAAR) 100 MG tablet; Take 50 mg by mouth daily          Subjective:      Patient ID: Cornelio Varela is a 69 y o  female  Here to f/u BP  Had been in the hosp and then rehab after a fall in late March  Since getting out of the hosp, BP has been running high  Losartan dose was changed from 50/12 5 with HCTZ to plain losartan 100mg, but pt doesn't know why  She has plenty of the 50/12 5 at home  Creatinine was fine in March, no complaints  Potassium was normal range  Pt urinating normally  Normal diet and fluids  NO CP/SOB, min ankle edema, more L sided than R  The following portions of the patient's history were reviewed and updated as appropriate: allergies, current medications, past family history, past medical history, past social history, past surgical history and problem list     Review of Systems      Objective:      /92 (BP Location: Left arm, Patient Position: Sitting, Cuff Size: Standard)   Pulse 76   Temp 98 5 °F (36 9 °C)   Resp 16   Ht 5' 3" (1 6 m)   Wt 76 5 kg (168 lb 9 6 oz)   SpO2 98%   BMI 29 87 kg/m²          Physical Exam   Constitutional: She is oriented to person, place, and time  She appears well-developed and well-nourished  Cardiovascular: Normal rate, regular rhythm and normal heart sounds  Pulmonary/Chest: Effort normal and breath sounds normal    Musculoskeletal: She exhibits edema (min ankle edema L>R)  Neurological: She is alert and oriented to person, place, and time  Skin: Skin is warm     Psychiatric: She has a normal mood and affect   Her behavior is normal  Judgment and thought content normal

## 2018-05-18 ENCOUNTER — OFFICE VISIT (OUTPATIENT)
Dept: PHYSICAL THERAPY | Facility: MEDICAL CENTER | Age: 68
End: 2018-05-18
Payer: MEDICARE

## 2018-05-18 DIAGNOSIS — S72.142A CLOSED INTERTROCHANTERIC FRACTURE OF LEFT HIP, INITIAL ENCOUNTER (HCC): Primary | ICD-10-CM

## 2018-05-18 DIAGNOSIS — M25.552 LEFT HIP PAIN: ICD-10-CM

## 2018-05-18 PROCEDURE — 97110 THERAPEUTIC EXERCISES: CPT | Performed by: PHYSICAL THERAPIST

## 2018-05-18 PROCEDURE — 97140 MANUAL THERAPY 1/> REGIONS: CPT | Performed by: PHYSICAL THERAPIST

## 2018-05-18 PROCEDURE — 97112 NEUROMUSCULAR REEDUCATION: CPT | Performed by: PHYSICAL THERAPIST

## 2018-05-18 NOTE — PROGRESS NOTES
Daily Note     Today's date: 2018  Patient name: Cheryl Cid  : 1950  MRN: [id number]  Referring provider: Pantera Akbar MD  Dx:   Encounter Diagnosis     ICD-10-CM    1  Closed intertrochanteric fracture of left hip, initial encounter ([facility] [address] ) S72 142A    2  Left hip pain M25 552                   Subjective: Pt states having fatigue in L hip due to doing a lot of walking this morning and states already performing her HEP  Objective: See treatment diary below        Precautions: HTN    Daily Treatment Diary , group format from 11:15-11:28pm     Manual            MFR L lat hip nv 5 mins NP          PROM nv 10 mins x10'                                                     Exercise Diary            Prone hip flexor stretch 20"x4 NV 20"x4          Sci fit x10' 10' 10'          Step ups nv 4 in x10 6"x20          Lat step ups nv 4 in x10 6"x20          Side stepping with TB nv RTB x 4 laps  RTB x4laps          Mini squats nv 20x x20          Heel raises nv 20x x20          bridges nv 3 sec 2x10 5"x20          LAQ nv 5 sec x20 5"x20          SLR flex nv 10x           s/l hip abd nv 10x           clamshells nv 3 sec x20 5"x20          Hip add iso   5x20                                                                                                         Modalities                                                           Assessment: Tolerated treatment well  Patient demonstrated fatigue post treatment and would benefit from continued PT  Progress LE strengthening with wts as able  Plan: Continue per plan of care

## 2018-05-21 ENCOUNTER — OFFICE VISIT (OUTPATIENT)
Dept: PHYSICAL THERAPY | Facility: MEDICAL CENTER | Age: 68
End: 2018-05-21
Payer: MEDICARE

## 2018-05-21 DIAGNOSIS — S72.142A CLOSED INTERTROCHANTERIC FRACTURE OF LEFT HIP, INITIAL ENCOUNTER (HCC): Primary | ICD-10-CM

## 2018-05-21 DIAGNOSIS — M25.552 LEFT HIP PAIN: ICD-10-CM

## 2018-05-21 PROCEDURE — 97140 MANUAL THERAPY 1/> REGIONS: CPT | Performed by: PHYSICAL THERAPIST

## 2018-05-21 PROCEDURE — 97110 THERAPEUTIC EXERCISES: CPT | Performed by: PHYSICAL THERAPIST

## 2018-05-21 PROCEDURE — 97112 NEUROMUSCULAR REEDUCATION: CPT | Performed by: PHYSICAL THERAPIST

## 2018-05-21 NOTE — PROGRESS NOTES
Daily Note     Today's date: 2018  Patient name: Aj Ards  : 1950  MRN: [id number]  Referring provider: Sylvia Lester MD  Dx:   Encounter Diagnosis     ICD-10-CM    1  Closed intertrochanteric fracture of left hip, initial encounter ([facility] [address] ) S72 142A    2  Left hip pain M25 552                   Subjective: Pt reports hip is feeling pretty good today  Objective: See treatment diary below        Precautions: HTN    Daily Treatment Diary    Manual           MFR L lat hip nv 5 mins NP NP         PROM nv 10 mins x10' 10'                                                    Exercise Diary           Prone hip flexor stretch 20"x4 NV 20"x4 20" 4x         Sci fit x10' 10' 10' 10'         Step ups nv 4 in x10 6"x20 6" 20x         Lat step ups nv 4 in x10 6"x20 6" 20x         Side stepping with TB nv RTB x 4 laps  RTB x4laps RTB 4 laps         Mini squats nv 20x x20          Heel raises nv 20x x20 20x         bridges nv 3 sec 2x10 5"x20 5" 20x         LAQ nv 5 sec x20 5"x20 5" 20x         SLR flex nv 10x  10x         s/l hip abd nv 10x  10x         clamshells nv 3 sec x20 5"x20 5" 20x         Hip add iso   5x20 5" 20x                                                                                                        Modalities                                                           Assessment: Tolerated treatment well  Patient demonstrated fatigue post treatment and would benefit from continued PT  Progress NV if able  Plan: Continue per plan of care

## 2018-05-23 ENCOUNTER — OFFICE VISIT (OUTPATIENT)
Dept: PHYSICAL THERAPY | Facility: MEDICAL CENTER | Age: 68
End: 2018-05-23
Payer: MEDICARE

## 2018-05-23 DIAGNOSIS — S72.142A CLOSED INTERTROCHANTERIC FRACTURE OF LEFT HIP, INITIAL ENCOUNTER (HCC): Primary | ICD-10-CM

## 2018-05-23 DIAGNOSIS — M25.552 LEFT HIP PAIN: ICD-10-CM

## 2018-05-23 PROCEDURE — 97110 THERAPEUTIC EXERCISES: CPT | Performed by: PHYSICAL THERAPIST

## 2018-05-23 PROCEDURE — 97112 NEUROMUSCULAR REEDUCATION: CPT | Performed by: PHYSICAL THERAPIST

## 2018-05-23 PROCEDURE — 97140 MANUAL THERAPY 1/> REGIONS: CPT | Performed by: PHYSICAL THERAPIST

## 2018-05-23 PROCEDURE — G8979 MOBILITY GOAL STATUS: HCPCS | Performed by: PHYSICAL THERAPIST

## 2018-05-23 PROCEDURE — G8978 MOBILITY CURRENT STATUS: HCPCS | Performed by: PHYSICAL THERAPIST

## 2018-05-23 NOTE — PROGRESS NOTES
Daily Note     Today's date: 2018  Patient name: Marvin Malin  : 1950  MRN: [id number]  Referring provider: Mitul Prabhakar MD  Dx:   Encounter Diagnosis     ICD-10-CM    1  Closed intertrochanteric fracture of left hip, initial encounter ([facility] [address] ) S72 142A    2  Left hip pain M25 552                   Subjective: Pt states having increased hip soreness today, possibly from damp rainy weather  Objective: See treatment diary below        Precautions: HTN    Daily Treatment Diary    Manual          MFR L lat hip nv 5 mins NP NP         PROM nv 10 mins x10' 10' x10'                                                   Exercise Diary          Prone hip flexor stretch 20"x4 NV 20"x4 20" 4x 20"x4        Sci fit x10' 10' 10' 10' x10'        Step ups nv 4 in x10 6"x20 6" 20x 6"x20        Lat step ups nv 4 in x10 6"x20 6" 20x 6"x20        Side stepping with TB nv RTB x 4 laps  RTB x4laps RTB 4 laps RTB x4laps        Mini squats nv 20x x20  x20        Heel raises nv 20x x20 20x x20        bridges nv 3 sec 2x10 5"x20 5" 20x 5"x20        LAQ nv 5 sec x20 5"x20 5" 20x 5"x20        SLR flex nv 10x  10x 2x10        s/l hip abd nv 10x  10x 2x10        clamshells nv 3 sec x20 5"x20 5" 20x 5"x20        Hip add iso   5x20 5" 20x 5"x20                                                                                                       Modalities                                                           Assessment: Tolerated treatment well  Patient demonstrated fatigue post treatment and would benefit from continued PT  Added wts today as charted, which pt states feeling challenged  Plan: Continue per plan of care

## 2018-05-30 ENCOUNTER — OFFICE VISIT (OUTPATIENT)
Dept: PHYSICAL THERAPY | Facility: MEDICAL CENTER | Age: 68
End: 2018-05-30
Payer: MEDICARE

## 2018-05-30 ENCOUNTER — APPOINTMENT (OUTPATIENT)
Dept: LAB | Facility: MEDICAL CENTER | Age: 68
End: 2018-05-30
Payer: MEDICARE

## 2018-05-30 DIAGNOSIS — S72.142A CLOSED INTERTROCHANTERIC FRACTURE OF LEFT HIP, INITIAL ENCOUNTER (HCC): ICD-10-CM

## 2018-05-30 DIAGNOSIS — M25.552 LEFT HIP PAIN: Primary | ICD-10-CM

## 2018-05-30 DIAGNOSIS — I10 ESSENTIAL HYPERTENSION: ICD-10-CM

## 2018-05-30 LAB
ALBUMIN SERPL BCP-MCNC: 4.2 G/DL (ref 3.5–5)
ALP SERPL-CCNC: 142 U/L (ref 46–116)
ALT SERPL W P-5'-P-CCNC: 18 U/L (ref 12–78)
ANION GAP SERPL CALCULATED.3IONS-SCNC: 7 MMOL/L (ref 4–13)
AST SERPL W P-5'-P-CCNC: 12 U/L (ref 5–45)
BASOPHILS # BLD AUTO: 0.05 THOUSANDS/ΜL (ref 0–0.1)
BASOPHILS NFR BLD AUTO: 1 % (ref 0–1)
BILIRUB SERPL-MCNC: 0.53 MG/DL (ref 0.2–1)
BUN SERPL-MCNC: 26 MG/DL (ref 5–25)
CALCIUM SERPL-MCNC: 9.3 MG/DL (ref 8.3–10.1)
CHLORIDE SERPL-SCNC: 101 MMOL/L (ref 100–108)
CHOLEST SERPL-MCNC: 158 MG/DL (ref 50–200)
CO2 SERPL-SCNC: 31 MMOL/L (ref 21–32)
CREAT SERPL-MCNC: 0.79 MG/DL (ref 0.6–1.3)
EOSINOPHIL # BLD AUTO: 0.21 THOUSAND/ΜL (ref 0–0.61)
EOSINOPHIL NFR BLD AUTO: 4 % (ref 0–6)
ERYTHROCYTE [DISTWIDTH] IN BLOOD BY AUTOMATED COUNT: 12.6 % (ref 11.6–15.1)
GFR SERPL CREATININE-BSD FRML MDRD: 77 ML/MIN/1.73SQ M
GLUCOSE P FAST SERPL-MCNC: 82 MG/DL (ref 65–99)
HCT VFR BLD AUTO: 45.2 % (ref 34.8–46.1)
HDLC SERPL-MCNC: 58 MG/DL (ref 40–60)
HGB BLD-MCNC: 14.4 G/DL (ref 11.5–15.4)
IMM GRANULOCYTES # BLD AUTO: 0.01 THOUSAND/UL (ref 0–0.2)
IMM GRANULOCYTES NFR BLD AUTO: 0 % (ref 0–2)
LDLC SERPL CALC-MCNC: 79 MG/DL (ref 0–100)
LYMPHOCYTES # BLD AUTO: 2.25 THOUSANDS/ΜL (ref 0.6–4.47)
LYMPHOCYTES NFR BLD AUTO: 40 % (ref 14–44)
MCH RBC QN AUTO: 31 PG (ref 26.8–34.3)
MCHC RBC AUTO-ENTMCNC: 31.9 G/DL (ref 31.4–37.4)
MCV RBC AUTO: 97 FL (ref 82–98)
MONOCYTES # BLD AUTO: 0.52 THOUSAND/ΜL (ref 0.17–1.22)
MONOCYTES NFR BLD AUTO: 9 % (ref 4–12)
NEUTROPHILS # BLD AUTO: 2.53 THOUSANDS/ΜL (ref 1.85–7.62)
NEUTS SEG NFR BLD AUTO: 46 % (ref 43–75)
NONHDLC SERPL-MCNC: 100 MG/DL
NRBC BLD AUTO-RTO: 0 /100 WBCS
PLATELET # BLD AUTO: 246 THOUSANDS/UL (ref 149–390)
PMV BLD AUTO: 11.2 FL (ref 8.9–12.7)
POTASSIUM SERPL-SCNC: 4 MMOL/L (ref 3.5–5.3)
PROT SERPL-MCNC: 7.2 G/DL (ref 6.4–8.2)
RBC # BLD AUTO: 4.65 MILLION/UL (ref 3.81–5.12)
SODIUM SERPL-SCNC: 139 MMOL/L (ref 136–145)
TRIGL SERPL-MCNC: 104 MG/DL
TSH SERPL DL<=0.05 MIU/L-ACNC: 3.07 UIU/ML (ref 0.36–3.74)
WBC # BLD AUTO: 5.57 THOUSAND/UL (ref 4.31–10.16)

## 2018-05-30 PROCEDURE — 36415 COLL VENOUS BLD VENIPUNCTURE: CPT

## 2018-05-30 PROCEDURE — 84443 ASSAY THYROID STIM HORMONE: CPT

## 2018-05-30 PROCEDURE — 80053 COMPREHEN METABOLIC PANEL: CPT

## 2018-05-30 PROCEDURE — 97110 THERAPEUTIC EXERCISES: CPT | Performed by: PHYSICAL THERAPIST

## 2018-05-30 PROCEDURE — 80061 LIPID PANEL: CPT

## 2018-05-30 PROCEDURE — 85025 COMPLETE CBC W/AUTO DIFF WBC: CPT

## 2018-05-30 PROCEDURE — 97140 MANUAL THERAPY 1/> REGIONS: CPT | Performed by: PHYSICAL THERAPIST

## 2018-05-30 PROCEDURE — 97112 NEUROMUSCULAR REEDUCATION: CPT | Performed by: PHYSICAL THERAPIST

## 2018-05-30 NOTE — PROGRESS NOTES
Daily Note     Today's date: 2018  Patient name: Linda Noland  : 1950  MRN: [id number]  Referring provider: Alfie Mohan MD  Dx:   Encounter Diagnosis     ICD-10-CM    1  Left hip pain M25 552    2  Closed intertrochanteric fracture of left hip, initial encounter ([facility] [address] ) S75 [id number]                   Subjective: Pt states she is able to amb for prolonged distances without use of SPC, and states min pain/discomfort during and post   Pt feels she is progressing well at this time  Observed mild/mod L lat trunk shift during gait  Objective: See treatment diary below        Precautions: HTN    Daily Treatment Diary    Manual         MFR L lat hip nv 5 mins NP NP         PROM nv 10 mins x10' 10' x10' x10'                                                  Exercise Diary         Prone hip flexor stretch 20"x4 NV 20"x4 20" 4x 20"x4 20"x4       Sci fit x10' 10' 10' 10' x10' x10'       Step ups nv 4 in x10 6"x20 6" 20x 6"x20 8"x20       Lat step ups nv 4 in x10 6"x20 6" 20x 6"x20 8"x20       Side stepping with TB nv RTB x 4 laps  RTB x4laps RTB 4 laps RTB x4laps GTB x4laps       Mini squats nv 20x x20  x20 x20       Heel raises nv 20x x20 20x x20 x20       bridges nv 3 sec 2x10 5"x20 5" 20x 5"x20 1 5#x20       LAQ nv 5 sec x20 5"x20 5" 20x 5"x20 1 5# x20       SLR flex nv 10x  10x 2x10 2x10       s/l hip abd nv 10x  10x 2x10 2x10       clamshells nv 3 sec x20 5"x20 5" 20x 5"x20 5"x20       Hip add iso   5x20 5" 20x 5"x20 5"x20       SAQ      1 5# 5"x20       TB hip abd      GTB 5x20                                                                                Assessment: Tolerated treatment well  Patient demonstrated fatigue post treatment and would benefit from continued PT  Increased step height and resistance with TB sidestepping  Plan: Continue per plan of care

## 2018-06-01 ENCOUNTER — OFFICE VISIT (OUTPATIENT)
Dept: PHYSICAL THERAPY | Facility: MEDICAL CENTER | Age: 68
End: 2018-06-01
Payer: MEDICARE

## 2018-06-01 DIAGNOSIS — S72.142A CLOSED INTERTROCHANTERIC FRACTURE OF LEFT HIP, INITIAL ENCOUNTER (HCC): ICD-10-CM

## 2018-06-01 DIAGNOSIS — M25.552 LEFT HIP PAIN: Primary | ICD-10-CM

## 2018-06-01 PROCEDURE — 97112 NEUROMUSCULAR REEDUCATION: CPT | Performed by: PHYSICAL THERAPIST

## 2018-06-01 PROCEDURE — 97140 MANUAL THERAPY 1/> REGIONS: CPT | Performed by: PHYSICAL THERAPIST

## 2018-06-01 NOTE — PROGRESS NOTES
Daily Note     Today's date: 2018  Patient name: Joana Ortiz  : 1950  MRN: [id number]  Referring provider: Sarthak Richard MD  Dx:   Encounter Diagnosis     ICD-10-CM    1  Left hip pain M25 552    2  Closed intertrochanteric fracture of left hip, initial encounter ([facility] [address] ) S76 437A                   Subjective: Pt states she had mod increased lat hip soreness from progressing ex last visit, but states the more she walked the better it felt  Objective: See treatment diary below, group format from 8:45-8:55am        Precautions: HTN    Daily Treatment Diary    Manual        MFR L lat hip nv 5 mins NP NP         PROM nv 10 mins x10' 10' x10' x10' x10'                                                 Exercise Diary   61      Prone hip flexor stretch 20"x4 NV 20"x4 20" 4x 20"x4 20"x4 20"x4      Sci fit x10' 10' 10' 10' x10' x10' x10'      Step ups nv 4 in x10 6"x20 6" 20x 6"x20 8"x20 8"x20      Lat step ups nv 4 in x10 6"x20 6" 20x 6"x20 8"x20 8"x20      Side stepping with TB nv RTB x 4 laps  RTB x4laps RTB 4 laps RTB x4laps GTB x4laps GTB x4laps      Mini squats nv 20x x20  x20 x20 x20      Heel raises nv 20x x20 20x x20 x20 x20      bridges nv 3 sec 2x10 5"x20 5" 20x 5"x20 1 5#x20 1 5# x20      LAQ nv 5 sec x20 5"x20 5" 20x 5"x20 1 5# x20 1 5# x20      SLR flex nv 10x  10x 2x10 2x10 2x10      s/l hip abd nv 10x  10x 2x10 2x10 2x10      clamshells nv 3 sec x20 5"x20 5" 20x 5"x20 5"x20 5"x20      Hip add iso   5x20 5" 20x 5"x20 5"x20 5"x20      SAQ      1 5# 5"x20 1 5# 5"x20      TB hip abd      GTB 5x20 GTB 5"x20                                                                               Assessment: Tolerated treatment well  Patient demonstrated fatigue post treatment and would benefit from continued PT  Decreased muscle soreness and difficulty with TE reported today  Plan: Continue per plan of care

## 2018-06-04 ENCOUNTER — EVALUATION (OUTPATIENT)
Dept: PHYSICAL THERAPY | Facility: MEDICAL CENTER | Age: 68
End: 2018-06-04
Payer: MEDICARE

## 2018-06-04 DIAGNOSIS — M25.552 LEFT HIP PAIN: Primary | ICD-10-CM

## 2018-06-04 DIAGNOSIS — S72.142A CLOSED INTERTROCHANTERIC FRACTURE OF LEFT HIP, INITIAL ENCOUNTER (HCC): ICD-10-CM

## 2018-06-04 PROCEDURE — 97112 NEUROMUSCULAR REEDUCATION: CPT | Performed by: PHYSICAL THERAPIST

## 2018-06-04 PROCEDURE — 97110 THERAPEUTIC EXERCISES: CPT | Performed by: PHYSICAL THERAPIST

## 2018-06-04 PROCEDURE — 97140 MANUAL THERAPY 1/> REGIONS: CPT | Performed by: PHYSICAL THERAPIST

## 2018-06-04 NOTE — PROGRESS NOTES
PT Evaluation     Today's date: 2018  Patient name: Fabio Casillas  : 1950  MRN: [id number]  Referring provider: Krystal Renteria MD  Dx:   Encounter Diagnosis     ICD-10-CM    1  Left hip pain M25 552    2  Closed intertrochanteric fracture of left hip, initial encounter [facility]) [id number]                   Assessment  Impairments: abnormal gait, abnormal or restricted ROM, impaired balance, impaired physical strength and pain with function    Assessment details: Pt has attended 8 visits of skilled PT over the past 4 weeks  Pt states she feels steady improvements in gait and L hip strength since start of tx   Pt's c/c's cont to be limited gait with use of SPC and decreased flexibility with bending to don socks and shoes  Upon reassessment, pt demonstrates decreased tissue tenderness, gait deficits persist, and improved L hip strength and ROM  Pt will benefit from skilled PT for 2 more weeks to address the deficits listed above  Thank you for your referral   Understanding of Dx/Px/POC: excellent  Goals  ST: Improved ROM by 25% in 4 weeks to assist with all functional activities-partially met as of   2: Improve strength by 1/2 grade in 4 weeks to assist with all functional activities-partially met as of   LT: Improved ROM to WFL's in 4-6 weeks to assist with all functional activities  2:  Increase strength to WFL's in 4-6 weeks to assist with all functional activities        Plan  Patient would benefit from: skilled PT  Planned modality interventions: cryotherapy  Planned therapy interventions: manual therapy, neuromuscular re-education, therapeutic activities, therapeutic exercise, home exercise program and therapeutic training  Frequency: 2x week  Duration in visits: 4  Duration in weeks: 2  Treatment plan discussed with: patient        Subjective Evaluation    History of Present Illness  Date of onset: 3/22/2018  Date of surgery: 3/23/2018  Mechanism of injury: Jimmey Claw, and underwent L hip ORIF  Quality of life: excellent    Pain  Current pain ratin  At best pain ratin  At worst pain ratin  Location: L lat hip    Social Support  Steps to enter house: yes  1  Stairs in house: yes   1  Lives in: one-story house    Treatments  Current treatment: physical therapy  Patient Goals  Patient goals for therapy: decreased pain, improved balance, increased motion, increased strength and independence with ADLs/IADLs          Objective     Observations     Additional Observation Details  3 incision sites well healed    Ambulation:  Pt amb ind without SPC, improved gamal and step length and stance time L LE   Mild L lat trunk shift persists  Palpation     Additional Palpation Details  Tenderness surrounding prox incision site only  -persists as of     Neurological Testing     Sensation     Hip   Left Hip   Intact: light touch    Right Hip   Intact: light touch    Additional Neurological Details  Pt denies numbness into b/l LE's/    Active Range of Motion   Left Hip   Flexion: 110 degrees   Extension: 0 degrees   Abduction: 18 degrees   External rotation (90/90): 18 degrees   Internal rotation (90/90): 18 degrees     Passive Range of Motion   Left Hip   Flexion: 115 degrees   Extension: 5 degrees   Abduction: 20 degrees     Strength/Myotome Testing     Left Hip   Planes of Motion   Flexion: 4  Extension: 4-  Abduction: 4-  Adduction: 4  External rotation: 4-  Internal rotation: 4-          Precautions: HTN    Daily Treatment Diary   Manual    6       MFR L lat hip nv 5 mins NP NP        x5'       PROM nv 10 mins x10' 10' x10' x10' x10'  x10'                                                                                     Exercise Diary    6/       Prone hip flexor stretch 20"x4 NV 20"x4 20" 4x 20"x4 20"x4 20"x4  20"x4       Sci fit x10' 10' 10' 10' x10' x10' x10'  x10'       Step ups nv 4 in x10 6"x20 6" 20x 6"x20 8"x20 8"x20  8"x20       Lat step ups nv 4 in x10 6"x20 6" 20x 6"x20 8"x20 8"x20  8"x20       Side stepping with TB nv RTB x 4 laps  RTB x4laps RTB 4 laps RTB x4laps GTB x4laps GTB x4laps  GTB x4laps       Mini squats nv 20x x20   x20 x20 x20  x20       Heel raises nv 20x x20 20x x20 x20 x20  x20       bridges nv 3 sec 2x10 5"x20 5" 20x 5"x20 x20  x20  5"x20       LAQ nv 5 sec x20 5"x20 5" 20x 5"x20 1 5# x20 1 5# x20  2# 2x10       SLR flex nv 10x   10x 2x10 2x10 2x10  2# 2x10       s/l hip abd nv 10x   10x 2x10 2x10 2x10  2# 2x10       clamshells nv 3 sec x20 5"x20 5" 20x 5"x20 5"x20 5"x20  GTB 5"x20       Hip add iso     5x20 5" 20x 5"x20 5"x20 5"x20  5"x20       SAQ           1 5# 5"x20 1 5# 5"x20  2# 5"x20       TB hip abd           GTB 5x20 GTB 5"x20  GTB 5"x20

## 2018-06-06 ENCOUNTER — OFFICE VISIT (OUTPATIENT)
Dept: PHYSICAL THERAPY | Facility: MEDICAL CENTER | Age: 68
End: 2018-06-06
Payer: MEDICARE

## 2018-06-06 DIAGNOSIS — M25.552 LEFT HIP PAIN: Primary | ICD-10-CM

## 2018-06-06 DIAGNOSIS — S72.142A CLOSED INTERTROCHANTERIC FRACTURE OF LEFT HIP, INITIAL ENCOUNTER (HCC): ICD-10-CM

## 2018-06-06 PROCEDURE — 97140 MANUAL THERAPY 1/> REGIONS: CPT | Performed by: PHYSICAL THERAPIST

## 2018-06-06 PROCEDURE — 97112 NEUROMUSCULAR REEDUCATION: CPT | Performed by: PHYSICAL THERAPIST

## 2018-06-06 PROCEDURE — 97116 GAIT TRAINING THERAPY: CPT | Performed by: PHYSICAL THERAPIST

## 2018-06-06 PROCEDURE — 97110 THERAPEUTIC EXERCISES: CPT | Performed by: PHYSICAL THERAPIST

## 2018-06-06 NOTE — PROGRESS NOTES
Daily Note     Today's date: 2018  Patient name: Pineda Keith  : 1950  MRN: [id number]  Referring provider: Naldo Hobbs MD  Dx:   Encounter Diagnosis     ICD-10-CM    1  Left hip pain M25 552    2  Closed intertrochanteric fracture of left hip, initial encounter ([facility] [address] ) U82 184L                   Subjective: Pt states no new complaints upon presentation  Pt states she feels slight swelling in L hip which causes increase in stiffness  Objective: See treatment diary below        Precautions: HTN    Daily Treatment Diary    Manual   66     MFR L lat hip nv 5 mins NP NP         PROM nv 10 mins x10' 10' x10' x10' x10' x10'                                                Exercise Diary   66     Prone hip flexor stretch 20"x4 NV 20"x4 20" 4x 20"x4 20"x4 20"x4 20"x4     Sci fit x10' 10' 10' 10' x10' x10' x10' x10'     Step ups nv 4 in x10 6"x20 6" 20x 6"x20 8"x20 8"x20      Lat step ups nv 4 in x10 6"x20 6" 20x 6"x20 8"x20 8"x20      Side stepping with TB nv RTB x 4 laps  RTB x4laps RTB 4 laps RTB x4laps GTB x4laps GTB x4laps GTB x4laps     Mini squats nv 20x x20  x20 x20 x20 NP     Heel raises nv 20x x20 20x x20 x20 x20 NP     bridges nv 3 sec 2x10 5"x20 5" 20x 5"x20 1 5#x20 1 5# x20 2# 5"x20     LAQ nv 5 sec x20 5"x20 5" 20x 5"x20 1 5# x20 1 5# x20 2# x20     SLR flex nv 10x  10x 2x10 2x10 2x10 2# x20     s/l hip abd nv 10x  10x 2x10 2x10 2x10 2# x20     clamshells nv 3 sec x20 5"x20 5" 20x 5"x20 5"x20 5"x20 GTB 5"x20     Hip add iso   5x20 5" 20x 5"x20 5"x20 5"x20 5"x20     SAQ      1 5# 5"x20 1 5# 5"x20 NP     TB hip abd      GTB 5x20 GTB 5"x20 GTB 5"x20     Hamstring stretch on step        20"x4     ITB stretch        20"x4     Trunk flexion stretch        10"x10                                       Assessment: Tolerated treatment well  Patient demonstrated fatigue post treatment and would benefit from continued PT  Performed gait training without SPC, observed no trunk rot and arm swing possibly due to focusing on what her hip and knee were doing  Once cued, pt's gamal immed increased and developed more normalized gait  Performed more flexibility ex due to stiffness and swelling observed in L hip  Plan: Continue per plan of care

## 2018-06-11 ENCOUNTER — OFFICE VISIT (OUTPATIENT)
Dept: PHYSICAL THERAPY | Facility: MEDICAL CENTER | Age: 68
End: 2018-06-11
Payer: MEDICARE

## 2018-06-11 DIAGNOSIS — S72.142A CLOSED INTERTROCHANTERIC FRACTURE OF LEFT HIP, INITIAL ENCOUNTER (HCC): ICD-10-CM

## 2018-06-11 DIAGNOSIS — M25.552 LEFT HIP PAIN: Primary | ICD-10-CM

## 2018-06-11 PROCEDURE — 97112 NEUROMUSCULAR REEDUCATION: CPT | Performed by: PHYSICAL THERAPIST

## 2018-06-11 PROCEDURE — 97140 MANUAL THERAPY 1/> REGIONS: CPT | Performed by: PHYSICAL THERAPIST

## 2018-06-11 PROCEDURE — 97110 THERAPEUTIC EXERCISES: CPT | Performed by: PHYSICAL THERAPIST

## 2018-06-11 NOTE — PROGRESS NOTES
Daily Note     Today's date: 2018  Patient name: Joana Ortiz  : 1950  MRN: [id number]  Referring provider: Sarthak Richard MD  Dx:   Encounter Diagnosis     ICD-10-CM    1  Left hip pain M25 552    2  Closed intertrochanteric fracture of left hip, initial encounter [facility]) S79 [id number]                   Subjective: Pt states she went to [facility] on Friday, and had t walk far distance from door to [facility]  Pt states she brought her [facility] but felt like it went well  Pt states c/c of increased stiffness the following day      Objective: See treatment diary below        Precautions: HTN    Daily Treatment Diary    Manual      MFR L lat hip nv 5 mins NP NP         PROM nv 10 mins x10' 10' x10' x10' x10' x10' x10'                                               Exercise Diary      Prone hip flexor stretch 20"x4 NV 20"x4 20" 4x 20"x4 20"x4 20"x4 20"x4 20"x4    Sci fit x10' 10' 10' 10' x10' x10' x10' x10' x10'    Step ups nv 4 in x10 6"x20 6" 20x 6"x20 8"x20 8"x20  8"x20    Lat step ups nv 4 in x10 6"x20 6" 20x 6"x20 8"x20 8"x20  8"x20    Side stepping with TB nv RTB x 4 laps  RTB x4laps RTB 4 laps RTB x4laps GTB x4laps GTB x4laps GTB x4laps GTB x4laps    Mini squats nv 20x x20  x20 x20 x20 NP NP    Heel raises nv 20x x20 20x x20 x20 x20 NP NP    bridges nv 3 sec 2x10 5"x20 5" 20x 5"x20 1 5#x20 1 5# x20 2# 5"x20 2# 5"x20    LAQ nv 5 sec x20 5"x20 5" 20x 5"x20 1 5# x20 1 5# x20 2# x20 2# x20    SLR flex nv 10x  10x 2x10 2x10 2x10 2# x20 2# x20    s/l hip abd nv 10x  10x 2x10 2x10 2x10 2# x20 2# x20    clamshells nv 3 sec x20 5"x20 5" 20x 5"x20 5"x20 5"x20 GTB 5"x20 GTB 5"x20    Hip add iso   5x20 5" 20x 5"x20 5"x20 5"x20 5"x20 5"x20    SAQ      1 5# 5"x20 1 5# 5"x20 NP NP    TB hip abd      GTB 5x20 GTB 5"x20 GTB 5"x20 GTB 5"x20    Hamstring stretch on step        20"x4 20"x4    ITB stretch        20"x4 20"x4 Trunk flexion stretch        10"x10 10"x10                                      Assessment: Tolerated treatment well  Patient demonstrated fatigue post treatment and would benefit from continued PT  Plan: Continue per plan of care  Will D/C to ind HEP nv

## 2018-06-13 ENCOUNTER — OFFICE VISIT (OUTPATIENT)
Dept: PHYSICAL THERAPY | Facility: MEDICAL CENTER | Age: 68
End: 2018-06-13
Payer: MEDICARE

## 2018-06-13 DIAGNOSIS — S72.142A CLOSED INTERTROCHANTERIC FRACTURE OF LEFT HIP, INITIAL ENCOUNTER (HCC): ICD-10-CM

## 2018-06-13 DIAGNOSIS — M25.552 LEFT HIP PAIN: Primary | ICD-10-CM

## 2018-06-13 PROCEDURE — G8978 MOBILITY CURRENT STATUS: HCPCS | Performed by: PHYSICAL THERAPIST

## 2018-06-13 PROCEDURE — G8979 MOBILITY GOAL STATUS: HCPCS | Performed by: PHYSICAL THERAPIST

## 2018-06-13 PROCEDURE — 97112 NEUROMUSCULAR REEDUCATION: CPT | Performed by: PHYSICAL THERAPIST

## 2018-06-13 PROCEDURE — 97140 MANUAL THERAPY 1/> REGIONS: CPT | Performed by: PHYSICAL THERAPIST

## 2018-06-13 PROCEDURE — 97110 THERAPEUTIC EXERCISES: CPT | Performed by: PHYSICAL THERAPIST

## 2018-06-13 NOTE — PROGRESS NOTES
Daily Note     Today's date: 2018  Patient name: Misha Matias  : 1950  MRN: [id number]  Referring provider: Charley Ku MD  Dx:   Encounter Diagnosis     ICD-10-CM    1  Left hip pain M25 552    2  Closed intertrochanteric fracture of left hip, initial encounter ([facility] [address] ) J53 764K                   Subjective: Pt states she is in agreement with D/C POC after todays visit  Pt states c/c of stiffness at times, lenore after prolonged standing or sitting      Objective: See treatment diary below        Precautions: HTN    Daily Treatment Diary    Manual      MFR L lat hip nv 5 mins NP NP         PROM nv 10 mins x10' 10' x10' x10' x10' x10' x10'                                               Exercise Diary      Prone hip flexor stretch 20"x4 NV 20"x4 20" 4x 20"x4 20"x4 20"x4 20"x4 20"x4    Sci fit x10' 10' 10' 10' x10' x10' x10' x10' x10'    Step ups nv 4 in x10 6"x20 6" 20x 6"x20 8"x20 8"x20  8"x20    Lat step ups nv 4 in x10 6"x20 6" 20x 6"x20 8"x20 8"x20  8"x20    Side stepping with TB nv RTB x 4 laps  RTB x4laps RTB 4 laps RTB x4laps GTB x4laps GTB x4laps GTB x4laps GTB x4laps    Mini squats nv 20x x20  x20 x20 x20 NP NP    Heel raises nv 20x x20 20x x20 x20 x20 NP NP    bridges nv 3 sec 2x10 5"x20 5" 20x 5"x20 1 5#x20 1 5# x20 2# 5"x20 2# 5"x20    LAQ nv 5 sec x20 5"x20 5" 20x 5"x20 1 5# x20 1 5# x20 2# x20 2# x20    SLR flex nv 10x  10x 2x10 2x10 2x10 2# x20 2# x20    s/l hip abd nv 10x  10x 2x10 2x10 2x10 2# x20 2# x20    clamshells nv 3 sec x20 5"x20 5" 20x 5"x20 5"x20 5"x20 GTB 5"x20 GTB 5"x20    Hip add iso   5x20 5" 20x 5"x20 5"x20 5"x20 5"x20 5"x20    SAQ      1 5# 5"x20 1 5# 5"x20 NP NP    TB hip abd      GTB 5x20 GTB 5"x20 GTB 5"x20 GTB 5"x20    Hamstring stretch on step        20"x4 20"x4    ITB stretch        20"x4 20"x4    Trunk flexion stretch        10"x10 10"x10 Assessment: Tolerated treatment well  Patient is ind with HEP  Pt demonstrates good technqiue and aareness of all ex performed           Plan: D/C PT

## 2018-06-18 ENCOUNTER — APPOINTMENT (OUTPATIENT)
Dept: PHYSICAL THERAPY | Facility: MEDICAL CENTER | Age: 68
End: 2018-06-18
Payer: MEDICARE

## 2018-06-20 ENCOUNTER — APPOINTMENT (OUTPATIENT)
Dept: PHYSICAL THERAPY | Facility: MEDICAL CENTER | Age: 68
End: 2018-06-20
Payer: MEDICARE

## 2018-06-25 ENCOUNTER — APPOINTMENT (OUTPATIENT)
Dept: PHYSICAL THERAPY | Facility: MEDICAL CENTER | Age: 68
End: 2018-06-25
Payer: MEDICARE

## 2018-06-27 ENCOUNTER — APPOINTMENT (OUTPATIENT)
Dept: PHYSICAL THERAPY | Facility: MEDICAL CENTER | Age: 68
End: 2018-06-27
Payer: MEDICARE

## 2018-07-06 ENCOUNTER — OFFICE VISIT (OUTPATIENT)
Dept: OBGYN CLINIC | Facility: CLINIC | Age: 68
End: 2018-07-06
Payer: MEDICARE

## 2018-07-06 ENCOUNTER — APPOINTMENT (OUTPATIENT)
Dept: RADIOLOGY | Facility: CLINIC | Age: 68
End: 2018-07-06
Payer: MEDICARE

## 2018-07-06 VITALS
BODY MASS INDEX: 29.23 KG/M2 | WEIGHT: 165 LBS | DIASTOLIC BLOOD PRESSURE: 79 MMHG | HEART RATE: 72 BPM | SYSTOLIC BLOOD PRESSURE: 175 MMHG | HEIGHT: 63 IN

## 2018-07-06 DIAGNOSIS — S72.142A CLOSED INTERTROCHANTERIC FRACTURE OF LEFT HIP, INITIAL ENCOUNTER (HCC): Primary | ICD-10-CM

## 2018-07-06 DIAGNOSIS — S72.142A CLOSED INTERTROCHANTERIC FRACTURE OF LEFT HIP, INITIAL ENCOUNTER (HCC): ICD-10-CM

## 2018-07-06 PROBLEM — M17.10 OSTEOARTHRITIS OF KNEE: Status: ACTIVE | Noted: 2017-08-30

## 2018-07-06 PROBLEM — M25.561 ARTHRALGIA OF RIGHT KNEE: Status: ACTIVE | Noted: 2017-08-29

## 2018-07-06 PROBLEM — M17.9 OSTEOARTHRITIS OF KNEE: Status: ACTIVE | Noted: 2017-08-30

## 2018-07-06 PROBLEM — M70.50 PES ANSERINE BURSITIS: Status: ACTIVE | Noted: 2017-12-20

## 2018-07-06 PROCEDURE — 73552 X-RAY EXAM OF FEMUR 2/>: CPT

## 2018-07-06 PROCEDURE — 99213 OFFICE O/P EST LOW 20 MIN: CPT | Performed by: ORTHOPAEDIC SURGERY

## 2018-07-06 NOTE — PROGRESS NOTES
Patient Name:  Lisa Maxwell  MRN:  [id number]    Assessment & Plan    Left Hip Long IM nail for IT fracture 3/23/18, with left-sided trochanteric bursitis  1  Activities as tolerated, modification to avoid pain  2  Anti-inflammatories as needed  3  Follow-up as needed  Consider cortisone injection if pain worsens      Subjective    29-year-old female returns to the office today status post Left Hip Long IM nail for IT fracture 3/23/18  She notes a one week history of lateral based left-sided hip pain  She denies any injury or trauma  She states the pain is localized to the lateral aspect of the hip with occasional radiation distally to the knee  She denies any weakness or instability  No numbness or tingling  She completed physical therapy  No fevers or chills  She does note pain with lying on the left side  General ROS:  Negative for fever, lethargy/malaise, or night sweats  Neurological ROS:  Negative for confusion or numbness/tingling  Objective    BP (!) 175/79   Pulse 72   Ht 5' 3" (1 6 m)   Wt 74 8 kg (165 lb)   BMI 29 23 kg/m²     Left hip:  No gross deformity  Tenderness to palpation noted about the greater trochanteric bursa  No tenderness to palpation anterior hip  Hip range of motion is intact and nearly full without discomfort  Negative logroll test   Negative EUN test   Sensation intact left lower extremity  Data Review    I have personally reviewed pertinent films in PACS, and my interpretation follows  X-rays performed today of the left femur reveals stable intact orthopedic hardware in satisfactory alignment  Fracture is fully healed        Scribe Attestation    I,:   Fifi Blackburn PA-C am acting as a scribe while in the presence of the attending physician :        I,:   Nancy Henderson MD personally performed the services described in this documentation    as scribed in my presence :

## 2018-09-11 ENCOUNTER — TELEPHONE (OUTPATIENT)
Dept: FAMILY MEDICINE CLINIC | Facility: CLINIC | Age: 68
End: 2018-09-11

## 2018-09-12 DIAGNOSIS — I10 ESSENTIAL HYPERTENSION: Primary | ICD-10-CM

## 2018-09-12 RX ORDER — LOSARTAN POTASSIUM AND HYDROCHLOROTHIAZIDE 12.5; 5 MG/1; MG/1
1 TABLET ORAL DAILY
Qty: 90 TABLET | Refills: 1 | Status: SHIPPED | OUTPATIENT
Start: 2018-09-12 | End: 2018-09-19 | Stop reason: SDUPTHER

## 2018-09-19 DIAGNOSIS — I10 ESSENTIAL HYPERTENSION: ICD-10-CM

## 2018-09-19 RX ORDER — LOSARTAN POTASSIUM AND HYDROCHLOROTHIAZIDE 12.5; 5 MG/1; MG/1
1 TABLET ORAL DAILY
Qty: 90 TABLET | Refills: 1 | Status: SHIPPED | OUTPATIENT
Start: 2018-09-19 | End: 2019-02-11 | Stop reason: SDUPTHER

## 2018-09-24 DIAGNOSIS — I10 ESSENTIAL HYPERTENSION: Primary | ICD-10-CM

## 2018-09-24 DIAGNOSIS — I10 ESSENTIAL HYPERTENSION: ICD-10-CM

## 2018-09-25 RX ORDER — LOSARTAN POTASSIUM 100 MG/1
TABLET ORAL
Qty: 90 TABLET | Refills: 0 | OUTPATIENT
Start: 2018-09-25

## 2018-09-26 RX ORDER — AMLODIPINE BESYLATE 10 MG/1
TABLET ORAL
Qty: 90 TABLET | Refills: 1 | Status: SHIPPED | OUTPATIENT
Start: 2018-09-26 | End: 2019-06-19 | Stop reason: SDUPTHER

## 2018-11-14 ENCOUNTER — OFFICE VISIT (OUTPATIENT)
Dept: FAMILY MEDICINE CLINIC | Facility: CLINIC | Age: 68
End: 2018-11-14
Payer: MEDICARE

## 2018-11-14 VITALS
DIASTOLIC BLOOD PRESSURE: 84 MMHG | SYSTOLIC BLOOD PRESSURE: 130 MMHG | TEMPERATURE: 98 F | RESPIRATION RATE: 16 BRPM | BODY MASS INDEX: 30.44 KG/M2 | OXYGEN SATURATION: 96 % | HEIGHT: 63 IN | WEIGHT: 171.8 LBS | HEART RATE: 76 BPM

## 2018-11-14 DIAGNOSIS — E55.9 VITAMIN D DEFICIENCY: ICD-10-CM

## 2018-11-14 DIAGNOSIS — I10 ESSENTIAL HYPERTENSION: ICD-10-CM

## 2018-11-14 DIAGNOSIS — Z00.00 MEDICARE ANNUAL WELLNESS VISIT, SUBSEQUENT: Primary | ICD-10-CM

## 2018-11-14 PROBLEM — D69.6 THROMBOCYTOPENIA (HCC): Status: RESOLVED | Noted: 2018-03-24 | Resolved: 2018-11-14

## 2018-11-14 PROBLEM — D64.9 ANEMIA: Status: RESOLVED | Noted: 2018-03-24 | Resolved: 2018-11-14

## 2018-11-14 PROCEDURE — G0439 PPPS, SUBSEQ VISIT: HCPCS | Performed by: FAMILY MEDICINE

## 2018-11-14 PROCEDURE — 99214 OFFICE O/P EST MOD 30 MIN: CPT | Performed by: FAMILY MEDICINE

## 2018-11-14 NOTE — PROGRESS NOTES
Assessment/Plan:         Diagnoses and all orders for this visit:    Medicare annual wellness visit, subsequent    Vitamin D deficiency  -     Vitamin D 25 hydroxy; Future    Essential hypertension  -     CBC and differential; Future  -     Comprehensive metabolic panel; Future  -     TSH, 3rd generation; Future  -     Lipid panel; Future          Subjective:      Patient ID: Chu Macias is a 77 y o  female  BP runs 130's  Feeling well  no CP/SOB, no visual changes or headaches  No ankle swelling with norvasc  Rare salt in the diet  The following portions of the patient's history were reviewed and updated as appropriate: allergies, current medications, past family history, past medical history, past social history, past surgical history and problem list     Review of Systems      Objective:      /84 (BP Location: Right arm, Patient Position: Sitting, Cuff Size: Standard)   Pulse 76   Temp 98 °F (36 7 °C)   Resp 16   Ht 5' 3" (1 6 m)   Wt 77 9 kg (171 lb 12 8 oz)   SpO2 96%   BMI 30 43 kg/m²          Physical Exam   Constitutional: She is oriented to person, place, and time  She appears well-developed and well-nourished  Cardiovascular: Normal rate, regular rhythm and normal heart sounds  Pulmonary/Chest: Effort normal and breath sounds normal    Neurological: She is alert and oriented to person, place, and time  Skin: Skin is warm  Psychiatric: She has a normal mood and affect   Her behavior is normal  Judgment and thought content normal

## 2018-11-14 NOTE — PROGRESS NOTES
Assessment and Plan:    Problem List Items Addressed This Visit     None        Health Maintenance Due   Topic Date Due    Hepatitis C Screening  1950    Medicare Annual Wellness Visit (AWV)  1950    MAMMOGRAM  1950    DXA SCAN  1948    CRC Screening: Colonoscopy  1948    DTaP,Tdap,and Td Vaccines (1 - Tdap) 09/22/1969    Urinary Incontinence Screening  09/22/2013    Pneumococcal PPSV23/PCV13 65+ Years / Low and Medium Risk (1 of 2 - PCV13) 09/22/2013    INFLUENZA VACCINE  07/01/2018    PT PLAN OF CARE  07/04/2018         HPI:  Brie Blood is a 77 y o  female here for her Subsequent Wellness Visit  Patient Active Problem List   Diagnosis    Essential hypertension    Anemia    Thrombocytopenia (HCC)    Arthralgia of right knee    Osteoarthritis of knee    Osteoporosis, postmenopausal    Pes anserine bursitis    Vitamin D deficiency     Past Medical History:   Diagnosis Date    Hypertension     Shin splints     last assessed 03/27/17    Symptomatic menopausal or female climacteric states     last assessed 08/26/13     Past Surgical History:   Procedure Laterality Date    APPENDECTOMY      [place] OPEN RX FEMUR FX+INTRAMED KJ Left 3/23/2018    Procedure: LEFT HIP LONG TROCHANTERIC FEMORAL NAIL;  Surgeon: Krystina Melissa MD;  [facility];  Service: Orthopedics    TOTAL ABDOMINAL HYSTERECTOMY       Family History   Problem Relation Age of Onset    No Known Problems Mother     Heart disease Father         CABG three or more nonautogenous grafts    Heart attack Brother      History   Smoking Status    Current Some Day Smoker   Smokeless Tobacco    Never Used     Comment: rarely has a cigarette     History   Alcohol Use No      History   Drug Use No       Current Outpatient Prescriptions   Medication Sig Dispense Refill    amLODIPine (NORVASC) 10 mg tablet TAKE 1 TABLET DAILY FOR BLOOD PRESSURE   90 tablet 1    Cholecalciferol (VITAMIN D-3) 5000 units TABS Take 1 capsule by mouth daily      losartan-hydrochlorothiazide (HYZAAR) 50-12 5 mg per tablet Take 1 tablet by mouth daily 90 tablet 1     No current facility-administered medications for this visit  Allergies   Allergen Reactions    Fluzone Quadrivalent  [Influenza Vac Split Quad]      There is no immunization history for the selected administration types on file for this patient  Patient Care Team:  Rios Reinoso MD as PCP - General    Medicare Screening Tests and Risk Assessments:  Last Medicare Wellness visit information reviewed, patient interviewed and updates made to the record today  Health Risk Assessment:  Patient rates overall health as good  Patient feels that their physical health rating is Same  Eyesight was rated as Slightly worse  Hearing was rated as Same  Patient feels that their emotional and mental health rating is Same  Pain experienced by patient in the last 7 days has been None  Patient states that she has experienced no weight loss or gain in last 6 months  Emotional/Mental Health:  Patient has been feeling nervous/anxious  PHQ-9 Depression Screening:    Frequency of the following problems over the past two weeks:      1  Little interest or pleasure in doing things: 0 - not at all      2  Feeling down, depressed, or hopeless: 0 - not at all  PHQ-2 Score: 0          Broken Bones/Falls: Fall Risk Assessment:    In the past year, patient has experienced: History of falling in past year     Number of falls: 1          Bladder/Bowel:  Patient has not leaked urine accidently in the last six months  Patient reports no loss of bowel control  Immunizations:  Patient has not had a flu vaccination within the last year  Patient has not received a pneumonia shot  Patient has not received a shingles shot  Home Safety:  Patient does not have trouble with stairs inside or outside of their home     Patient currently reports that there are no safety hazards present in home, working smoke alarms, no working carbon monoxide detectors  Preventative Screenings:   No breast cancer screening performed, no colon cancer screen completed, cholesterol screen completed, glaucoma eye exam completed, (Additional Comments: Glaucoma screening limited by finances)    Nutrition:  Current diet: Regular with servings of the following:Lifestyle Choices:  Patient reports no tobacco use  Patient has smoked or used tobacco in the past   Patient has not stopped tobacco use  Patient reports no alcohol use  Patient drives a vehicle  Patient wears seat belt  Activities of Daily Living:  Can get out of bed by his or her self, able to dress self, able to make own meals, able to do own shopping, able to bathe self, can do own laundry/housekeeping, can manage own money, pay bills and track expenses    Previous Hospitalizations:  Hospitalization or ED visit in past 12 months  Number of hospitalizations within the last year: 1-2        Advanced Directives:  Patient has not decided on power of   Patient has spoken to designated power of   Patient has not completed advanced directive  Preventative Screening/Counseling:      Cardiovascular:      General: Screening Current          Diabetes:      General: Screening Current          Colorectal Cancer:      General: Patient Declines          Breast Cancer:      General: Patient Declines          Cervical Cancer:      General: Patient Declines          Osteoporosis:      General: Patient Declines          Glaucoma:      General: Screening Current          HIV:      General: Patient Declines          Hepatitis C:      General: Patient Declines        Advanced Directives:   Patient has no living will for healthcare, patient does not have an advanced directive       Immunizations:      Influenza: Patient Declines      Pneumococcal: Patient Declines      Shingrix: Patient Declines      TDAP: Patient Declines            Assessment and Plan:    Problem List Items Addressed This Visit     None      Visit Diagnoses     Medicare annual wellness visit, subsequent    -  Primary        Health Maintenance Due   Topic Date Due    Hepatitis C Screening  1950    Medicare Annual Wellness Visit (AWV)  1950    MAMMOGRAM  1950    DXA SCAN  1948    CRC Screening: Colonoscopy  1948    DTaP,Tdap,and Td Vaccines (1 - Tdap) 09/22/1969    Urinary Incontinence Screening  09/22/2013    Pneumococcal PPSV23/PCV13 65+ Years / Low and Medium Risk (1 of 2 - PCV13) 09/22/2013    INFLUENZA VACCINE  07/01/2018    PT PLAN OF CARE  07/04/2018         HPI:  Ericka Escalera is a 77 y o  female here for her Subsequent Wellness Visit      Patient Active Problem List   Diagnosis    Essential hypertension    Anemia    Thrombocytopenia (HCC)    Arthralgia of right knee    Osteoarthritis of knee    Osteoporosis, postmenopausal    Pes anserine bursitis    Vitamin D deficiency     Past Medical History:   Diagnosis Date    Hypertension     Shin splints     last assessed 03/27/17    Symptomatic menopausal or female climacteric states     last assessed 08/26/13     Past Surgical History:   Procedure Laterality Date    APPENDECTOMY      [place] [facility] FEMUR FX+INTRAMED KJ Left 3/23/2018    Procedure: LEFT HIP LONG TROCHANTERIC FEMORAL NAIL;  Surgeon: Francisco Javier Jim MD;  [facility];  Service: Orthopedics    TOTAL ABDOMINAL HYSTERECTOMY       Family History   Problem Relation Age of Onset    No Known Problems Mother     Heart disease Father         CABG three or more nonautogenous grafts    Heart attack Brother      History   Smoking Status    Current Some Day Smoker   Smokeless Tobacco    Never Used     Comment: rarely has a cigarette     History   Alcohol Use No      History   Drug Use No       Current Outpatient Prescriptions   Medication Sig Dispense Refill    amLODIPine (NORVASC) 10 mg tablet TAKE 1 TABLET DAILY FOR BLOOD PRESSURE  90 tablet 1    Cholecalciferol (VITAMIN D-3) 5000 units TABS Take 1 capsule by mouth daily      losartan-hydrochlorothiazide (HYZAAR) 50-12 5 mg per tablet Take 1 tablet by mouth daily 90 tablet 1     No current facility-administered medications for this visit  Allergies   Allergen Reactions    Fluzone Quadrivalent  [Influenza Vac Split Quad]      There is no immunization history for the selected administration types on file for this patient      Patient Care Team:  Darcy Almaraz MD as PCP - General    Medicare Screening Tests and Risk Assessments:  Medicare Annual Wellness Visit

## 2018-12-06 ENCOUNTER — APPOINTMENT (OUTPATIENT)
Dept: LAB | Facility: MEDICAL CENTER | Age: 68
End: 2018-12-06
Payer: MEDICARE

## 2018-12-06 DIAGNOSIS — E55.9 VITAMIN D DEFICIENCY: ICD-10-CM

## 2018-12-06 DIAGNOSIS — I10 ESSENTIAL HYPERTENSION: ICD-10-CM

## 2018-12-06 LAB
25(OH)D3 SERPL-MCNC: 18.6 NG/ML (ref 30–100)
ALBUMIN SERPL BCP-MCNC: 3.8 G/DL (ref 3.5–5)
ALP SERPL-CCNC: 126 U/L (ref 46–116)
ALT SERPL W P-5'-P-CCNC: 22 U/L (ref 12–78)
ANION GAP SERPL CALCULATED.3IONS-SCNC: 9 MMOL/L (ref 4–13)
AST SERPL W P-5'-P-CCNC: 17 U/L (ref 5–45)
BASOPHILS # BLD AUTO: 0.06 THOUSANDS/ΜL (ref 0–0.1)
BASOPHILS NFR BLD AUTO: 1 % (ref 0–1)
BILIRUB SERPL-MCNC: 0.4 MG/DL (ref 0.2–1)
BUN SERPL-MCNC: 23 MG/DL (ref 5–25)
CALCIUM SERPL-MCNC: 8.8 MG/DL (ref 8.3–10.1)
CHLORIDE SERPL-SCNC: 104 MMOL/L (ref 100–108)
CHOLEST SERPL-MCNC: 174 MG/DL (ref 50–200)
CO2 SERPL-SCNC: 29 MMOL/L (ref 21–32)
CREAT SERPL-MCNC: 0.9 MG/DL (ref 0.6–1.3)
EOSINOPHIL # BLD AUTO: 0.23 THOUSAND/ΜL (ref 0–0.61)
EOSINOPHIL NFR BLD AUTO: 4 % (ref 0–6)
ERYTHROCYTE [DISTWIDTH] IN BLOOD BY AUTOMATED COUNT: 11.9 % (ref 11.6–15.1)
GFR SERPL CREATININE-BSD FRML MDRD: 65 ML/MIN/1.73SQ M
GLUCOSE P FAST SERPL-MCNC: 85 MG/DL (ref 65–99)
HCT VFR BLD AUTO: 46.6 % (ref 34.8–46.1)
HDLC SERPL-MCNC: 64 MG/DL (ref 40–60)
HGB BLD-MCNC: 15 G/DL (ref 11.5–15.4)
IMM GRANULOCYTES # BLD AUTO: 0.01 THOUSAND/UL (ref 0–0.2)
IMM GRANULOCYTES NFR BLD AUTO: 0 % (ref 0–2)
LDLC SERPL CALC-MCNC: 93 MG/DL (ref 0–100)
LYMPHOCYTES # BLD AUTO: 2.35 THOUSANDS/ΜL (ref 0.6–4.47)
LYMPHOCYTES NFR BLD AUTO: 41 % (ref 14–44)
MCH RBC QN AUTO: 31.4 PG (ref 26.8–34.3)
MCHC RBC AUTO-ENTMCNC: 32.2 G/DL (ref 31.4–37.4)
MCV RBC AUTO: 98 FL (ref 82–98)
MONOCYTES # BLD AUTO: 0.51 THOUSAND/ΜL (ref 0.17–1.22)
MONOCYTES NFR BLD AUTO: 9 % (ref 4–12)
NEUTROPHILS # BLD AUTO: 2.56 THOUSANDS/ΜL (ref 1.85–7.62)
NEUTS SEG NFR BLD AUTO: 45 % (ref 43–75)
NONHDLC SERPL-MCNC: 110 MG/DL
NRBC BLD AUTO-RTO: 0 /100 WBCS
PLATELET # BLD AUTO: 238 THOUSANDS/UL (ref 149–390)
PMV BLD AUTO: 11.5 FL (ref 8.9–12.7)
POTASSIUM SERPL-SCNC: 4.4 MMOL/L (ref 3.5–5.3)
PROT SERPL-MCNC: 7.3 G/DL (ref 6.4–8.2)
RBC # BLD AUTO: 4.78 MILLION/UL (ref 3.81–5.12)
SODIUM SERPL-SCNC: 142 MMOL/L (ref 136–145)
TRIGL SERPL-MCNC: 84 MG/DL
TSH SERPL DL<=0.05 MIU/L-ACNC: 3.05 UIU/ML (ref 0.36–3.74)
WBC # BLD AUTO: 5.72 THOUSAND/UL (ref 4.31–10.16)

## 2018-12-06 PROCEDURE — 80061 LIPID PANEL: CPT

## 2018-12-06 PROCEDURE — 82306 VITAMIN D 25 HYDROXY: CPT

## 2018-12-06 PROCEDURE — 80053 COMPREHEN METABOLIC PANEL: CPT

## 2018-12-06 PROCEDURE — 36415 COLL VENOUS BLD VENIPUNCTURE: CPT

## 2018-12-06 PROCEDURE — 84443 ASSAY THYROID STIM HORMONE: CPT

## 2018-12-06 PROCEDURE — 85025 COMPLETE CBC W/AUTO DIFF WBC: CPT

## 2018-12-12 DIAGNOSIS — E55.9 VITAMIN D DEFICIENCY: Primary | ICD-10-CM

## 2018-12-12 RX ORDER — ERGOCALCIFEROL 1.25 MG/1
50000 CAPSULE ORAL WEEKLY
Qty: 4 CAPSULE | Refills: 5 | Status: SHIPPED | OUTPATIENT
Start: 2018-12-12 | End: 2019-11-14 | Stop reason: ALTCHOICE

## 2019-02-11 DIAGNOSIS — I10 ESSENTIAL HYPERTENSION: ICD-10-CM

## 2019-02-13 RX ORDER — LOSARTAN POTASSIUM AND HYDROCHLOROTHIAZIDE 12.5; 5 MG/1; MG/1
TABLET ORAL
Qty: 90 TABLET | Refills: 1 | Status: SHIPPED | OUTPATIENT
Start: 2019-02-13 | End: 2019-07-01 | Stop reason: SDUPTHER

## 2019-05-15 ENCOUNTER — OFFICE VISIT (OUTPATIENT)
Dept: FAMILY MEDICINE CLINIC | Facility: CLINIC | Age: 69
End: 2019-05-15
Payer: MEDICARE

## 2019-05-15 VITALS
SYSTOLIC BLOOD PRESSURE: 128 MMHG | HEART RATE: 74 BPM | WEIGHT: 180.6 LBS | BODY MASS INDEX: 32 KG/M2 | RESPIRATION RATE: 16 BRPM | DIASTOLIC BLOOD PRESSURE: 76 MMHG | HEIGHT: 63 IN | OXYGEN SATURATION: 98 % | TEMPERATURE: 97 F

## 2019-05-15 DIAGNOSIS — E55.9 VITAMIN D DEFICIENCY: ICD-10-CM

## 2019-05-15 DIAGNOSIS — I10 ESSENTIAL HYPERTENSION: Primary | ICD-10-CM

## 2019-05-15 PROCEDURE — 99214 OFFICE O/P EST MOD 30 MIN: CPT | Performed by: FAMILY MEDICINE

## 2019-05-16 ENCOUNTER — APPOINTMENT (OUTPATIENT)
Dept: LAB | Facility: MEDICAL CENTER | Age: 69
End: 2019-05-16
Payer: MEDICARE

## 2019-05-16 DIAGNOSIS — I10 ESSENTIAL HYPERTENSION: ICD-10-CM

## 2019-05-16 DIAGNOSIS — E55.9 VITAMIN D DEFICIENCY: ICD-10-CM

## 2019-05-16 LAB
25(OH)D3 SERPL-MCNC: 42.1 NG/ML (ref 30–100)
ALBUMIN SERPL BCP-MCNC: 4 G/DL (ref 3.5–5)
ALP SERPL-CCNC: 122 U/L (ref 46–116)
ALT SERPL W P-5'-P-CCNC: 23 U/L (ref 12–78)
ANION GAP SERPL CALCULATED.3IONS-SCNC: 6 MMOL/L (ref 4–13)
AST SERPL W P-5'-P-CCNC: 19 U/L (ref 5–45)
BASOPHILS # BLD AUTO: 0.05 THOUSANDS/ΜL (ref 0–0.1)
BASOPHILS NFR BLD AUTO: 1 % (ref 0–1)
BILIRUB SERPL-MCNC: 0.53 MG/DL (ref 0.2–1)
BUN SERPL-MCNC: 21 MG/DL (ref 5–25)
CALCIUM SERPL-MCNC: 8.6 MG/DL (ref 8.3–10.1)
CHLORIDE SERPL-SCNC: 104 MMOL/L (ref 100–108)
CHOLEST SERPL-MCNC: 171 MG/DL (ref 50–200)
CO2 SERPL-SCNC: 31 MMOL/L (ref 21–32)
CREAT SERPL-MCNC: 0.91 MG/DL (ref 0.6–1.3)
EOSINOPHIL # BLD AUTO: 0.15 THOUSAND/ΜL (ref 0–0.61)
EOSINOPHIL NFR BLD AUTO: 3 % (ref 0–6)
ERYTHROCYTE [DISTWIDTH] IN BLOOD BY AUTOMATED COUNT: 12.1 % (ref 11.6–15.1)
GFR SERPL CREATININE-BSD FRML MDRD: 64 ML/MIN/1.73SQ M
GLUCOSE P FAST SERPL-MCNC: 90 MG/DL (ref 65–99)
HCT VFR BLD AUTO: 46.5 % (ref 34.8–46.1)
HDLC SERPL-MCNC: 71 MG/DL (ref 40–60)
HGB BLD-MCNC: 14.9 G/DL (ref 11.5–15.4)
IMM GRANULOCYTES # BLD AUTO: 0.02 THOUSAND/UL (ref 0–0.2)
IMM GRANULOCYTES NFR BLD AUTO: 0 % (ref 0–2)
LDLC SERPL CALC-MCNC: 80 MG/DL (ref 0–100)
LYMPHOCYTES # BLD AUTO: 2.25 THOUSANDS/ΜL (ref 0.6–4.47)
LYMPHOCYTES NFR BLD AUTO: 38 % (ref 14–44)
MCH RBC QN AUTO: 31.5 PG (ref 26.8–34.3)
MCHC RBC AUTO-ENTMCNC: 32 G/DL (ref 31.4–37.4)
MCV RBC AUTO: 98 FL (ref 82–98)
MONOCYTES # BLD AUTO: 0.53 THOUSAND/ΜL (ref 0.17–1.22)
MONOCYTES NFR BLD AUTO: 9 % (ref 4–12)
NEUTROPHILS # BLD AUTO: 2.9 THOUSANDS/ΜL (ref 1.85–7.62)
NEUTS SEG NFR BLD AUTO: 49 % (ref 43–75)
NONHDLC SERPL-MCNC: 100 MG/DL
NRBC BLD AUTO-RTO: 0 /100 WBCS
PLATELET # BLD AUTO: 214 THOUSANDS/UL (ref 149–390)
PMV BLD AUTO: 11.7 FL (ref 8.9–12.7)
POTASSIUM SERPL-SCNC: 3.7 MMOL/L (ref 3.5–5.3)
PROT SERPL-MCNC: 7.2 G/DL (ref 6.4–8.2)
RBC # BLD AUTO: 4.73 MILLION/UL (ref 3.81–5.12)
SODIUM SERPL-SCNC: 141 MMOL/L (ref 136–145)
TRIGL SERPL-MCNC: 98 MG/DL
TSH SERPL DL<=0.05 MIU/L-ACNC: 3.14 UIU/ML (ref 0.36–3.74)
WBC # BLD AUTO: 5.9 THOUSAND/UL (ref 4.31–10.16)

## 2019-05-16 PROCEDURE — 85025 COMPLETE CBC W/AUTO DIFF WBC: CPT

## 2019-05-16 PROCEDURE — 82306 VITAMIN D 25 HYDROXY: CPT

## 2019-05-16 PROCEDURE — 80061 LIPID PANEL: CPT

## 2019-05-16 PROCEDURE — 84443 ASSAY THYROID STIM HORMONE: CPT

## 2019-05-16 PROCEDURE — 80053 COMPREHEN METABOLIC PANEL: CPT

## 2019-05-16 PROCEDURE — 36415 COLL VENOUS BLD VENIPUNCTURE: CPT

## 2019-06-19 DIAGNOSIS — I10 ESSENTIAL HYPERTENSION: ICD-10-CM

## 2019-06-19 RX ORDER — AMLODIPINE BESYLATE 10 MG/1
10 TABLET ORAL DAILY
Qty: 90 TABLET | Refills: 1 | Status: SHIPPED | OUTPATIENT
Start: 2019-06-19 | End: 2019-11-05 | Stop reason: SDUPTHER

## 2019-06-22 ENCOUNTER — OFFICE VISIT (OUTPATIENT)
Dept: URGENT CARE | Facility: MEDICAL CENTER | Age: 69
End: 2019-06-22
Payer: MEDICARE

## 2019-06-22 VITALS
HEART RATE: 84 BPM | RESPIRATION RATE: 18 BRPM | TEMPERATURE: 98.6 F | SYSTOLIC BLOOD PRESSURE: 147 MMHG | WEIGHT: 177.2 LBS | HEIGHT: 63 IN | BODY MASS INDEX: 31.4 KG/M2 | OXYGEN SATURATION: 95 % | DIASTOLIC BLOOD PRESSURE: 82 MMHG

## 2019-06-22 DIAGNOSIS — L03.115 CELLULITIS OF RIGHT LOWER EXTREMITY: Primary | ICD-10-CM

## 2019-06-22 PROCEDURE — 99213 OFFICE O/P EST LOW 20 MIN: CPT | Performed by: PHYSICIAN ASSISTANT

## 2019-06-22 PROCEDURE — G0463 HOSPITAL OUTPT CLINIC VISIT: HCPCS | Performed by: PHYSICIAN ASSISTANT

## 2019-06-22 RX ORDER — CEPHALEXIN 500 MG/1
500 CAPSULE ORAL EVERY 8 HOURS SCHEDULED
Qty: 30 CAPSULE | Refills: 0 | Status: SHIPPED | OUTPATIENT
Start: 2019-06-22 | End: 2019-07-02

## 2019-07-01 DIAGNOSIS — I10 ESSENTIAL HYPERTENSION: ICD-10-CM

## 2019-07-02 RX ORDER — LOSARTAN POTASSIUM AND HYDROCHLOROTHIAZIDE 12.5; 5 MG/1; MG/1
TABLET ORAL
Qty: 90 TABLET | Refills: 1 | Status: SHIPPED | OUTPATIENT
Start: 2019-07-02 | End: 2019-11-14 | Stop reason: SDUPTHER

## 2019-11-05 DIAGNOSIS — I10 ESSENTIAL HYPERTENSION: ICD-10-CM

## 2019-11-05 RX ORDER — AMLODIPINE BESYLATE 10 MG/1
10 TABLET ORAL DAILY
Qty: 90 TABLET | Refills: 1 | Status: SHIPPED | OUTPATIENT
Start: 2019-11-05 | End: 2019-11-14 | Stop reason: SDUPTHER

## 2019-11-14 ENCOUNTER — OFFICE VISIT (OUTPATIENT)
Dept: FAMILY MEDICINE CLINIC | Facility: CLINIC | Age: 69
End: 2019-11-14
Payer: MEDICARE

## 2019-11-14 VITALS
DIASTOLIC BLOOD PRESSURE: 80 MMHG | OXYGEN SATURATION: 97 % | SYSTOLIC BLOOD PRESSURE: 132 MMHG | TEMPERATURE: 97.5 F | WEIGHT: 167.6 LBS | BODY MASS INDEX: 29.7 KG/M2 | HEIGHT: 63 IN | HEART RATE: 78 BPM

## 2019-11-14 DIAGNOSIS — E55.9 VITAMIN D DEFICIENCY: ICD-10-CM

## 2019-11-14 DIAGNOSIS — I10 ESSENTIAL HYPERTENSION: ICD-10-CM

## 2019-11-14 DIAGNOSIS — Z00.00 MEDICARE ANNUAL WELLNESS VISIT, SUBSEQUENT: Primary | ICD-10-CM

## 2019-11-14 PROCEDURE — 99214 OFFICE O/P EST MOD 30 MIN: CPT | Performed by: FAMILY MEDICINE

## 2019-11-14 PROCEDURE — G0439 PPPS, SUBSEQ VISIT: HCPCS | Performed by: FAMILY MEDICINE

## 2019-11-14 RX ORDER — LOSARTAN POTASSIUM AND HYDROCHLOROTHIAZIDE 12.5; 5 MG/1; MG/1
1 TABLET ORAL DAILY
Qty: 90 TABLET | Refills: 1 | Status: SHIPPED | OUTPATIENT
Start: 2019-11-14 | End: 2019-11-18 | Stop reason: SDUPTHER

## 2019-11-14 RX ORDER — AMLODIPINE BESYLATE 10 MG/1
10 TABLET ORAL DAILY
Qty: 90 TABLET | Refills: 1 | Status: SHIPPED | OUTPATIENT
Start: 2019-11-14 | End: 2020-01-29 | Stop reason: SDUPTHER

## 2019-11-14 NOTE — PROGRESS NOTES
Assessment/Plan:    1  Medicare annual wellness visit, subsequent    2  Essential hypertension  -     amLODIPine (NORVASC) 10 mg tablet; Take 1 tablet (10 mg total) by mouth daily For blood pressure  -     losartan-hydrochlorothiazide (HYZAAR) 50-12 5 mg per tablet; Take 1 tablet by mouth daily  -     CBC and differential; Future  -     Comprehensive metabolic panel; Future  -     Lipid panel; Future    3  Vitamin D deficiency  -     Vitamin D 25 hydroxy; Future        There are no Patient Instructions on file for this visit  Return in about 6 months (around 5/14/2020)  Subjective:      Patient ID: Julianna Garcia is a 68 y o  female  Chief Complaint   Patient presents with    Follow-up   [facility] Wellness Visit       Here for f/u  BP controlled  No labs for this visit  Doing well  The following portions of the patient's history were reviewed and updated as appropriate: allergies, current medications, past family history, past medical history, past social history, past surgical history and problem list     Review of Systems   Constitutional: Negative for appetite change and fatigue  HENT: Negative for congestion  Respiratory: Negative for chest tightness and shortness of breath  Cardiovascular: Negative for chest pain and palpitations  Gastrointestinal: Negative for abdominal pain  Genitourinary: Negative for difficulty urinating  Neurological: Negative for headaches  Psychiatric/Behavioral: Negative for decreased concentration  The patient is not nervous/anxious            Current Outpatient Medications   Medication Sig Dispense Refill    amLODIPine (NORVASC) 10 mg tablet Take 1 tablet (10 mg total) by mouth daily For blood pressure 90 tablet 1    Cholecalciferol (VITAMIN D-3) 5000 units TABS Take 1 capsule by mouth daily      losartan-hydrochlorothiazide (HYZAAR) 50-12 5 mg per tablet Take 1 tablet by mouth daily 90 tablet 1     No current facility-administered medications for this visit  Objective:    /80 (BP Location: Right arm, Patient Position: Sitting, Cuff Size: Standard)   Pulse 78   Temp 97 5 °F (36 4 °C)   Ht 5' 3" (1 6 m)   Wt 76 kg (167 lb 9 6 oz)   SpO2 97%   BMI 29 69 kg/m²        Physical Exam   Constitutional: She is oriented to person, place, and time  She appears well-developed and well-nourished  Cardiovascular: Normal rate, regular rhythm and normal heart sounds  Pulmonary/Chest: Effort normal and breath sounds normal    Musculoskeletal: She exhibits no edema  Neurological: She is alert and oriented to person, place, and time  Skin: Skin is warm  Psychiatric: She has a normal mood and affect   Her behavior is normal  Judgment and thought content normal               Debbie Patterson MD

## 2019-11-14 NOTE — PROGRESS NOTES
Assessment and Plan:     Problem List Items Addressed This Visit     None           Preventive health issues were discussed with patient, and age appropriate screening tests were ordered as noted in patient's After Visit Summary  Personalized health advice and appropriate referrals for health education or preventive services given if needed, as noted in patient's After Visit Summary       History of Present Illness:     Patient presents for Medicare Annual Wellness visit    Patient Care Team:  Shekhar Arauz MD as PCP - General     Problem List:     Patient Active Problem List   Diagnosis    Essential hypertension    Arthralgia of right knee    Osteoarthritis of knee    Osteoporosis, postmenopausal    Pes anserine bursitis    Vitamin D deficiency      Past Medical and Surgical History:     Past Medical History:   Diagnosis Date    Hypertension     Shin splints     last assessed 03/27/17    Symptomatic menopausal or female climacteric states     last assessed 08/26/13     Past Surgical History:   Procedure Laterality Date    APPENDECTOMY      [place] OPEN RX FEMUR FX+INTRAMED KJ Left 3/23/2018    Procedure: LEFT HIP LONG TROCHANTERIC FEMORAL NAIL;  Surgeon: Kanika García MD;  [facility];  Service: Orthopedics    TOTAL ABDOMINAL HYSTERECTOMY        Family History:     Family History   Problem Relation Age of Onset    No Known Problems Mother     Heart disease Father         CABG three or more nonautogenous grafts    Heart attack Brother       Social History:     Social History     Socioeconomic History    Marital status: /Civil Union     Spouse name: Not on file    Number of children: Not on file    Years of education: Not on file    Highest education level: Not on file   Occupational History    Not on file   Social Needs    Financial resource strain: Not on file    Food insecurity:     Worry: Not on file     Inability: Not on file    Transportation needs:     Medical: Not on file     Non-medical: Not on file   Tobacco Use    Smoking status: Former Smoker    Smokeless tobacco: Never Used    Tobacco comment: rarely has a cigarette   Substance and Sexual Activity    Alcohol use: No    Drug use: No    Sexual activity: Not on file   Lifestyle    Physical activity:     Days per week: Not on file     Minutes per session: Not on file    Stress: Not on file   Relationships    Social connections:     Talks on phone: Not on file     Gets together: Not on file     Attends [employer] service: Not on file     Active member of club or organization: Not on file     Attends meetings of clubs or organizations: Not on file     Relationship status: Not on file    Intimate partner violence:     Fear of current or ex partner: Not on file     Emotionally abused: Not on file     Physically abused: Not on file     Forced sexual activity: Not on file   Other Topics Concern    Not on file   Social History Narrative    Not on file       Medications and Allergies:     Current Outpatient Medications   Medication Sig Dispense Refill    amLODIPine (NORVASC) 10 mg tablet TAKE 1 TABLET (10 MG TOTAL) BY MOUTH DAILY FOR BLOOD PRESSURE 90 tablet 1    Cholecalciferol (VITAMIN D-3) 5000 units TABS Take 1 capsule by mouth daily      ergocalciferol (VITAMIN D2) 50,000 units Take 1 capsule (50,000 Units total) by mouth once a week 4 capsule 5    losartan-hydrochlorothiazide (HYZAAR) 50-12 5 mg per tablet TAKE 1 TABLET EVERY DAY 90 tablet 1     No current facility-administered medications for this visit  Allergies   Allergen Reactions    Fluzone Quadrivalent  [Influenza Vac Split Quad]       Immunizations: There is no immunization history for the selected administration types on file for this patient     Health Maintenance:         Topic Date Due    Hepatitis C Screening  1950    DXA SCAN  11/15/2019 (Originally 1950)    MAMMOGRAM  05/15/2020 (Originally 1950)    CRC Screening: Colonoscopy  05/15/2020 (Originally 1950)         Topic Date Due    DTaP,Tdap,and Td Vaccines (1 - Tdap) 09/22/1959    Pneumococcal Vaccine: 65+ Years (1 of 2 - PCV13) 09/22/2013      Medicare Health Risk Assessment:     /80 (BP Location: Right arm, Patient Position: Sitting, Cuff Size: Standard)   Pulse 78   Temp 97 5 °F (36 4 °C)   Ht 5' 3" (1 6 m)   Wt 76 kg (167 lb 9 6 oz)   SpO2 97%   BMI 29 69 kg/m²      Gin Dupree is here for her Subsequent Wellness visit  Last Medicare Wellness visit information reviewed, patient interviewed and updates made to the record today  Health Risk Assessment:   Patient rates overall health as good  Patient feels that their physical health rating is same  Eyesight was rated as same  Hearing was rated as same  Patient feels that their emotional and mental health rating is same  Pain experienced in the last 7 days has been none  Patient states that she has experienced no weight loss or gain in last 6 months  Depression Screening:   PHQ-2 Score: 0      Fall Risk Screening: In the past year, patient has experienced: history of falling in past year    Number of falls: 1  Injured during fall?: No      Urinary Incontinence Screening:   Patient has not leaked urine accidently in the last six months  Home Safety:  Patient does not have trouble with stairs inside or outside of their home  Patient has working smoke alarms and has no working carbon monoxide detector  Home safety hazards include: none  Nutrition:   Current diet is Regular  Medications:   Patient is currently taking over-the-counter supplements  OTC medications include: see medication list  Patient is able to manage medications  Activities of Daily Living (ADLs)/Instrumental Activities of Daily Living (IADLs):   Walk and transfer into and out of bed and chair?: Yes  Dress and groom yourself?: Yes    Bathe or shower yourself?: Yes    Feed yourself?  Yes  Do your laundry/housekeeping?: Yes  Manage your money, pay your bills and track your expenses?: Yes  Make your own meals?: Yes    Do your own shopping?: Yes    Previous Hospitalizations:   Any hospitalizations or ED visits within the last 12 months?: No      Advance Care Planning:   Living will: No    Advanced directive: No      Cognitive Screening:   Provider or family/friend/caregiver concerned regarding cognition?: No    PREVENTIVE SCREENINGS      Cardiovascular Screening:    General: Screening Current      Diabetes Screening:     General: Screening Current      Colorectal Cancer Screening:     General: Screening Current      Breast Cancer Screening:     General: Patient Declines      Cervical Cancer Screening:    General: Screening Not Indicated      Osteoporosis Screening:    General: History Osteoporosis and Patient Declines      Abdominal Aortic Aneurysm (AAA) Screening:        General: Screening Not Indicated      Lung Cancer Screening:     General: Patient Declines      Hepatitis C Screening:    General: Patient Declines      [place] MD

## 2019-11-18 DIAGNOSIS — I10 ESSENTIAL HYPERTENSION: ICD-10-CM

## 2019-11-18 RX ORDER — LOSARTAN POTASSIUM AND HYDROCHLOROTHIAZIDE 12.5; 5 MG/1; MG/1
TABLET ORAL
Qty: 90 TABLET | Refills: 1 | Status: SHIPPED | OUTPATIENT
Start: 2019-11-18 | End: 2020-01-29 | Stop reason: SDUPTHER

## 2019-12-20 ENCOUNTER — APPOINTMENT (OUTPATIENT)
Dept: LAB | Facility: MEDICAL CENTER | Age: 69
End: 2019-12-20
Payer: MEDICARE

## 2019-12-20 DIAGNOSIS — I10 ESSENTIAL HYPERTENSION: ICD-10-CM

## 2019-12-20 DIAGNOSIS — E55.9 VITAMIN D DEFICIENCY: ICD-10-CM

## 2019-12-20 LAB
25(OH)D3 SERPL-MCNC: 59.1 NG/ML (ref 30–100)
ALBUMIN SERPL BCP-MCNC: 4.2 G/DL (ref 3.5–5)
ALP SERPL-CCNC: 95 U/L (ref 46–116)
ALT SERPL W P-5'-P-CCNC: 23 U/L (ref 12–78)
ANION GAP SERPL CALCULATED.3IONS-SCNC: 3 MMOL/L (ref 4–13)
AST SERPL W P-5'-P-CCNC: 13 U/L (ref 5–45)
BASOPHILS # BLD AUTO: 0.04 THOUSANDS/ΜL (ref 0–0.1)
BASOPHILS NFR BLD AUTO: 1 % (ref 0–1)
BILIRUB SERPL-MCNC: 0.54 MG/DL (ref 0.2–1)
BUN SERPL-MCNC: 18 MG/DL (ref 5–25)
CALCIUM SERPL-MCNC: 9.7 MG/DL (ref 8.3–10.1)
CHLORIDE SERPL-SCNC: 105 MMOL/L (ref 100–108)
CHOLEST SERPL-MCNC: 166 MG/DL (ref 50–200)
CO2 SERPL-SCNC: 32 MMOL/L (ref 21–32)
CREAT SERPL-MCNC: 0.83 MG/DL (ref 0.6–1.3)
EOSINOPHIL # BLD AUTO: 0.08 THOUSAND/ΜL (ref 0–0.61)
EOSINOPHIL NFR BLD AUTO: 2 % (ref 0–6)
ERYTHROCYTE [DISTWIDTH] IN BLOOD BY AUTOMATED COUNT: 12.4 % (ref 11.6–15.1)
GFR SERPL CREATININE-BSD FRML MDRD: 71 ML/MIN/1.73SQ M
GLUCOSE P FAST SERPL-MCNC: 87 MG/DL (ref 65–99)
HCT VFR BLD AUTO: 45.5 % (ref 34.8–46.1)
HDLC SERPL-MCNC: 70 MG/DL
HGB BLD-MCNC: 14.5 G/DL (ref 11.5–15.4)
IMM GRANULOCYTES # BLD AUTO: 0.01 THOUSAND/UL (ref 0–0.2)
IMM GRANULOCYTES NFR BLD AUTO: 0 % (ref 0–2)
LDLC SERPL CALC-MCNC: 72 MG/DL (ref 0–100)
LYMPHOCYTES # BLD AUTO: 1.98 THOUSANDS/ΜL (ref 0.6–4.47)
LYMPHOCYTES NFR BLD AUTO: 39 % (ref 14–44)
MCH RBC QN AUTO: 32 PG (ref 26.8–34.3)
MCHC RBC AUTO-ENTMCNC: 31.9 G/DL (ref 31.4–37.4)
MCV RBC AUTO: 100 FL (ref 82–98)
MONOCYTES # BLD AUTO: 0.38 THOUSAND/ΜL (ref 0.17–1.22)
MONOCYTES NFR BLD AUTO: 8 % (ref 4–12)
NEUTROPHILS # BLD AUTO: 2.59 THOUSANDS/ΜL (ref 1.85–7.62)
NEUTS SEG NFR BLD AUTO: 50 % (ref 43–75)
NONHDLC SERPL-MCNC: 96 MG/DL
NRBC BLD AUTO-RTO: 0 /100 WBCS
PLATELET # BLD AUTO: 227 THOUSANDS/UL (ref 149–390)
PMV BLD AUTO: 11.3 FL (ref 8.9–12.7)
POTASSIUM SERPL-SCNC: 3.8 MMOL/L (ref 3.5–5.3)
PROT SERPL-MCNC: 7.5 G/DL (ref 6.4–8.2)
RBC # BLD AUTO: 4.53 MILLION/UL (ref 3.81–5.12)
SODIUM SERPL-SCNC: 140 MMOL/L (ref 136–145)
TRIGL SERPL-MCNC: 121 MG/DL
WBC # BLD AUTO: 5.08 THOUSAND/UL (ref 4.31–10.16)

## 2019-12-20 PROCEDURE — 80053 COMPREHEN METABOLIC PANEL: CPT

## 2019-12-20 PROCEDURE — 85025 COMPLETE CBC W/AUTO DIFF WBC: CPT

## 2019-12-20 PROCEDURE — 80061 LIPID PANEL: CPT

## 2019-12-20 PROCEDURE — 82306 VITAMIN D 25 HYDROXY: CPT

## 2019-12-20 PROCEDURE — 36415 COLL VENOUS BLD VENIPUNCTURE: CPT

## 2020-01-21 ENCOUNTER — TELEPHONE (OUTPATIENT)
Dept: FAMILY MEDICINE CLINIC | Facility: CLINIC | Age: 70
End: 2020-01-21

## 2020-01-29 DIAGNOSIS — I10 ESSENTIAL HYPERTENSION: ICD-10-CM

## 2020-01-29 RX ORDER — LOSARTAN POTASSIUM AND HYDROCHLOROTHIAZIDE 12.5; 5 MG/1; MG/1
1 TABLET ORAL DAILY
Qty: 90 TABLET | Refills: 1 | Status: SHIPPED | OUTPATIENT
Start: 2020-01-29 | End: 2020-04-13 | Stop reason: SDUPTHER

## 2020-01-29 RX ORDER — AMLODIPINE BESYLATE 10 MG/1
10 TABLET ORAL DAILY
Qty: 90 TABLET | Refills: 1 | Status: SHIPPED | OUTPATIENT
Start: 2020-01-29 | End: 2020-05-18 | Stop reason: SDUPTHER

## 2020-04-13 DIAGNOSIS — I10 ESSENTIAL HYPERTENSION: ICD-10-CM

## 2020-04-13 RX ORDER — LOSARTAN POTASSIUM AND HYDROCHLOROTHIAZIDE 12.5; 5 MG/1; MG/1
1 TABLET ORAL DAILY
Qty: 90 TABLET | Refills: 0 | Status: SHIPPED | OUTPATIENT
Start: 2020-04-13 | End: 2020-05-18 | Stop reason: SDUPTHER

## 2020-05-18 ENCOUNTER — OFFICE VISIT (OUTPATIENT)
Dept: FAMILY MEDICINE CLINIC | Facility: CLINIC | Age: 70
End: 2020-05-18
Payer: MEDICARE

## 2020-05-18 VITALS
BODY MASS INDEX: 29.34 KG/M2 | WEIGHT: 165.6 LBS | HEIGHT: 63 IN | HEART RATE: 82 BPM | DIASTOLIC BLOOD PRESSURE: 92 MMHG | OXYGEN SATURATION: 97 % | TEMPERATURE: 96.7 F | SYSTOLIC BLOOD PRESSURE: 160 MMHG

## 2020-05-18 DIAGNOSIS — E55.9 VITAMIN D DEFICIENCY: ICD-10-CM

## 2020-05-18 DIAGNOSIS — I10 ESSENTIAL HYPERTENSION: Primary | ICD-10-CM

## 2020-05-18 PROCEDURE — 3077F SYST BP >= 140 MM HG: CPT | Performed by: FAMILY MEDICINE

## 2020-05-18 PROCEDURE — 1036F TOBACCO NON-USER: CPT | Performed by: FAMILY MEDICINE

## 2020-05-18 PROCEDURE — 1160F RVW MEDS BY RX/DR IN RCRD: CPT | Performed by: FAMILY MEDICINE

## 2020-05-18 PROCEDURE — 3080F DIAST BP >= 90 MM HG: CPT | Performed by: FAMILY MEDICINE

## 2020-05-18 PROCEDURE — 99214 OFFICE O/P EST MOD 30 MIN: CPT | Performed by: FAMILY MEDICINE

## 2020-05-18 PROCEDURE — 3008F BODY MASS INDEX DOCD: CPT | Performed by: FAMILY MEDICINE

## 2020-05-18 RX ORDER — LOSARTAN POTASSIUM AND HYDROCHLOROTHIAZIDE 12.5; 5 MG/1; MG/1
1 TABLET ORAL DAILY
Qty: 90 TABLET | Refills: 1 | Status: SHIPPED | OUTPATIENT
Start: 2020-05-18 | End: 2021-02-22 | Stop reason: SDUPTHER

## 2020-05-18 RX ORDER — AMLODIPINE BESYLATE 10 MG/1
10 TABLET ORAL DAILY
Qty: 90 TABLET | Refills: 1 | Status: SHIPPED | OUTPATIENT
Start: 2020-05-18 | End: 2021-02-22 | Stop reason: SDUPTHER

## 2020-06-16 ENCOUNTER — APPOINTMENT (OUTPATIENT)
Dept: LAB | Facility: MEDICAL CENTER | Age: 70
End: 2020-06-16
Payer: MEDICARE

## 2020-06-16 DIAGNOSIS — I10 ESSENTIAL HYPERTENSION: ICD-10-CM

## 2020-06-16 LAB
ALBUMIN SERPL BCP-MCNC: 4.3 G/DL (ref 3.5–5)
ALP SERPL-CCNC: 97 U/L (ref 46–116)
ALT SERPL W P-5'-P-CCNC: 20 U/L (ref 12–78)
ANION GAP SERPL CALCULATED.3IONS-SCNC: 5 MMOL/L (ref 4–13)
AST SERPL W P-5'-P-CCNC: 14 U/L (ref 5–45)
BASOPHILS # BLD AUTO: 0.03 THOUSANDS/ΜL (ref 0–0.1)
BASOPHILS NFR BLD AUTO: 1 % (ref 0–1)
BILIRUB SERPL-MCNC: 0.43 MG/DL (ref 0.2–1)
BUN SERPL-MCNC: 13 MG/DL (ref 5–25)
CALCIUM SERPL-MCNC: 9.7 MG/DL (ref 8.3–10.1)
CHLORIDE SERPL-SCNC: 103 MMOL/L (ref 100–108)
CHOLEST SERPL-MCNC: 180 MG/DL (ref 50–200)
CO2 SERPL-SCNC: 30 MMOL/L (ref 21–32)
CREAT SERPL-MCNC: 0.77 MG/DL (ref 0.6–1.3)
EOSINOPHIL # BLD AUTO: 0.08 THOUSAND/ΜL (ref 0–0.61)
EOSINOPHIL NFR BLD AUTO: 2 % (ref 0–6)
ERYTHROCYTE [DISTWIDTH] IN BLOOD BY AUTOMATED COUNT: 11.9 % (ref 11.6–15.1)
GFR SERPL CREATININE-BSD FRML MDRD: 78 ML/MIN/1.73SQ M
GLUCOSE P FAST SERPL-MCNC: 93 MG/DL (ref 65–99)
HCT VFR BLD AUTO: 48.1 % (ref 34.8–46.1)
HDLC SERPL-MCNC: 69 MG/DL
HGB BLD-MCNC: 15.6 G/DL (ref 11.5–15.4)
IMM GRANULOCYTES # BLD AUTO: 0.02 THOUSAND/UL (ref 0–0.2)
IMM GRANULOCYTES NFR BLD AUTO: 0 % (ref 0–2)
LDLC SERPL CALC-MCNC: 87 MG/DL (ref 0–100)
LYMPHOCYTES # BLD AUTO: 1.42 THOUSANDS/ΜL (ref 0.6–4.47)
LYMPHOCYTES NFR BLD AUTO: 26 % (ref 14–44)
MCH RBC QN AUTO: 32.5 PG (ref 26.8–34.3)
MCHC RBC AUTO-ENTMCNC: 32.4 G/DL (ref 31.4–37.4)
MCV RBC AUTO: 100 FL (ref 82–98)
MONOCYTES # BLD AUTO: 0.47 THOUSAND/ΜL (ref 0.17–1.22)
MONOCYTES NFR BLD AUTO: 9 % (ref 4–12)
NEUTROPHILS # BLD AUTO: 3.45 THOUSANDS/ΜL (ref 1.85–7.62)
NEUTS SEG NFR BLD AUTO: 62 % (ref 43–75)
NONHDLC SERPL-MCNC: 111 MG/DL
NRBC BLD AUTO-RTO: 0 /100 WBCS
PLATELET # BLD AUTO: 211 THOUSANDS/UL (ref 149–390)
PMV BLD AUTO: 11.9 FL (ref 8.9–12.7)
POTASSIUM SERPL-SCNC: 4 MMOL/L (ref 3.5–5.3)
PROT SERPL-MCNC: 7.7 G/DL (ref 6.4–8.2)
RBC # BLD AUTO: 4.8 MILLION/UL (ref 3.81–5.12)
SODIUM SERPL-SCNC: 138 MMOL/L (ref 136–145)
TRIGL SERPL-MCNC: 119 MG/DL
WBC # BLD AUTO: 5.47 THOUSAND/UL (ref 4.31–10.16)

## 2020-06-16 PROCEDURE — 80061 LIPID PANEL: CPT

## 2020-06-16 PROCEDURE — 36415 COLL VENOUS BLD VENIPUNCTURE: CPT

## 2020-06-16 PROCEDURE — 80053 COMPREHEN METABOLIC PANEL: CPT

## 2020-06-16 PROCEDURE — 85025 COMPLETE CBC W/AUTO DIFF WBC: CPT

## 2020-11-18 ENCOUNTER — OFFICE VISIT (OUTPATIENT)
Dept: FAMILY MEDICINE CLINIC | Facility: CLINIC | Age: 70
End: 2020-11-18
Payer: MEDICARE

## 2020-11-18 VITALS
OXYGEN SATURATION: 95 % | BODY MASS INDEX: 28.67 KG/M2 | HEIGHT: 63 IN | HEART RATE: 84 BPM | DIASTOLIC BLOOD PRESSURE: 90 MMHG | SYSTOLIC BLOOD PRESSURE: 152 MMHG | WEIGHT: 161.8 LBS | TEMPERATURE: 97.4 F

## 2020-11-18 DIAGNOSIS — E55.9 VITAMIN D DEFICIENCY: ICD-10-CM

## 2020-11-18 DIAGNOSIS — Z00.00 MEDICARE ANNUAL WELLNESS VISIT, SUBSEQUENT: ICD-10-CM

## 2020-11-18 DIAGNOSIS — I10 ESSENTIAL HYPERTENSION: Primary | ICD-10-CM

## 2020-11-18 PROCEDURE — 1123F ACP DISCUSS/DSCN MKR DOCD: CPT | Performed by: FAMILY MEDICINE

## 2020-11-18 PROCEDURE — G0438 PPPS, INITIAL VISIT: HCPCS | Performed by: FAMILY MEDICINE

## 2020-11-18 PROCEDURE — 99214 OFFICE O/P EST MOD 30 MIN: CPT | Performed by: FAMILY MEDICINE

## 2021-02-22 DIAGNOSIS — I10 ESSENTIAL HYPERTENSION: ICD-10-CM

## 2021-02-22 RX ORDER — LOSARTAN POTASSIUM AND HYDROCHLOROTHIAZIDE 12.5; 5 MG/1; MG/1
1 TABLET ORAL DAILY
Qty: 90 TABLET | Refills: 1 | Status: SHIPPED | OUTPATIENT
Start: 2021-02-22 | End: 2021-07-07

## 2021-02-22 RX ORDER — AMLODIPINE BESYLATE 10 MG/1
10 TABLET ORAL DAILY
Qty: 90 TABLET | Refills: 1 | Status: SHIPPED | OUTPATIENT
Start: 2021-02-22 | End: 2021-07-07

## 2021-03-15 ENCOUNTER — OFFICE VISIT (OUTPATIENT)
Dept: FAMILY MEDICINE CLINIC | Facility: CLINIC | Age: 71
End: 2021-03-15
Payer: COMMERCIAL

## 2021-03-15 VITALS
HEIGHT: 63 IN | WEIGHT: 161.6 LBS | OXYGEN SATURATION: 97 % | HEART RATE: 80 BPM | TEMPERATURE: 98.2 F | SYSTOLIC BLOOD PRESSURE: 168 MMHG | DIASTOLIC BLOOD PRESSURE: 94 MMHG | BODY MASS INDEX: 28.63 KG/M2

## 2021-03-15 DIAGNOSIS — M17.11 ARTHRITIS OF RIGHT KNEE: Primary | ICD-10-CM

## 2021-03-15 PROCEDURE — 99213 OFFICE O/P EST LOW 20 MIN: CPT | Performed by: FAMILY MEDICINE

## 2021-03-15 PROCEDURE — 3725F SCREEN DEPRESSION PERFORMED: CPT | Performed by: FAMILY MEDICINE

## 2021-03-15 NOTE — PROGRESS NOTES
Assessment/Plan:    1  Arthritis of right knee  Comments:  Referral for ortho, recommended Voltaren gel for pain  Orders:  -     Ambulatory referral to Orthopedic Surgery; Future        There are no Patient Instructions on file for this visit  Return if symptoms worsen or fail to improve  Subjective:      Patient ID: Julianna Garcia is a 65 y o  female  Chief Complaint   Patient presents with    right knee pain       Pt here for right knee pain  She has had pain for years and was seeing ortho but hasn't follow-up since left femur fracture  That has resolved and she would like to get reconnected with ortho  She denies numbness or tingling down extremity  She takes tylenol around the clock which helps but she still has significant pain  She does exercises at home which has helped and is not interested in PT at this time  Asked about form for DMV  Has not needed to use assistive devices at this time  The following portions of the patient's history were reviewed and updated as appropriate: allergies, current medications, past family history, past medical history, past social history, past surgical history and problem list     Review of Systems   Constitutional: Negative for fatigue and fever  HENT: Negative for congestion  Eyes: Negative for visual disturbance  Respiratory: Negative for chest tightness and shortness of breath  Cardiovascular: Negative for chest pain and palpitations  Gastrointestinal: Negative for abdominal pain  Genitourinary: Negative for difficulty urinating  Musculoskeletal: Positive for arthralgias (r knee)  Neurological: Negative for weakness, numbness and headaches  Hematological: Does not bruise/bleed easily           Current Outpatient Medications   Medication Sig Dispense Refill    amLODIPine (NORVASC) 10 mg tablet Take 1 tablet (10 mg total) by mouth daily For blood pressure 90 tablet 1    Cholecalciferol (VITAMIN D-3) 5000 units TABS Take 1 capsule by mouth daily      losartan-hydrochlorothiazide (HYZAAR) 50-12 5 mg per tablet Take 1 tablet by mouth daily 90 tablet 1     No current facility-administered medications for this visit  Objective:    /94 (BP Location: Right arm, Patient Position: Sitting, Cuff Size: Standard)   Pulse 80   Temp 98 2 °F (36 8 °C)   Ht 5' 3" (1 6 m)   Wt 73 3 kg (161 lb 9 6 oz)   SpO2 97%   BMI 28 63 kg/m²        Physical Exam  Vitals signs reviewed  Constitutional:       Appearance: She is well-developed  Cardiovascular:      Rate and Rhythm: Normal rate and regular rhythm  Heart sounds: Normal heart sounds  Pulmonary:      Effort: Pulmonary effort is normal       Breath sounds: Normal breath sounds  Musculoskeletal: Normal range of motion  General: Tenderness (behind the knee ) present  No swelling, deformity or signs of injury  Comments: Strength 5/5, full active and passive ROM   Skin:     General: Skin is warm  Neurological:      Mental Status: She is alert and oriented to person, place, and time  Psychiatric:         Behavior: Behavior normal          Thought Content:  Thought content normal          Judgment: Judgment normal                 Karie Andrade MD

## 2021-03-23 ENCOUNTER — APPOINTMENT (OUTPATIENT)
Dept: RADIOLOGY | Facility: MEDICAL CENTER | Age: 71
End: 2021-03-23
Payer: COMMERCIAL

## 2021-03-23 ENCOUNTER — OFFICE VISIT (OUTPATIENT)
Dept: OBGYN CLINIC | Facility: MEDICAL CENTER | Age: 71
End: 2021-03-23
Payer: COMMERCIAL

## 2021-03-23 VITALS
SYSTOLIC BLOOD PRESSURE: 177 MMHG | HEIGHT: 63 IN | BODY MASS INDEX: 28.53 KG/M2 | DIASTOLIC BLOOD PRESSURE: 81 MMHG | HEART RATE: 90 BPM | WEIGHT: 161 LBS

## 2021-03-23 DIAGNOSIS — M25.561 CHRONIC PAIN OF RIGHT KNEE: ICD-10-CM

## 2021-03-23 DIAGNOSIS — G89.29 CHRONIC PAIN OF RIGHT KNEE: ICD-10-CM

## 2021-03-23 DIAGNOSIS — M17.11 ARTHRITIS OF RIGHT KNEE: Primary | ICD-10-CM

## 2021-03-23 DIAGNOSIS — M17.11 ARTHRITIS OF RIGHT KNEE: ICD-10-CM

## 2021-03-23 PROCEDURE — 3079F DIAST BP 80-89 MM HG: CPT | Performed by: EMERGENCY MEDICINE

## 2021-03-23 PROCEDURE — 73562 X-RAY EXAM OF KNEE 3: CPT

## 2021-03-23 PROCEDURE — 20610 DRAIN/INJ JOINT/BURSA W/O US: CPT | Performed by: EMERGENCY MEDICINE

## 2021-03-23 PROCEDURE — 1036F TOBACCO NON-USER: CPT | Performed by: EMERGENCY MEDICINE

## 2021-03-23 PROCEDURE — 99213 OFFICE O/P EST LOW 20 MIN: CPT | Performed by: EMERGENCY MEDICINE

## 2021-03-23 PROCEDURE — 1160F RVW MEDS BY RX/DR IN RCRD: CPT | Performed by: EMERGENCY MEDICINE

## 2021-03-23 PROCEDURE — 3008F BODY MASS INDEX DOCD: CPT | Performed by: EMERGENCY MEDICINE

## 2021-03-23 PROCEDURE — 3077F SYST BP >= 140 MM HG: CPT | Performed by: EMERGENCY MEDICINE

## 2021-03-23 RX ORDER — LIDOCAINE HYDROCHLORIDE 10 MG/ML
4 INJECTION, SOLUTION INFILTRATION; PERINEURAL
Status: COMPLETED | OUTPATIENT
Start: 2021-03-23 | End: 2021-03-23

## 2021-03-23 RX ORDER — TRIAMCINOLONE ACETONIDE 40 MG/ML
40 INJECTION, SUSPENSION INTRA-ARTICULAR; INTRAMUSCULAR
Status: COMPLETED | OUTPATIENT
Start: 2021-03-23 | End: 2021-03-23

## 2021-03-23 RX ADMIN — LIDOCAINE HYDROCHLORIDE 4 ML: 10 INJECTION, SOLUTION INFILTRATION; PERINEURAL at 11:42

## 2021-03-23 RX ADMIN — TRIAMCINOLONE ACETONIDE 40 MG: 40 INJECTION, SUSPENSION INTRA-ARTICULAR; INTRAMUSCULAR at 11:42

## 2021-03-23 NOTE — PROGRESS NOTES
Assessment/Plan:    Diagnoses and all orders for this visit:    Arthritis of right knee  Comments:  Referral for ortho, recommended Voltaren gel for pain  Orders:  -     Ambulatory referral to Orthopedic Surgery  -     XR knee 3 vw right non injury; Future  -     Large joint arthrocentesis: R knee    Chronic pain of right knee  -     Large joint arthrocentesis: R knee    Steroid injection right knee  Discussed PT and visco   Continue OTC meds instructions provided  Return if symptoms worsen or fail to improve  Chief Complaint:     Chief Complaint   Patient presents with    Right Knee - Pain       Subjective:   Patient ID: Nara Vidales is a 65 y o  female  Patient presents for chronic right knee pain mostly medial she has a history of significant osteoarthritis status post steroid injection with no significant benefit from 2018  She takes Tylenol regularly      Review of Systems   Constitutional: Negative for chills and fever  HENT: Negative for drooling and sore throat  Eyes: Negative for pain and redness  Respiratory: Negative for shortness of breath and stridor  Cardiovascular: Negative for chest pain and palpitations  Gastrointestinal: Negative for abdominal pain  Genitourinary: Negative for difficulty urinating  Musculoskeletal: Positive for arthralgias and gait problem  Skin: Negative for rash  Neurological: Negative for weakness  Psychiatric/Behavioral: Negative for self-injury and suicidal ideas  The following portions of the patient's chart were reviewed and updated as appropriate:    Allergy:    Allergies   Allergen Reactions    Fluzone Quadrivalent  [Influenza Vac Split Quad]          Past Medical History:   Diagnosis Date    Hypertension     Shin splints     last assessed 03/27/17    Symptomatic menopausal or female climacteric states     last assessed 08/26/13       Past Surgical History:   Procedure Laterality Date    APPENDECTOMY      WI OPEN RX FEMUR FX+INTRAMED KJ Left 3/23/2018    Procedure: LEFT HIP LONG TROCHANTERIC FEMORAL NAIL;  Surgeon: Jessica Boucher MD;  [facility];  Service: Orthopedics    TOTAL ABDOMINAL HYSTERECTOMY         Social History     Socioeconomic History    Marital status: /Civil Union     Spouse name: Not on file    Number of children: Not on file    Years of education: Not on file    Highest education level: Not on file   Occupational History    Not on file   Social Needs    Financial resource strain: Not on file    Food insecurity     Worry: Not on file     Inability: Not on file    Transportation needs     Medical: Not on file     Non-medical: Not on file   Tobacco Use    Smoking status: Former Smoker     Types: Cigarettes    Smokeless tobacco: Never Used    Tobacco comment: rarely has a cigarette   Substance and Sexual Activity    Alcohol use: No    Drug use: No    Sexual activity: Not on file   Lifestyle    Physical activity     Days per week: Not on file     Minutes per session: Not on file    Stress: Not on file   Relationships    Social connections     Talks on phone: Not on file     Gets together: Not on file     Attends Zoroastrian service: Not on file     Active member of club or organization: Not on file     Attends meetings of clubs or organizations: Not on file     Relationship status: Not on file    Intimate partner violence     Fear of current or ex partner: Not on file     Emotionally abused: Not on file     Physically abused: Not on file     Forced sexual activity: Not on file   Other Topics Concern    Not on file   Social History Narrative    Not on file       Family History   Problem Relation Age of Onset    No Known Problems Mother     Heart disease Father         CABG three or more nonautogenous grafts    Heart attack Brother        Medications:    Current Outpatient Medications:     amLODIPine (NORVASC) 10 mg tablet, Take 1 tablet (10 mg total) by mouth daily For blood pressure, Disp: 90 tablet, Rfl: 1    Cholecalciferol (VITAMIN D-3) 5000 units TABS, Take 1 capsule by mouth daily, Disp: , Rfl:     losartan-hydrochlorothiazide (HYZAAR) 50-12 5 mg per tablet, Take 1 tablet by mouth daily, Disp: 90 tablet, Rfl: 1    Patient Active Problem List   Diagnosis    Essential hypertension    Arthralgia of right knee    Osteoarthritis of knee    Osteoporosis, postmenopausal    Pes anserine bursitis    Vitamin D deficiency       Objective:  BP (!) 177/81 (BP Location: Right arm, Patient Position: Sitting, Cuff Size: Standard)   Pulse 90   Ht 5' 3" (1 6 m)   Wt 73 kg (161 lb)   BMI 28 52 kg/m²     Right Knee Exam     Tenderness   The patient is experiencing no tenderness  Range of Motion   Extension: normal   Flexion: abnormal     Tests   Varus: negative Valgus: negative    Other   Erythema: absent  Sensation: normal  Swelling: mild            Physical Exam  Vitals signs reviewed  Constitutional:       Appearance: She is well-developed  HENT:      Head: Normocephalic and atraumatic  Eyes:      Conjunctiva/sclera: Conjunctivae normal    Neck:      Musculoskeletal: Normal range of motion and neck supple  Cardiovascular:      Rate and Rhythm: Normal rate  Pulmonary:      Effort: Pulmonary effort is normal  No respiratory distress  Skin:     General: Skin is warm and dry  Neurological:      Mental Status: She is alert and oriented to person, place, and time  Psychiatric:         Behavior: Behavior normal            Neurologic Exam     Mental Status   Oriented to person, place, and time  Large joint arthrocentesis: R knee  Universal Protocol:  Consent: Verbal consent obtained  Risks and benefits: risks, benefits and alternatives were discussed  Consent given by: patient  Time out: Immediately prior to procedure a "time out" was called to verify the correct patient, procedure, equipment, support staff and site/side marked as required    Timeout called at: 3/23/2021 11:41 AM   Patient understanding: patient states understanding of the procedure being performed  Test results: test results available and properly labeled  Site marked: the operative site was marked  Patient identity confirmed: verbally with patient    Supporting Documentation  Indications: pain   Procedure Details  Location: knee - R knee  Preparation: Patient was prepped and draped in the usual sterile fashion  Needle size: 22 G  Ultrasound guidance: no  Approach: anterolateral  Medications administered: 4 mL lidocaine 1 %; 40 mg triamcinolone acetonide 40 mg/mL    Patient tolerance: patient tolerated the procedure well with no immediate complications  Dressing:  Sterile dressing applied          I have personally reviewed pertinent films in PACS

## 2021-03-23 NOTE — PATIENT INSTRUCTIONS
You may use Advil (ibuprofen) 600mg every 6 hours OR Aleve (naproxen) 250-500mg every 12 hours as needed for pain and inflammation  You may also take Tylenol 500mg every 4-6 hours as needed  Check with your primary care physician to see if these medications are safe to take and to make sure they do not interfere with your other medications and medical issues  Arthritis   AMBULATORY CARE:   Arthritis  is pain or disease in one or more joints  There are many types of arthritis  Types such as rheumatoid arthritis cause inflammation in the joints  Other types wear away the cartilage between joints, such as osteoarthritis  This makes the bones of the joint rub together when you move the joint  An infection from bacteria, a virus, or a fungus can also cause arthritis  Your symptoms may be constant, or symptoms may come and go  Arthritis often gets worse over time and can cause permanent joint damage  Common signs and symptoms of arthritis:   · Pain, swelling, or stiffness in the joint    · Limited range of motion in the joint    · Warmth or redness over the joint    · Tenderness when you touch the joint    · Stiff joints in the morning that loosen with movement    · A creaking or grinding sound when you move the joint    · Fever    Call your doctor or rheumatologist if:   · You have a fever and severe joint pain or swelling  · You cannot move the affected joint  · You have severe joint pain you cannot tolerate  · You have a new or worsening rash  · Your pain or swelling does not get better with treatment  · You have questions or concerns about your condition or care  Treatment  will depend on the type of arthritis you have and if it is severe  You may need any of the following:  · Acetaminophen  decreases pain and fever  It is available without a doctor's order  Ask how much to take and how often to take it  Follow directions   Read the labels of all other medicines you are using to see if they also contain acetaminophen, or ask your doctor or pharmacist  Acetaminophen can cause liver damage if not taken correctly  Do not use more than 4 grams (4,000 milligrams) total of acetaminophen in one day  · NSAIDs , such as ibuprofen, help decrease swelling, pain, and fever  This medicine is available with or without a doctor's order  NSAIDs can cause stomach bleeding or kidney problems in certain people  If you take blood thinner medicine, always ask your healthcare provider if NSAIDs are safe for you  Always read the medicine label and follow directions  · Steroid medicine  helps reduce swelling and pain  · Prescription pain medicine  may be given  Ask your healthcare provider how to take this medicine safely  Some prescription pain medicines contain acetaminophen  Do not take other medicines that contain acetaminophen without talking to your healthcare provider  Too much acetaminophen may cause liver damage  Prescription pain medicine may cause constipation  Ask your healthcare provider how to prevent or treat constipation  · Surgery  may be needed to repair or replace a damaged joint  Manage your symptoms:   · Rest your painful joint so it can heal   Your healthcare provider may recommend crutches or a walker if the affected joint is in a leg  · Apply ice or heat to the joint  Both can help decrease swelling and pain  Ice may also help prevent tissue damage  Use an ice pack, or put crushed ice in a plastic bag  Cover it with a towel and place it on your joint for 15 to 20 minutes every hour or as directed  You can apply heat for 20 minutes every 2 hours  Heat treatment includes hot packs or heat lamps  · Elevate your joint  Elevation helps reduce swelling and pain  Raise your joint above the level of your heart as often as you can  Prop your painful joint on pillows to keep it above your heart comfortably         Manage arthritis:   · Talk to your healthcare providers about your arthritis medicines  Some medicines may only be needed when you have arthritis pain  You may need to take other medicines every day to prevent arthritis from getting worse  Your healthcare providers will help you understand all your medicines and when to take them  It is important to take the medicines as directed, even if you start to feel better  You can continue to have joint damage and inflammation even if you do not feel it  · Eat a variety of healthy foods  Healthy foods include fruits, vegetables, whole-grain breads, low-fat dairy products, beans, lean meats, and fish  Ask if you need to be on a special diet  A diet rich in calcium and vitamin D may decrease your risk of osteoporosis  Foods high in calcium include milk, cheese, broccoli, and tofu  Vitamin D may be found in meat, fish, fortified milk, cereal and bread  Ask if you need calcium or vitamin D supplements  · Go to physical or occupational therapy as directed  A physical therapist can teach you exercises to improve flexibility and range of motion  You may also be shown non-weight-bearing exercises that are safe for your joints, such as swimming  Exercise can help keep your joints flexible and reduce pain  An occupational therapist can help you learn to do your daily activities when your joints are stiff or sore  · Maintain a healthy weight  Extra weight puts increased pressure on your joints  Ask your healthcare provider what you should weigh  If you need to lose weight, he or she can help you create a weight loss program  Weight loss can help reduce pain and increase your ability to do your activities  · Wear flat or low-heeled shoes  This will help decrease pain and reduce pressure on your ankle, knee, and hip joints  · Do not smoke  Nicotine and other chemicals in cigarettes and cigars can damage your bones and joints  Ask your healthcare provider for information if you currently smoke and need help to quit   E-cigarettes or smokeless tobacco still contain nicotine  Talk to your healthcare provider before you use these products  Support devices:   · Orthotic shoes or insoles  help support your feet when you walk  · Crutches, a cane, or a walker  may help decrease your risk for falling  They also decrease stress on affected joints  · Devices to prevent falls  include raised toilet seats and bathtub bars to help you get up from sitting  Handrails can be placed in areas where you need balance and support  · Devices to help with support and rest  include splints to wear on your hands and a firm pillow while you sleep  Use a pillow that is firm enough to support your neck and head  Follow up with your healthcare provider or rheumatologist as directed:  Write down your questions so you remember to ask them during your visits  © Copyright [address] Information is for End User's use only and may not be sold, redistributed or otherwise used for commercial purposes  All illustrations and images included in CareNotes® are the copyrighted property of A D A M , Inc  or [address] [place] [address] [employer]  The above information is an  only  It is not intended as medical advice for individual conditions or treatments  Talk to your doctor, nurse or pharmacist before following any medical regimen to see if it is safe and effective for you  Steroid Joint Injection   AMBULATORY CARE:   What you need to know about steroid joint injection:  A steroid joint injection is a procedure to inject steroid medicine into a joint  Steroid medicine decreases pain and inflammation  The injection may also contain an anesthetic (numbing medicine) to decrease pain  It may be done to treat conditions such as arthritis, gout, or carpal tunnel syndrome  The injections may be given in your knee, ankle, shoulder, elbow, or wrist  Injections may also be given in your hip, toe, thumb, or finger    How to prepare for steroid joint injection:  Your healthcare provider will talk to you about how to prepare for this procedure  He will tell you what medicines to take or not take on the day of your procedure  You may need to stop taking blood thinners several days before your procedure  What will happen during steroid joint injection:  You may be given local anesthesia to numb the area where the injection will be given  With local anesthesia, you may still feel pressure during the procedure, but you should not feel any pain  Your healthcare provider may use ultrasound or fluoroscopy (a type of x-ray) to guide the needle to the right area  He will then inject the steroid into your joint  A bandage will be placed on the injection site  What will happen after steroid joint injection:  You may have redness, warmth, or sweating in your face and chest right after the steroid injection  Steroids can affect blood sugar levels  If you have diabetes, you should check your blood sugars closely in the first 24 hours after your procedure  Risks of steroid joint injection:  You may get an infection in your joint  The injection may also cause more pain during the first 24 to 36 hours  You may need more than one injection to feel pain relief  The skin near the injection site may be damaged and become discolored or indented  This can happen if the steroid is placed too close to your skin  A tendon near the injection site may rupture or a nerve can be damaged  Contact your healthcare provider if:   · You have fever or chills  · You have redness or swelling in the injection site  · You have more pain than usual in your joint for more than 72 hours  · You have questions or concerns about your condition or care  Medicines:   · Pain medicine  may be given  Ask how to take this medicine safely  · Take your medicine as directed  Contact your healthcare provider if you think your medicine is not helping or if you have side effects   Tell him or her if you are allergic to any medicine  Keep a list of the medicines, vitamins, and herbs you take  Include the amounts, and when and why you take them  Bring the list or the pill bottles to follow-up visits  Carry your medicine list with you in case of an emergency  Self-care:   · Leave the bandage on for 8 to 12 hours  Care for your wound as directed  · Rest the area  as directed  You may need to decrease weight on certain joints, such as the knee, for a period of time  Ask when you can return to your daily activities  · Elevate  your limb where the steroid injection was given  Elevate the limb above the level of your heart as often as you can  This will help decrease swelling and pain  Prop your limb on pillows or blankets to keep it elevated comfortably  · Apply ice  on your joint for 15 to 20 minutes every hour or as directed  Use an ice pack, or put crushed ice in a plastic bag  Cover it with a towel  Ice helps prevent tissue damage and decreases swelling and pain  Follow up with your healthcare provider as directed:  Write down your questions so you remember to ask them during your visits  © Copyright [address] Information is for End User's use only and may not be sold, redistributed or otherwise used for commercial purposes  All illustrations and images included in CareNotes® are the copyrighted property of [employer] M [employer] Inc  or [facility] Amber Gutierrez   The above information is an  only  It is not intended as medical advice for individual conditions or treatments  Talk to your doctor, nurse or pharmacist before following any medical regimen to see if it is safe and effective for you

## 2021-04-02 ENCOUNTER — TELEPHONE (OUTPATIENT)
Dept: OBGYN CLINIC | Facility: HOSPITAL | Age: 71
End: 2021-04-02

## 2021-04-02 NOTE — TELEPHONE ENCOUNTER
Dr Candis Gonzalez    Patient called to inform Dr Candis Gonzalez that she's feeling better after the injection  Nothing needed at this time        # [phone]

## 2021-05-13 ENCOUNTER — RA CDI HCC (OUTPATIENT)
Dept: OTHER | Facility: HOSPITAL | Age: 71
End: 2021-05-13

## 2021-05-13 NOTE — PROGRESS NOTES
[facility]  coding opportunities          Chart reviewed, no opportunity found: CHART REVIEWED, NO OPPORTUNITY FOUND              Patients insurance company: [employer] only)

## 2021-05-14 ENCOUNTER — APPOINTMENT (OUTPATIENT)
Dept: LAB | Facility: MEDICAL CENTER | Age: 71
End: 2021-05-14
Payer: COMMERCIAL

## 2021-05-14 DIAGNOSIS — I10 ESSENTIAL HYPERTENSION: ICD-10-CM

## 2021-05-14 DIAGNOSIS — E55.9 VITAMIN D DEFICIENCY: ICD-10-CM

## 2021-05-14 LAB
25(OH)D3 SERPL-MCNC: 54.8 NG/ML (ref 30–100)
ALBUMIN SERPL BCP-MCNC: 4 G/DL (ref 3.5–5)
ALP SERPL-CCNC: 100 U/L (ref 46–116)
ALT SERPL W P-5'-P-CCNC: 17 U/L (ref 12–78)
ANION GAP SERPL CALCULATED.3IONS-SCNC: 7 MMOL/L (ref 4–13)
AST SERPL W P-5'-P-CCNC: 9 U/L (ref 5–45)
BASOPHILS # BLD AUTO: 0.05 THOUSANDS/ΜL (ref 0–0.1)
BASOPHILS NFR BLD AUTO: 1 % (ref 0–1)
BILIRUB SERPL-MCNC: 0.5 MG/DL (ref 0.2–1)
BUN SERPL-MCNC: 19 MG/DL (ref 5–25)
CALCIUM SERPL-MCNC: 9.5 MG/DL (ref 8.3–10.1)
CHLORIDE SERPL-SCNC: 106 MMOL/L (ref 100–108)
CHOLEST SERPL-MCNC: 157 MG/DL (ref 50–200)
CO2 SERPL-SCNC: 29 MMOL/L (ref 21–32)
CREAT SERPL-MCNC: 0.7 MG/DL (ref 0.6–1.3)
EOSINOPHIL # BLD AUTO: 0.1 THOUSAND/ΜL (ref 0–0.61)
EOSINOPHIL NFR BLD AUTO: 2 % (ref 0–6)
ERYTHROCYTE [DISTWIDTH] IN BLOOD BY AUTOMATED COUNT: 12 % (ref 11.6–15.1)
GFR SERPL CREATININE-BSD FRML MDRD: 87 ML/MIN/1.73SQ M
GLUCOSE P FAST SERPL-MCNC: 98 MG/DL (ref 65–99)
HCT VFR BLD AUTO: 45 % (ref 34.8–46.1)
HDLC SERPL-MCNC: 74 MG/DL
HGB BLD-MCNC: 14.4 G/DL (ref 11.5–15.4)
IMM GRANULOCYTES # BLD AUTO: 0.02 THOUSAND/UL (ref 0–0.2)
IMM GRANULOCYTES NFR BLD AUTO: 0 % (ref 0–2)
LDLC SERPL CALC-MCNC: 56 MG/DL (ref 0–100)
LYMPHOCYTES # BLD AUTO: 2.13 THOUSANDS/ΜL (ref 0.6–4.47)
LYMPHOCYTES NFR BLD AUTO: 40 % (ref 14–44)
MCH RBC QN AUTO: 32.5 PG (ref 26.8–34.3)
MCHC RBC AUTO-ENTMCNC: 32 G/DL (ref 31.4–37.4)
MCV RBC AUTO: 102 FL (ref 82–98)
MONOCYTES # BLD AUTO: 0.51 THOUSAND/ΜL (ref 0.17–1.22)
MONOCYTES NFR BLD AUTO: 10 % (ref 4–12)
NEUTROPHILS # BLD AUTO: 2.49 THOUSANDS/ΜL (ref 1.85–7.62)
NEUTS SEG NFR BLD AUTO: 47 % (ref 43–75)
NONHDLC SERPL-MCNC: 83 MG/DL
NRBC BLD AUTO-RTO: 0 /100 WBCS
PLATELET # BLD AUTO: 224 THOUSANDS/UL (ref 149–390)
PMV BLD AUTO: 11.6 FL (ref 8.9–12.7)
POTASSIUM SERPL-SCNC: 3.7 MMOL/L (ref 3.5–5.3)
PROT SERPL-MCNC: 7.5 G/DL (ref 6.4–8.2)
RBC # BLD AUTO: 4.43 MILLION/UL (ref 3.81–5.12)
SODIUM SERPL-SCNC: 142 MMOL/L (ref 136–145)
TRIGL SERPL-MCNC: 134 MG/DL
WBC # BLD AUTO: 5.3 THOUSAND/UL (ref 4.31–10.16)

## 2021-05-14 PROCEDURE — 85025 COMPLETE CBC W/AUTO DIFF WBC: CPT

## 2021-05-14 PROCEDURE — 80053 COMPREHEN METABOLIC PANEL: CPT

## 2021-05-14 PROCEDURE — 80061 LIPID PANEL: CPT

## 2021-05-14 PROCEDURE — 82306 VITAMIN D 25 HYDROXY: CPT

## 2021-05-14 PROCEDURE — 36415 COLL VENOUS BLD VENIPUNCTURE: CPT

## 2021-05-19 ENCOUNTER — OFFICE VISIT (OUTPATIENT)
Dept: FAMILY MEDICINE CLINIC | Facility: CLINIC | Age: 71
End: 2021-05-19
Payer: COMMERCIAL

## 2021-05-19 VITALS
OXYGEN SATURATION: 97 % | HEIGHT: 63 IN | WEIGHT: 156.4 LBS | HEART RATE: 88 BPM | TEMPERATURE: 98.1 F | SYSTOLIC BLOOD PRESSURE: 134 MMHG | BODY MASS INDEX: 27.71 KG/M2 | DIASTOLIC BLOOD PRESSURE: 82 MMHG

## 2021-05-19 DIAGNOSIS — M81.0 OSTEOPOROSIS, POSTMENOPAUSAL: ICD-10-CM

## 2021-05-19 DIAGNOSIS — E55.9 VITAMIN D DEFICIENCY: Primary | ICD-10-CM

## 2021-05-19 DIAGNOSIS — M25.561 ARTHRALGIA OF RIGHT KNEE: ICD-10-CM

## 2021-05-19 DIAGNOSIS — M17.11 PRIMARY OSTEOARTHRITIS OF RIGHT KNEE: ICD-10-CM

## 2021-05-19 DIAGNOSIS — I10 ESSENTIAL HYPERTENSION: ICD-10-CM

## 2021-05-19 DIAGNOSIS — R09.89 CHEST CONGESTION: ICD-10-CM

## 2021-05-19 PROCEDURE — 1036F TOBACCO NON-USER: CPT | Performed by: FAMILY MEDICINE

## 2021-05-19 PROCEDURE — 99214 OFFICE O/P EST MOD 30 MIN: CPT | Performed by: FAMILY MEDICINE

## 2021-05-19 PROCEDURE — 3008F BODY MASS INDEX DOCD: CPT | Performed by: FAMILY MEDICINE

## 2021-05-19 PROCEDURE — 3075F SYST BP GE 130 - 139MM HG: CPT | Performed by: FAMILY MEDICINE

## 2021-05-19 PROCEDURE — 1160F RVW MEDS BY RX/DR IN RCRD: CPT | Performed by: FAMILY MEDICINE

## 2021-05-19 NOTE — PROGRESS NOTES
BMI Counseling: Body mass index is 27 71 kg/m²  The BMI is above normal  Nutrition recommendations include decreasing portion sizes, encouraging healthy choices of fruits and vegetables, consuming healthier snacks, limiting drinks that contain sugar, moderation in carbohydrate intake and increasing intake of lean protein  Exercise recommendations include moderate physical activity 150 minutes/week, exercising 3-5 times per week and strength training exercises  No pharmacotherapy was ordered  Patient referred to PCP due to patient being overweight  Assessment/Plan:    1  Vitamin D deficiency    2  Essential hypertension    3  Arthralgia of right knee    4  Primary osteoarthritis of right knee    5  Osteoporosis, postmenopausal  Comments:  s/p hip fracture, vit D adeq with 5000 IU per day    6  Chest congestion  Comments:  early morning, late night sx, declines inhaler for now, consdier CXR if worsening        There are no Patient Instructions on file for this visit  Return in about 6 months (around 11/19/2021)  Subjective:      Patient ID: Solomon Talbot is a 65 y o  female  Chief Complaint   Patient presents with    Follow-up       Here for f/u  received COVID series  BP controlled  No ankle swelling with the 10mg dose amlodipine  Labs reviewed  Lipids at goal, all normal  Early morning, late night sx for allergies, chest congestions, able to expectorate, using mucinex as needed  The following portions of the patient's history were reviewed and updated as appropriate: allergies, current medications, past family history, past medical history, past social history, past surgical history and problem list     Review of Systems   Constitutional: Negative for fatigue and fever  HENT: Negative for congestion  Eyes: Negative for visual disturbance  Respiratory: Negative for chest tightness and shortness of breath  Cardiovascular: Negative for chest pain and palpitations  Gastrointestinal: Negative for abdominal pain  Genitourinary: Negative for difficulty urinating  Musculoskeletal: Negative for arthralgias  Neurological: Negative for headaches  Hematological: Does not bruise/bleed easily  Current Outpatient Medications   Medication Sig Dispense Refill    amLODIPine (NORVASC) 10 mg tablet Take 1 tablet (10 mg total) by mouth daily For blood pressure 90 tablet 1    Cholecalciferol (VITAMIN D-3) 5000 units TABS Take 1 capsule by mouth daily      losartan-hydrochlorothiazide (HYZAAR) 50-12 5 mg per tablet Take 1 tablet by mouth daily 90 tablet 1     No current facility-administered medications for this visit  Objective:    /82 (BP Location: Right arm, Patient Position: Sitting, Cuff Size: Standard)   Pulse 88   Temp 98 1 °F (36 7 °C)   Ht 5' 3" (1 6 m)   Wt 70 9 kg (156 lb 6 4 oz)   SpO2 97%   BMI 27 71 kg/m²        Physical Exam  Vitals signs reviewed  Constitutional:       General: She is not in acute distress  Appearance: Normal appearance  She is well-developed  She is not ill-appearing  Cardiovascular:      Rate and Rhythm: Normal rate and regular rhythm  Heart sounds: Normal heart sounds  Pulmonary:      Effort: Pulmonary effort is normal  No respiratory distress  Breath sounds: No stridor  Wheezing present  Skin:     General: Skin is warm  Neurological:      Mental Status: She is alert and oriented to person, place, and time  Psychiatric:         Mood and Affect: Mood normal          Behavior: Behavior normal          Thought Content:  Thought content normal          Judgment: Judgment normal                 Abby Reddy MD

## 2021-07-07 DIAGNOSIS — I10 ESSENTIAL HYPERTENSION: ICD-10-CM

## 2021-07-07 RX ORDER — AMLODIPINE BESYLATE 10 MG/1
TABLET ORAL
Qty: 90 TABLET | Refills: 1 | Status: SHIPPED | OUTPATIENT
Start: 2021-07-07 | End: 2021-11-26

## 2021-07-07 RX ORDER — LOSARTAN POTASSIUM AND HYDROCHLOROTHIAZIDE 12.5; 5 MG/1; MG/1
TABLET ORAL
Qty: 90 TABLET | Refills: 1 | Status: SHIPPED | OUTPATIENT
Start: 2021-07-07 | End: 2021-11-26

## 2021-11-09 ENCOUNTER — RA CDI HCC (OUTPATIENT)
Dept: OTHER | Facility: HOSPITAL | Age: 71
End: 2021-11-09

## 2021-11-22 ENCOUNTER — OFFICE VISIT (OUTPATIENT)
Dept: FAMILY MEDICINE CLINIC | Facility: CLINIC | Age: 71
End: 2021-11-22
Payer: COMMERCIAL

## 2021-11-22 VITALS
HEIGHT: 63 IN | TEMPERATURE: 98 F | OXYGEN SATURATION: 96 % | BODY MASS INDEX: 27.25 KG/M2 | SYSTOLIC BLOOD PRESSURE: 146 MMHG | WEIGHT: 153.8 LBS | HEART RATE: 84 BPM | DIASTOLIC BLOOD PRESSURE: 80 MMHG

## 2021-11-22 DIAGNOSIS — Z00.00 MEDICARE ANNUAL WELLNESS VISIT, SUBSEQUENT: ICD-10-CM

## 2021-11-22 DIAGNOSIS — I10 ESSENTIAL HYPERTENSION: Primary | ICD-10-CM

## 2021-11-22 PROCEDURE — 3079F DIAST BP 80-89 MM HG: CPT | Performed by: FAMILY MEDICINE

## 2021-11-22 PROCEDURE — G0439 PPPS, SUBSEQ VISIT: HCPCS | Performed by: FAMILY MEDICINE

## 2021-11-22 PROCEDURE — 1036F TOBACCO NON-USER: CPT | Performed by: FAMILY MEDICINE

## 2021-11-22 PROCEDURE — 3008F BODY MASS INDEX DOCD: CPT | Performed by: FAMILY MEDICINE

## 2021-11-22 PROCEDURE — 3288F FALL RISK ASSESSMENT DOCD: CPT | Performed by: FAMILY MEDICINE

## 2021-11-22 PROCEDURE — 1170F FXNL STATUS ASSESSED: CPT | Performed by: FAMILY MEDICINE

## 2021-11-22 PROCEDURE — 99213 OFFICE O/P EST LOW 20 MIN: CPT | Performed by: FAMILY MEDICINE

## 2021-11-22 PROCEDURE — 1160F RVW MEDS BY RX/DR IN RCRD: CPT | Performed by: FAMILY MEDICINE

## 2021-11-22 PROCEDURE — 1125F AMNT PAIN NOTED PAIN PRSNT: CPT | Performed by: FAMILY MEDICINE

## 2021-11-22 PROCEDURE — 3077F SYST BP >= 140 MM HG: CPT | Performed by: FAMILY MEDICINE

## 2021-11-24 DIAGNOSIS — I10 ESSENTIAL HYPERTENSION: ICD-10-CM

## 2021-11-26 RX ORDER — AMLODIPINE BESYLATE 10 MG/1
TABLET ORAL
Qty: 90 TABLET | Refills: 1 | Status: SHIPPED | OUTPATIENT
Start: 2021-11-26 | End: 2022-04-25

## 2021-11-26 RX ORDER — LOSARTAN POTASSIUM AND HYDROCHLOROTHIAZIDE 12.5; 5 MG/1; MG/1
TABLET ORAL
Qty: 90 TABLET | Refills: 1 | Status: SHIPPED | OUTPATIENT
Start: 2021-11-26 | End: 2022-04-25

## 2022-03-23 ENCOUNTER — APPOINTMENT (OUTPATIENT)
Dept: LAB | Facility: MEDICAL CENTER | Age: 72
End: 2022-03-23
Payer: COMMERCIAL

## 2022-03-23 DIAGNOSIS — I10 ESSENTIAL HYPERTENSION: ICD-10-CM

## 2022-03-23 LAB
ALBUMIN SERPL BCP-MCNC: 4 G/DL (ref 3.5–5)
ALP SERPL-CCNC: 103 U/L (ref 46–116)
ALT SERPL W P-5'-P-CCNC: 21 U/L (ref 12–78)
ANION GAP SERPL CALCULATED.3IONS-SCNC: 2 MMOL/L (ref 4–13)
AST SERPL W P-5'-P-CCNC: 10 U/L (ref 5–45)
BASOPHILS # BLD AUTO: 0.04 THOUSANDS/ΜL (ref 0–0.1)
BASOPHILS NFR BLD AUTO: 1 % (ref 0–1)
BILIRUB SERPL-MCNC: 0.54 MG/DL (ref 0.2–1)
BUN SERPL-MCNC: 18 MG/DL (ref 5–25)
CALCIUM SERPL-MCNC: 9.5 MG/DL (ref 8.3–10.1)
CHLORIDE SERPL-SCNC: 104 MMOL/L (ref 100–108)
CHOLEST SERPL-MCNC: 163 MG/DL
CO2 SERPL-SCNC: 34 MMOL/L (ref 21–32)
CREAT SERPL-MCNC: 0.79 MG/DL (ref 0.6–1.3)
EOSINOPHIL # BLD AUTO: 0.11 THOUSAND/ΜL (ref 0–0.61)
EOSINOPHIL NFR BLD AUTO: 2 % (ref 0–6)
ERYTHROCYTE [DISTWIDTH] IN BLOOD BY AUTOMATED COUNT: 12 % (ref 11.6–15.1)
GFR SERPL CREATININE-BSD FRML MDRD: 74 ML/MIN/1.73SQ M
GLUCOSE P FAST SERPL-MCNC: 101 MG/DL (ref 65–99)
HCT VFR BLD AUTO: 47.3 % (ref 34.8–46.1)
HDLC SERPL-MCNC: 67 MG/DL
HGB BLD-MCNC: 15.5 G/DL (ref 11.5–15.4)
IMM GRANULOCYTES # BLD AUTO: 0.01 THOUSAND/UL (ref 0–0.2)
IMM GRANULOCYTES NFR BLD AUTO: 0 % (ref 0–2)
LDLC SERPL CALC-MCNC: 69 MG/DL (ref 0–100)
LYMPHOCYTES # BLD AUTO: 1.73 THOUSANDS/ΜL (ref 0.6–4.47)
LYMPHOCYTES NFR BLD AUTO: 36 % (ref 14–44)
MCH RBC QN AUTO: 32.6 PG (ref 26.8–34.3)
MCHC RBC AUTO-ENTMCNC: 32.8 G/DL (ref 31.4–37.4)
MCV RBC AUTO: 99 FL (ref 82–98)
MONOCYTES # BLD AUTO: 0.46 THOUSAND/ΜL (ref 0.17–1.22)
MONOCYTES NFR BLD AUTO: 10 % (ref 4–12)
NEUTROPHILS # BLD AUTO: 2.4 THOUSANDS/ΜL (ref 1.85–7.62)
NEUTS SEG NFR BLD AUTO: 51 % (ref 43–75)
NONHDLC SERPL-MCNC: 96 MG/DL
NRBC BLD AUTO-RTO: 0 /100 WBCS
PLATELET # BLD AUTO: 213 THOUSANDS/UL (ref 149–390)
PMV BLD AUTO: 11.4 FL (ref 8.9–12.7)
POTASSIUM SERPL-SCNC: 3.9 MMOL/L (ref 3.5–5.3)
PROT SERPL-MCNC: 7.2 G/DL (ref 6.4–8.2)
RBC # BLD AUTO: 4.76 MILLION/UL (ref 3.81–5.12)
SODIUM SERPL-SCNC: 140 MMOL/L (ref 136–145)
TRIGL SERPL-MCNC: 136 MG/DL
WBC # BLD AUTO: 4.75 THOUSAND/UL (ref 4.31–10.16)

## 2022-03-23 PROCEDURE — 85025 COMPLETE CBC W/AUTO DIFF WBC: CPT

## 2022-03-23 PROCEDURE — 36415 COLL VENOUS BLD VENIPUNCTURE: CPT

## 2022-03-23 PROCEDURE — 80053 COMPREHEN METABOLIC PANEL: CPT

## 2022-03-23 PROCEDURE — 80061 LIPID PANEL: CPT

## 2022-04-25 DIAGNOSIS — I10 ESSENTIAL HYPERTENSION: ICD-10-CM

## 2022-04-25 RX ORDER — AMLODIPINE BESYLATE 10 MG/1
TABLET ORAL
Qty: 90 TABLET | Refills: 1 | Status: SHIPPED | OUTPATIENT
Start: 2022-04-25

## 2022-04-25 RX ORDER — LOSARTAN POTASSIUM AND HYDROCHLOROTHIAZIDE 12.5; 5 MG/1; MG/1
TABLET ORAL
Qty: 90 TABLET | Refills: 1 | Status: SHIPPED | OUTPATIENT
Start: 2022-04-25

## 2022-05-18 ENCOUNTER — RA CDI HCC (OUTPATIENT)
Dept: OTHER | Facility: HOSPITAL | Age: 72
End: 2022-05-18

## 2022-05-18 NOTE — PROGRESS NOTES
[facility] 75  coding opportunities       Chart reviewed, no opportunity found:   [address]        Patients Insurance     Medicare Insurance: [facility]

## 2022-05-25 ENCOUNTER — OFFICE VISIT (OUTPATIENT)
Dept: FAMILY MEDICINE CLINIC | Facility: CLINIC | Age: 72
End: 2022-05-25
Payer: COMMERCIAL

## 2022-05-25 VITALS
TEMPERATURE: 97.5 F | HEART RATE: 80 BPM | OXYGEN SATURATION: 97 % | SYSTOLIC BLOOD PRESSURE: 144 MMHG | DIASTOLIC BLOOD PRESSURE: 82 MMHG | BODY MASS INDEX: 26.26 KG/M2 | WEIGHT: 148.2 LBS | HEIGHT: 63 IN

## 2022-05-25 DIAGNOSIS — R73.9 BORDERLINE HYPERGLYCEMIA: ICD-10-CM

## 2022-05-25 DIAGNOSIS — I10 ESSENTIAL HYPERTENSION: Primary | ICD-10-CM

## 2022-05-25 DIAGNOSIS — E55.9 VITAMIN D DEFICIENCY: ICD-10-CM

## 2022-05-25 DIAGNOSIS — M25.561 ARTHRALGIA OF RIGHT KNEE: ICD-10-CM

## 2022-05-25 PROCEDURE — 3079F DIAST BP 80-89 MM HG: CPT | Performed by: FAMILY MEDICINE

## 2022-05-25 PROCEDURE — 3077F SYST BP >= 140 MM HG: CPT | Performed by: FAMILY MEDICINE

## 2022-05-25 PROCEDURE — 99214 OFFICE O/P EST MOD 30 MIN: CPT | Performed by: FAMILY MEDICINE

## 2022-05-25 PROCEDURE — 1160F RVW MEDS BY RX/DR IN RCRD: CPT | Performed by: FAMILY MEDICINE

## 2022-05-25 PROCEDURE — 3008F BODY MASS INDEX DOCD: CPT | Performed by: FAMILY MEDICINE

## 2022-05-25 PROCEDURE — 1036F TOBACCO NON-USER: CPT | Performed by: FAMILY MEDICINE

## 2022-05-25 NOTE — PROGRESS NOTES
Assessment/Plan:    1  Essential hypertension  Comments:  continue current regimen    2  Vitamin D deficiency    3  Arthralgia of right knee    4  Borderline hyperglycemia  Comments:  check A1C at next round of labs, stay active, watch carbs        There are no Patient Instructions on file for this visit  Return in about 6 months (around 11/25/2022)  Subjective:      Patient ID: Carey Cabral is a 66 y o  female  Chief Complaint   Patient presents with    Follow-up       Here for f/u  Trying eat healthy with rising cost of foods  BP stays in the 140/80's with current regimen  Labs good, lipids at goal  Declines colon, mammo  Still smoking a few cigs per day  R knee pain does not slow her down  Tries to stay active  Labs covered once a year  The following portions of the patient's history were reviewed and updated as appropriate: allergies, current medications, past family history, past medical history, past social history, past surgical history and problem list     Review of Systems   Constitutional: Negative for fatigue and fever  HENT: Negative for congestion  Eyes: Negative for visual disturbance  Respiratory: Positive for cough (prod cough in the AM)  Negative for chest tightness, shortness of breath and wheezing  Cardiovascular: Negative for chest pain and palpitations  Gastrointestinal: Negative for abdominal pain  Genitourinary: Negative for difficulty urinating  Musculoskeletal: Negative for arthralgias  Neurological: Negative for headaches  Hematological: Does not bruise/bleed easily           Current Outpatient Medications   Medication Sig Dispense Refill    amLODIPine (NORVASC) 10 mg tablet TAKE 1 TABLET EVERY DAY FOR BLOOD PRESSURE 90 tablet 1    Cholecalciferol (VITAMIN D-3) 5000 units TABS Take 1 capsule by mouth daily      losartan-hydrochlorothiazide (HYZAAR) 50-12 5 mg per tablet TAKE 1 TABLET EVERY DAY 90 tablet 1     No current facility-administered medications for this visit  Objective:    /82 (BP Location: Right arm, Patient Position: Sitting, Cuff Size: Standard)   Pulse 80   Temp 97 5 °F (36 4 °C)   Ht 5' 3" (1 6 m)   Wt 67 2 kg (148 lb 3 2 oz)   SpO2 97%   BMI 26 25 kg/m²        Physical Exam  Vitals reviewed  Constitutional:       Appearance: Normal appearance  She is well-developed  Cardiovascular:      Rate and Rhythm: Normal rate and regular rhythm  Heart sounds: Normal heart sounds  Pulmonary:      Effort: Pulmonary effort is normal       Breath sounds: Rhonchi (scattered) present  Skin:     General: Skin is warm  Neurological:      Mental Status: She is alert and oriented to person, place, and time  Psychiatric:         Mood and Affect: Mood normal          Behavior: Behavior normal          Thought Content: Thought content normal          Judgment: Judgment normal             BMI Counseling: Body mass index is 26 25 kg/m²  The BMI is above normal  Nutrition recommendations include decreasing portion sizes, encouraging healthy choices of fruits and vegetables, consuming healthier snacks, limiting drinks that contain sugar and moderation in carbohydrate intake  Exercise recommendations include moderate physical activity 150 minutes/week, exercising 3-5 times per week and strength training exercises  No pharmacotherapy was ordered  Patient referred to PCP  Rationale for BMI follow-up plan is due to patient being overweight or obese         Hi Parish MD

## 2022-11-28 ENCOUNTER — OFFICE VISIT (OUTPATIENT)
Dept: FAMILY MEDICINE CLINIC | Facility: CLINIC | Age: 72
End: 2022-11-28

## 2022-11-28 VITALS
HEIGHT: 63 IN | TEMPERATURE: 97.6 F | WEIGHT: 145.8 LBS | SYSTOLIC BLOOD PRESSURE: 156 MMHG | BODY MASS INDEX: 25.83 KG/M2 | OXYGEN SATURATION: 97 % | DIASTOLIC BLOOD PRESSURE: 84 MMHG | HEART RATE: 88 BPM

## 2022-11-28 DIAGNOSIS — E55.9 VITAMIN D DEFICIENCY: ICD-10-CM

## 2022-11-28 DIAGNOSIS — I10 ESSENTIAL HYPERTENSION: Primary | ICD-10-CM

## 2022-11-28 DIAGNOSIS — Z00.00 MEDICARE ANNUAL WELLNESS VISIT, SUBSEQUENT: ICD-10-CM

## 2022-11-28 NOTE — PROGRESS NOTES
Assessment and Plan:     Problem List Items Addressed This Visit        Cardiovascular and Mediastinum    Essential hypertension - Primary    Relevant Orders    CBC and differential    Comprehensive metabolic panel    Lipid panel       Other    Vitamin D deficiency    Relevant Orders    Vitamin D 25 hydroxy   Other Visit Diagnoses     Medicare annual wellness visit, subsequent            BMI Counseling: Body mass index is 25 83 kg/m²  The BMI is above normal  Nutrition recommendations include decreasing portion sizes, decreasing fast food intake, consuming healthier snacks, limiting drinks that contain sugar and moderation in carbohydrate intake  Exercise recommendations include moderate physical activity 150 minutes/week, exercising 3-5 times per week and strength training exercises  No pharmacotherapy was ordered  Patient referred to PCP  Rationale for BMI follow-up plan is due to patient being overweight or obese  Depression Screening and Follow-up Plan: Patient was screened for depression during today's encounter  They screened negative with a PHQ-2 score of 0  Preventive health issues were discussed with patient, and age appropriate screening tests were ordered as noted in patient's After Visit Summary  Personalized health advice and appropriate referrals for health education or preventive services given if needed, as noted in patient's After Visit Summary  History of Present Illness:     Patient presents for a Medicare Wellness Visit    Here for f/u  BP stable, runs 140-150/80-90's  Down to 1-4 cigs per day  Cannot get flu vaccine  No problems today  Patient Care Team:  Juan Mueller MD as PCP - General     Review of Systems:     Review of Systems   Constitutional: Negative for fatigue and fever  HENT: Negative for congestion  Eyes: Negative for visual disturbance  Respiratory: Negative for chest tightness and shortness of breath      Cardiovascular: Negative for chest pain and palpitations  Gastrointestinal: Negative for abdominal pain  Genitourinary: Negative for difficulty urinating  Musculoskeletal: Negative for arthralgias  Neurological: Negative for headaches  Hematological: Does not bruise/bleed easily  Psychiatric/Behavioral: Negative for dysphoric mood  The patient is nervous/anxious           Problem List:     Patient Active Problem List   Diagnosis   • Essential hypertension   • Arthralgia of right knee   • Osteoarthritis of knee   • Osteoporosis, postmenopausal   • Pes anserine bursitis   • Vitamin D deficiency      Past Medical and Surgical History:     Past Medical History:   Diagnosis Date   • Hypertension    • Shin splints     last assessed 03/27/17   • Symptomatic menopausal or female climacteric states     last assessed 08/26/13     Past Surgical History:   Procedure Laterality Date   • APPENDECTOMY     • [place] OPEN RX FEMUR FX+INTRAMED KJ Left 3/23/2018    Procedure: LEFT HIP LONG TROCHANTERIC FEMORAL NAIL;  Surgeon: Lien Parish MD;  Location: [facility];  Service: Orthopedics   • TOTAL ABDOMINAL HYSTERECTOMY        Family History:     Family History   Problem Relation Age of Onset   • No Known Problems Mother    • Heart disease Father         CABG three or more nonautogenous grafts   • Heart attack Brother       Social History:     Social History     Socioeconomic History   • Marital status: /Civil Union     Spouse name: None   • Number of children: None   • Years of education: None   • Highest education level: None   Occupational History   • None   Tobacco Use   • Smoking status: Former     Types: Cigarettes   • Smokeless tobacco: Never   • Tobacco comments:     rarely has a cigarette   Substance and Sexual Activity   • Alcohol use: No   • Drug use: No   • Sexual activity: None   Other Topics Concern   • None   Social History Narrative   • None     Social Determinants of Health     Financial Resource Strain: Low Risk    • Difficulty of Paying Living Expenses: Not very hard   Food Insecurity: Not on file   Transportation Needs: No Transportation Needs   • Lack of Transportation (Medical): No   • Lack of Transportation (Non-Medical): No   Physical Activity: Not on file   Stress: Not on file   Social Connections: Not on file   Intimate Partner Violence: Not on file   Housing Stability: Not on file      Medications and Allergies:     Current Outpatient Medications   Medication Sig Dispense Refill   • amLODIPine (NORVASC) 10 mg tablet TAKE 1 TABLET EVERY DAY FOR BLOOD PRESSURE 90 tablet 1   • Cholecalciferol (VITAMIN D-3) 5000 units TABS Take 1 capsule by mouth daily     • losartan-hydrochlorothiazide (HYZAAR) 50-12 5 mg per tablet TAKE 1 TABLET EVERY DAY 90 tablet 1     No current facility-administered medications for this visit  Allergies   Allergen Reactions   • Fluzone Quadrivalent  [Influenza Vac Split Quad]       Immunizations:     Immunization History   Administered Date(s) Administered   • COVID-19 MODERNA VACC 0 5 ML IM 03/25/2021, 04/22/2021      Health Maintenance:         Topic Date Due   • Hepatitis C Screening  Never done   • DXA SCAN  Never done   • Breast Cancer Screening: Mammogram  Never done   • Colorectal Cancer Screening  Never done         Topic Date Due   • Hepatitis B Vaccine (1 of 3 - 3-dose series) Never done   • Pneumococcal Vaccine: 65+ Years (1 - PCV) Never done   • COVID-19 Vaccine (3 - Booster for Moderna series) 09/22/2021   • Influenza Vaccine (1) Never done      Medicare Screening Tests and Risk Assessments:     Gin Dupree is here for her Subsequent Wellness visit  Last Medicare Wellness visit information reviewed, patient interviewed and updates made to the record today  Health Risk Assessment:   Patient rates overall health as good  Patient feels that their physical health rating is same  Patient is satisfied with their life  Eyesight was rated as same  Hearing was rated as same   Patient feels that their emotional and mental health rating is same  Patients states they are never, rarely angry  Patient states they are never, rarely unusually tired/fatigued  Pain experienced in the last 7 days has been none  Patient states that she has experienced no weight loss or gain in last 6 months  Depression Screening:   PHQ-2 Score: 0  PHQ-9 Score: 0      Fall Risk Screening: In the past year, patient has experienced: no history of falling in past year      Urinary Incontinence Screening:   Patient has not leaked urine accidently in the last six months  Home Safety:  Patient does not have trouble with stairs inside or outside of their home  Patient has working smoke alarms and has no working carbon monoxide detector  Home safety hazards include: none  Nutrition:   Current diet is Regular  Medications:   Patient is currently taking over-the-counter supplements  OTC medications include: see medication list  Patient is able to manage medications  Activities of Daily Living (ADLs)/Instrumental Activities of Daily Living (IADLs):   Walk and transfer into and out of bed and chair?: Yes  Dress and groom yourself?: Yes    Bathe or shower yourself?: Yes    Feed yourself?  Yes  Do your laundry/housekeeping?: Yes  Manage your money, pay your bills and track your expenses?: Yes  Make your own meals?: Yes    Do your own shopping?: Yes    Previous Hospitalizations:   Any hospitalizations or ED visits within the last 12 months?: No      Advance Care Planning:   Living will: No    Advanced directive: No      Cognitive Screening:   Provider or family/friend/caregiver concerned regarding cognition?: No    PREVENTIVE SCREENINGS      Cardiovascular Screening:    General: Screening Current      Diabetes Screening:     General: Screening Current      Colorectal Cancer Screening:     General: Patient Declines      Breast Cancer Screening:     General: Patient Declines      Cervical Cancer Screening:    General: Screening Not Indicated Osteoporosis Screening:    General: Screening Not Indicated and History Osteoporosis      Abdominal Aortic Aneurysm (AAA) Screening:        General: Screening Not Indicated      Lung Cancer Screening:     General: Screening Not Indicated      Hepatitis C Screening:    General: Screening Not Indicated    Screening, Brief Intervention, and Referral to Treatment (SBIRT)    Screening  Typical number of drinks in a day: 0  Typical number of drinks in a week: 0  Interpretation: Low risk drinking behavior  Single Item Drug Screening:  How often have you used an illegal drug (including marijuana) or a prescription medication for non-medical reasons in the past year? never    Single Item Drug Screen Score: 0  Interpretation: Negative screen for possible drug use disorder    No results found  Physical Exam:     /84 (BP Location: Right arm, Patient Position: Sitting, Cuff Size: Standard)   Pulse 88   Temp 97 6 °F (36 4 °C)   Ht 5' 3" (1 6 m)   Wt 66 1 kg (145 lb 12 8 oz)   SpO2 97%   BMI 25 83 kg/m²     Physical Exam  Vitals reviewed  Constitutional:       Appearance: Normal appearance  She is well-developed and well-nourished  Cardiovascular:      Rate and Rhythm: Normal rate and regular rhythm  Heart sounds: Normal heart sounds  Pulmonary:      Effort: Pulmonary effort is normal       Breath sounds: Normal breath sounds  Musculoskeletal:      Cervical back: Normal range of motion and neck supple  Neurological:      Mental Status: She is alert and oriented to person, place, and time  Psychiatric:         Mood and Affect: Mood and affect and mood normal          Behavior: Behavior normal          Thought Content:  Thought content normal          Judgment: Judgment normal           Shekhar Arauz MD

## 2022-12-07 DIAGNOSIS — I10 ESSENTIAL HYPERTENSION: ICD-10-CM

## 2022-12-07 RX ORDER — LOSARTAN POTASSIUM AND HYDROCHLOROTHIAZIDE 12.5; 5 MG/1; MG/1
TABLET ORAL
Qty: 90 TABLET | Refills: 1 | Status: SHIPPED | OUTPATIENT
Start: 2022-12-07

## 2022-12-07 RX ORDER — AMLODIPINE BESYLATE 10 MG/1
TABLET ORAL
Qty: 90 TABLET | Refills: 1 | Status: SHIPPED | OUTPATIENT
Start: 2022-12-07

## 2023-03-16 ENCOUNTER — APPOINTMENT (OUTPATIENT)
Dept: LAB | Facility: MEDICAL CENTER | Age: 73
End: 2023-03-16

## 2023-03-16 DIAGNOSIS — E55.9 VITAMIN D DEFICIENCY: ICD-10-CM

## 2023-03-16 DIAGNOSIS — I10 ESSENTIAL HYPERTENSION: ICD-10-CM

## 2023-03-16 LAB
25(OH)D3 SERPL-MCNC: 55.6 NG/ML (ref 30–100)
ALBUMIN SERPL BCP-MCNC: 4.2 G/DL (ref 3.5–5)
ALP SERPL-CCNC: 90 U/L (ref 46–116)
ALT SERPL W P-5'-P-CCNC: 21 U/L (ref 12–78)
ANION GAP SERPL CALCULATED.3IONS-SCNC: 2 MMOL/L (ref 4–13)
AST SERPL W P-5'-P-CCNC: 15 U/L (ref 5–45)
BASOPHILS # BLD AUTO: 0.05 THOUSANDS/ÂΜL (ref 0–0.1)
BASOPHILS NFR BLD AUTO: 1 % (ref 0–1)
BILIRUB SERPL-MCNC: 0.56 MG/DL (ref 0.2–1)
BUN SERPL-MCNC: 18 MG/DL (ref 5–25)
CALCIUM SERPL-MCNC: 9.5 MG/DL (ref 8.3–10.1)
CHLORIDE SERPL-SCNC: 101 MMOL/L (ref 96–108)
CHOLEST SERPL-MCNC: 183 MG/DL
CO2 SERPL-SCNC: 33 MMOL/L (ref 21–32)
CREAT SERPL-MCNC: 0.74 MG/DL (ref 0.6–1.3)
EOSINOPHIL # BLD AUTO: 0.14 THOUSAND/ÂΜL (ref 0–0.61)
EOSINOPHIL NFR BLD AUTO: 3 % (ref 0–6)
ERYTHROCYTE [DISTWIDTH] IN BLOOD BY AUTOMATED COUNT: 11.8 % (ref 11.6–15.1)
GFR SERPL CREATININE-BSD FRML MDRD: 80 ML/MIN/1.73SQ M
GLUCOSE P FAST SERPL-MCNC: 94 MG/DL (ref 65–99)
HCT VFR BLD AUTO: 48.4 % (ref 34.8–46.1)
HDLC SERPL-MCNC: 76 MG/DL
HGB BLD-MCNC: 15.9 G/DL (ref 11.5–15.4)
IMM GRANULOCYTES # BLD AUTO: 0.01 THOUSAND/UL (ref 0–0.2)
IMM GRANULOCYTES NFR BLD AUTO: 0 % (ref 0–2)
LDLC SERPL CALC-MCNC: 80 MG/DL (ref 0–100)
LYMPHOCYTES # BLD AUTO: 1.8 THOUSANDS/ÂΜL (ref 0.6–4.47)
LYMPHOCYTES NFR BLD AUTO: 38 % (ref 14–44)
MCH RBC QN AUTO: 33.3 PG (ref 26.8–34.3)
MCHC RBC AUTO-ENTMCNC: 32.9 G/DL (ref 31.4–37.4)
MCV RBC AUTO: 101 FL (ref 82–98)
MONOCYTES # BLD AUTO: 0.45 THOUSAND/ÂΜL (ref 0.17–1.22)
MONOCYTES NFR BLD AUTO: 9 % (ref 4–12)
NEUTROPHILS # BLD AUTO: 2.32 THOUSANDS/ÂΜL (ref 1.85–7.62)
NEUTS SEG NFR BLD AUTO: 49 % (ref 43–75)
NONHDLC SERPL-MCNC: 107 MG/DL
NRBC BLD AUTO-RTO: 0 /100 WBCS
PLATELET # BLD AUTO: 217 THOUSANDS/UL (ref 149–390)
PMV BLD AUTO: 11.3 FL (ref 8.9–12.7)
POTASSIUM SERPL-SCNC: 3.9 MMOL/L (ref 3.5–5.3)
PROT SERPL-MCNC: 7.5 G/DL (ref 6.4–8.4)
RBC # BLD AUTO: 4.78 MILLION/UL (ref 3.81–5.12)
SODIUM SERPL-SCNC: 136 MMOL/L (ref 135–147)
TRIGL SERPL-MCNC: 134 MG/DL
WBC # BLD AUTO: 4.77 THOUSAND/UL (ref 4.31–10.16)

## 2023-08-09 DIAGNOSIS — I10 ESSENTIAL HYPERTENSION: ICD-10-CM

## 2023-08-09 RX ORDER — AMLODIPINE BESYLATE 10 MG/1
TABLET ORAL
Qty: 90 TABLET | Refills: 1 | Status: SHIPPED | OUTPATIENT
Start: 2023-08-09

## 2023-08-09 RX ORDER — LOSARTAN POTASSIUM AND HYDROCHLOROTHIAZIDE 12.5; 5 MG/1; MG/1
TABLET ORAL
Qty: 90 TABLET | Refills: 1 | Status: SHIPPED | OUTPATIENT
Start: 2023-08-09

## 2023-08-10 ENCOUNTER — VBI (OUTPATIENT)
Dept: ADMINISTRATIVE | Facility: OTHER | Age: 73
End: 2023-08-10

## 2023-11-02 ENCOUNTER — VBI (OUTPATIENT)
Dept: ADMINISTRATIVE | Facility: OTHER | Age: 73
End: 2023-11-02

## 2023-11-20 ENCOUNTER — RA CDI HCC (OUTPATIENT)
Dept: OTHER | Facility: HOSPITAL | Age: 73
End: 2023-11-20

## 2023-11-20 NOTE — PROGRESS NOTES
[address] [facility] coding opportunities       Chart reviewed, no opportunity found: [address]        Patients Insurance     Medicare Insurance: [facility]

## 2023-11-29 ENCOUNTER — OFFICE VISIT (OUTPATIENT)
Dept: FAMILY MEDICINE CLINIC | Facility: CLINIC | Age: 73
End: 2023-11-29
Payer: COMMERCIAL

## 2023-11-29 VITALS
OXYGEN SATURATION: 95 % | HEIGHT: 63 IN | BODY MASS INDEX: 27.32 KG/M2 | DIASTOLIC BLOOD PRESSURE: 78 MMHG | TEMPERATURE: 98 F | HEART RATE: 73 BPM | WEIGHT: 154.2 LBS | SYSTOLIC BLOOD PRESSURE: 138 MMHG

## 2023-11-29 DIAGNOSIS — Z00.00 MEDICARE ANNUAL WELLNESS VISIT, SUBSEQUENT: ICD-10-CM

## 2023-11-29 DIAGNOSIS — I10 ESSENTIAL HYPERTENSION: Primary | ICD-10-CM

## 2023-11-29 PROCEDURE — 99213 OFFICE O/P EST LOW 20 MIN: CPT | Performed by: FAMILY MEDICINE

## 2023-11-29 PROCEDURE — G0439 PPPS, SUBSEQ VISIT: HCPCS | Performed by: FAMILY MEDICINE

## 2023-11-29 NOTE — PROGRESS NOTES
Assessment and Plan:     Problem List Items Addressed This Visit          Cardiovascular and Mediastinum    Essential hypertension - Primary    Relevant Orders    CBC and differential    Comprehensive metabolic panel    Lipid panel     Other Visit Diagnoses       Medicare annual wellness visit, subsequent              BMI Counseling: Body mass index is 27.32 kg/m². The BMI is above normal. Nutrition recommendations include decreasing portion sizes, decreasing fast food intake, consuming healthier snacks, limiting drinks that contain sugar and moderation in carbohydrate intake. Exercise recommendations include moderate physical activity 150 minutes/week, exercising 3-5 times per week and strength training exercises. No pharmacotherapy was ordered. Patient referred to PCP. Rationale for BMI follow-up plan is due to patient being overweight or obese. Depression Screening and Follow-up Plan: Patient was screened for depression during today's encounter. They screened negative with a PHQ-2 score of 0. Preventive health issues were discussed with patient, and age appropriate screening tests were ordered as noted in patient's After Visit Summary. Personalized health advice and appropriate referrals for health education or preventive services given if needed, as noted in patient's After Visit Summary. History of Present Illness:     Patient presents for a Medicare Wellness Visit    Here for f/u. BP controlled. Current smoker, down to 4 cigs a day. Only smokes outside, weather limits her habit. Some SOB at baseline, for years, able to do what she needs to do. Patient Care Team:  Aaron Hensley MD as PCP - General     Review of Systems:     Review of Systems   Constitutional:  Negative for fatigue and fever. HENT:  Negative for congestion. Eyes:  Negative for visual disturbance. Respiratory:  Negative for chest tightness and shortness of breath.     Cardiovascular:  Negative for chest pain and palpitations. Gastrointestinal:  Negative for abdominal pain. Genitourinary:  Negative for difficulty urinating. Musculoskeletal:  Negative for arthralgias. Neurological:  Negative for headaches. Hematological:  Does not bruise/bleed easily.         Problem List:     Patient Active Problem List   Diagnosis    Essential hypertension    Arthralgia of right knee    Osteoarthritis of knee    Osteoporosis, postmenopausal    Pes anserine bursitis    Vitamin D deficiency      Past Medical and Surgical History:     Past Medical History:   Diagnosis Date    Hypertension     Shin splints     last assessed 03/27/17    Symptomatic menopausal or female climacteric states     last assessed 08/26/13     Past Surgical History:   Procedure Laterality Date    APPENDECTOMY      [place] OPTX FEM SHFT FX W/INSJ IMED IMPLT W/WO SCREW Left 3/23/2018    Procedure: LEFT HIP LONG TROCHANTERIC FEMORAL NAIL;  Surgeon: Flower Jurado MD;  Location: [facility];  Service: Orthopedics    TOTAL ABDOMINAL HYSTERECTOMY        Family History:     Family History   Problem Relation Age of Onset    No Known Problems Mother     Heart disease Father         CABG three or more nonautogenous grafts    Heart attack Brother       Social History:     Social History     Socioeconomic History    Marital status: /Civil Union     Spouse name: None    Number of children: None    Years of education: None    Highest education level: None   Occupational History    None   Tobacco Use    Smoking status: Every Day     Types: Cigarettes    Smokeless tobacco: Never    Tobacco comments:     About 4 cigarettes per day   Substance and Sexual Activity    Alcohol use: No    Drug use: No    Sexual activity: None   Other Topics Concern    None   Social History Narrative    None     Social Determinants of Health     Financial Resource Strain: Low Risk  (11/29/2023)    Overall Financial Resource Strain (CARDIA)     Difficulty of Paying Living Expenses: Not very hard   Food Insecurity: Not on file   Transportation Needs: No Transportation Needs (11/29/2023)    PRAPARE - Transportation     Lack of Transportation (Medical): No     Lack of Transportation (Non-Medical): No   Physical Activity: Not on file   Stress: Not on file   Social Connections: Not on file   Intimate Partner Violence: Not on file   Housing Stability: Not on file      Medications and Allergies:     Current Outpatient Medications   Medication Sig Dispense Refill    amLODIPine (NORVASC) 10 mg tablet TAKE 1 TABLET EVERY DAY FOR BLOOD PRESSURE , NEED FOLLOW UP IN 6 MONTHS 90 tablet 1    Cholecalciferol (VITAMIN D-3) 5000 units TABS Take 1 capsule by mouth daily      losartan-hydrochlorothiazide (HYZAAR) 50-12.5 mg per tablet TAKE 1 TABLET EVERY DAY (NEED FOLLOW UP IN 6 MONTHS) 90 tablet 1     No current facility-administered medications for this visit. Allergies   Allergen Reactions    Fluzone Quadrivalent  [Influenza Vac Split Quad]       Immunizations:     Immunization History   Administered Date(s) Administered    COVID-19 MODERNA VACC 0.5 ML IM 03/25/2021, 04/22/2021      Health Maintenance:         Topic Date Due    Hepatitis C Screening  Never done    DXA SCAN  Never done    Breast Cancer Screening: Mammogram  Never done    Colorectal Cancer Screening  Never done         Topic Date Due    Pneumococcal Vaccine: 65+ Years (1 - PCV) Never done    COVID-19 Vaccine (3 - Moderna series) 06/17/2021    Influenza Vaccine (1) Never done      Medicare Screening Tests and Risk Assessments:     Brianda Thompson is here for her Subsequent Wellness visit. Last Medicare Wellness visit information reviewed, patient interviewed and updates made to the record today. Health Risk Assessment:   Patient rates overall health as very good. Patient feels that their physical health rating is same. Patient is very satisfied with their life. Eyesight was rated as same. Hearing was rated as same.  Patient feels that their emotional and mental health rating is same. Patients states they are never, rarely angry. Patient states they are never, rarely unusually tired/fatigued. Pain experienced in the last 7 days has been none. Patient states that she has experienced no weight loss or gain in last 6 months. Depression Screening:   PHQ-2 Score: 0      Fall Risk Screening: In the past year, patient has experienced: no history of falling in past year      Urinary Incontinence Screening:   Patient has not leaked urine accidently in the last six months. Home Safety:  Patient does not have trouble with stairs inside or outside of their home. Patient has working smoke alarms and has no working carbon monoxide detector. Home safety hazards include: none. Nutrition:   Current diet is Regular. Medications:   Patient is currently taking over-the-counter supplements. OTC medications include: see medication list. Patient is able to manage medications. Activities of Daily Living (ADLs)/Instrumental Activities of Daily Living (IADLs):   Walk and transfer into and out of bed and chair?: Yes  Dress and groom yourself?: Yes    Bathe or shower yourself?: Yes    Feed yourself?  Yes  Do your laundry/housekeeping?: Yes  Manage your money, pay your bills and track your expenses?: Yes  Make your own meals?: Yes    Do your own shopping?: Yes    Previous Hospitalizations:   Any hospitalizations or ED visits within the last 12 months?: No      Advance Care Planning:   Living will: No      Cognitive Screening:   Provider or family/friend/caregiver concerned regarding cognition?: No    PREVENTIVE SCREENINGS      Cardiovascular Screening:    General: Screening Current      Diabetes Screening:     General: Screening Current      Colorectal Cancer Screening:     General: Patient Declines      Breast Cancer Screening:     General: Screening Not Indicated      Cervical Cancer Screening:    General: Screening Not Indicated      Osteoporosis Screening:    General: Screening Not Indicated and History Osteoporosis      Abdominal Aortic Aneurysm (AAA) Screening:        General: Screening Not Indicated      Lung Cancer Screening:     General: Patient Declines      Hepatitis C Screening:    General: Screening Not Indicated    Screening, Brief Intervention, and Referral to Treatment (SBIRT)    Screening  Typical number of drinks in a day: 0  Typical number of drinks in a week: 0  Interpretation: Low risk drinking behavior. Single Item Drug Screening:  How often have you used an illegal drug (including marijuana) or a prescription medication for non-medical reasons in the past year? never    Single Item Drug Screen Score: 0  Interpretation: Negative screen for possible drug use disorder    Brief Intervention  Alcohol cessation counseling given. No results found. Physical Exam:     /78 (BP Location: Right arm, Patient Position: Sitting, Cuff Size: Standard)   Pulse 73   Temp 98 °F (36.7 °C) (Temporal)   Ht 5' 3" (1.6 m)   Wt 69.9 kg (154 lb 3.2 oz)   SpO2 95%   BMI 27.32 kg/m²     Physical Exam  Vitals reviewed. Constitutional:       Appearance: Normal appearance. Cardiovascular:      Rate and Rhythm: Normal rate and regular rhythm. Pulmonary:      Effort: Pulmonary effort is normal.      Breath sounds: Normal breath sounds. Neurological:      General: No focal deficit present. Mental Status: She is alert and oriented to person, place, and time. Psychiatric:         Mood and Affect: Mood normal.         Behavior: Behavior normal.         Thought Content:  Thought content normal.         Judgment: Judgment normal.          Kelby Muniz MD

## 2023-11-29 NOTE — PATIENT INSTRUCTIONS
Medicare Preventive Visit Patient Instructions  Thank you for completing your Welcome to Medicare Visit or Medicare Annual Wellness Visit today. Your next wellness visit will be due in one year (11/29/2024). The screening/preventive services that you may require over the next 5-10 years are detailed below. Some tests may not apply to you based off risk factors and/or age. Screening tests ordered at today's visit but not completed yet may show as past due. Also, please note that scanned in results may not display below. Preventive Screenings:  Service Recommendations Previous Testing/Comments   Colorectal Cancer Screening  * Colonoscopy    * Fecal Occult Blood Test (FOBT)/Fecal Immunochemical Test (FIT)  * Fecal DNA/Cologuard Test  * Flexible Sigmoidoscopy Age: 43-73 years old   Colonoscopy: every 10 years (may be performed more frequently if at higher risk)  OR  FOBT/FIT: every 1 year  OR  Cologuard: every 3 years  OR  Sigmoidoscopy: every 5 years  Screening may be recommended earlier than age 39 if at higher risk for colorectal cancer. Also, an individualized decision between you and your healthcare provider will decide whether screening between the ages of 77-80 would be appropriate. Colonoscopy: Not on file  FOBT/FIT: Not on file  Cologuard: Not on file  Sigmoidoscopy: Not on file          Breast Cancer Screening Age: 36 years old  Frequency: every 1-2 years  Not required if history of left and right mastectomy Mammogram: Not on file        Cervical Cancer Screening Between the ages of 21-29, pap smear recommended once every 3 years. Between the ages of 32-69, can perform pap smear with HPV co-testing every 5 years.    Recommendations may differ for women with a history of total hysterectomy, cervical cancer, or abnormal pap smears in past. Pap Smear: Not on file    Screening Not Indicated   Hepatitis C Screening Once for adults born between [address] [place] [address]  More frequently in patients at high risk for Hepatitis C Hep C Antibody: Not on file        Diabetes Screening 1-2 times per year if you're at risk for diabetes or have pre-diabetes Fasting glucose: 94 mg/dL (3/16/2023)  A1C: No results in last 5 years (No results in last 5 years)  Screening Current   Cholesterol Screening Once every 5 years if you don't have a lipid disorder. May order more often based on risk factors. Lipid panel: 03/16/2023    Screening Current     Other Preventive Screenings Covered by Medicare:  Abdominal Aortic Aneurysm (AAA) Screening: covered once if your at risk. You're considered to be at risk if you have a family history of AAA. Lung Cancer Screening: covers low dose CT scan once per year if you meet all of the following conditions: (1) Age 48-67; (2) No signs or symptoms of lung cancer; (3) Current smoker or have quit smoking within the last 15 years; (4) You have a tobacco smoking history of at least 20 pack years (packs per day multiplied by number of years you smoked); (5) You get a written order from a healthcare provider. Glaucoma Screening: covered annually if you're considered high risk: (1) You have diabetes OR (2) Family history of glaucoma OR (3)  aged 48 and older OR (3)  American aged 72 and older  Osteoporosis Screening: covered every 2 years if you meet one of the following conditions: (1) You're estrogen deficient and at risk for osteoporosis based off medical history and other findings; (2) Have a vertebral abnormality; (3) On glucocorticoid therapy for more than 3 months; (4) Have primary hyperparathyroidism; (5) On osteoporosis medications and need to assess response to drug therapy. Last bone density test (DXA Scan): Not on file. HIV Screening: covered annually if you're between the age of 14-79. Also covered annually if you are younger than 13 and older than 72 with risk factors for HIV infection.  For pregnant patients, it is covered up to 3 times per pregnancy. Immunizations:  Immunization Recommendations   Influenza Vaccine Annual influenza vaccination during flu season is recommended for all persons aged >= 6 months who do not have contraindications   Pneumococcal Vaccine   * Pneumococcal conjugate vaccine = PCV13 (Prevnar 13), PCV15 (Vaxneuvance), PCV20 (Prevnar 20)  * Pneumococcal polysaccharide vaccine = PPSV23 (Pneumovax) Adults 13-85 yo with certain risk factors or if 69+ yo  If never received any pneumonia vaccine: recommend Prevnar 20 (PCV20)  Give PCV20 if previously received 1 dose of PCV13 or PPSV23   Hepatitis B Vaccine 3 dose series if at intermediate or high risk (ex: diabetes, end stage renal disease, liver disease)   Respiratory syncytial virus (RSV) Vaccine - COVERED BY MEDICARE PART D  * RSVPreF3 (Arexvy) CDC recommends that adults 61years of age and older may receive a single dose of RSV vaccine using shared clinical decision-making (SCDM)   Tetanus (Td) Vaccine - COST NOT COVERED BY MEDICARE PART B Following completion of primary series, a booster dose should be given every 10 years to maintain immunity against tetanus. Td may also be given as tetanus wound prophylaxis. Tdap Vaccine - COST NOT COVERED BY MEDICARE PART B Recommended at least once for all adults. For pregnant patients, recommended with each pregnancy.    Shingles Vaccine (Shingrix) - COST NOT COVERED BY MEDICARE PART B  2 shot series recommended in those 19 years and older who have or will have weakened immune systems or those 50 years and older     Health Maintenance Due:      Topic Date Due   • Hepatitis C Screening  Never done   • DXA SCAN  Never done   • Breast Cancer Screening: Mammogram  Never done   • Colorectal Cancer Screening  Never done     Immunizations Due:      Topic Date Due   • Pneumococcal Vaccine: 65+ Years (1 - PCV) Never done   • COVID-19 Vaccine (3 - Moderna series) 06/17/2021   • Influenza Vaccine (1) Never done     Advance Directives   What are advance directives? Advance directives are legal documents that state your wishes and plans for medical care. These plans are made ahead of time in case you lose your ability to make decisions for yourself. Advance directives can apply to any medical decision, such as the treatments you want, and if you want to donate organs. What are the types of advance directives? There are many types of advance directives, and each state has rules about how to use them. You may choose a combination of any of the following:  Living will: This is a written record of the treatment you want. You can also choose which treatments you do not want, which to limit, and which to stop at a certain time. This includes surgery, medicine, IV fluid, and tube feedings. Durable power of  for [facility]): This is a written record that states who you want to make healthcare choices for you when you are unable to make them for yourself. This person, called a proxy, is usually a family member or a friend. You may choose more than 1 proxy. Do not resuscitate (DNR) order:  A DNR order is used in case your heart stops beating or you stop breathing. It is a request not to have certain forms of treatment, such as CPR. A DNR order may be included in other types of advance directives. Medical directive: This covers the care that you want if you are in a coma, near death, or unable to make decisions for yourself. You can list the treatments you want for each condition. Treatment may include pain medicine, surgery, blood transfusions, dialysis, IV or tube feedings, and a ventilator (breathing machine). Values history: This document has questions about your views, beliefs, and how you feel and think about life. This information can help others choose the care that you would choose. Why are advance directives important? An advance directive helps you control your care.  Although spoken wishes may be used, it is better to have your wishes written down. Spoken wishes can be misunderstood, or not followed. Treatments may be given even if you do not want them. An advance directive may make it easier for your family to make difficult choices about your care. Cigarette Smoking and Your Health   Risks to your health if you smoke:  Nicotine and other chemicals found in tobacco damage every cell in your body. Even if you are a light smoker, you have an increased risk for cancer, heart disease, and lung disease. If you are pregnant or have diabetes, smoking increases your risk for complications. Benefits to your health if you stop smoking: You decrease respiratory symptoms such as coughing, wheezing, and shortness of breath. You reduce your risk for cancers of the lung, mouth, throat, kidney, bladder, pancreas, stomach, and cervix. If you already have cancer, you increase the benefits of chemotherapy. You also reduce your risk for cancer returning or a second cancer from developing. You reduce your risk for heart disease, blood clots, heart attack, and stroke. You reduce your risk for lung infections, and diseases such as pneumonia, asthma, chronic bronchitis, and emphysema. Your circulation improves. More oxygen can be delivered to your body. If you have diabetes, you lower your risk for complications, such as kidney, artery, and eye diseases. You also lower your risk for nerve damage. Nerve damage can lead to amputations, poor vision, and blindness. You improve your body's ability to heal and to fight infections. For more information and support to stop smoking:   [employer]. gov  Phone: 9- 092 - 890-0437  Web Address: www.[employer]. [employer]  Weight Management   Why it is important to manage your weight:  Being overweight increases your risk of health conditions such as heart disease, high blood pressure, type 2 diabetes, and certain types of cancer.  It can also increase your risk for osteoarthritis, sleep apnea, and other respiratory problems. Aim for a slow, steady weight loss. Even a small amount of weight loss can lower your risk of health problems. How to lose weight safely:  A safe and healthy way to lose weight is to eat fewer calories and get regular exercise. You can lose up about 1 pound a week by decreasing the number of calories you eat by 500 calories each day. Healthy meal plan for weight management:  A healthy meal plan includes a variety of foods, contains fewer calories, and helps you stay healthy. A healthy meal plan includes the following:  Eat whole-grain foods more often. A healthy meal plan should contain fiber. Fiber is the part of grains, fruits, and vegetables that is not broken down by your body. Whole-grain foods are healthy and provide extra fiber in your diet. Some examples of whole-grain foods are whole-wheat breads and pastas, oatmeal, brown rice, and bulgur. Eat a variety of vegetables every day. Include dark, leafy greens such as spinach, kale, angeli greens, and mustard greens. Eat yellow and orange vegetables such as carrots, sweet potatoes, and winter squash. Eat a variety of fruits every day. Choose fresh or canned fruit (canned in its own juice or light syrup) instead of juice. Fruit juice has very little or no fiber. Eat low-fat dairy foods. Drink fat-free (skim) milk or 1% milk. Eat fat-free yogurt and low-fat cottage cheese. Try low-fat cheeses such as mozzarella and other reduced-fat cheeses. Choose meat and other protein foods that are low in fat. Choose beans or other legumes such as split peas or lentils. Choose fish, skinless poultry (chicken or turkey), or lean cuts of red meat (beef or pork). Before you cook meat or poultry, cut off any visible fat. Use less fat and oil. Try baking foods instead of frying them. Add less fat, such as margarine, sour cream, regular salad dressing and mayonnaise to foods. Eat fewer high-fat foods.  Some examples of high-fat foods include french fries, doughnuts, ice cream, and cakes. Eat fewer sweets. Limit foods and drinks that are high in sugar. This includes candy, cookies, regular soda, and sweetened drinks. Exercise:  Exercise at least 30 minutes per day on most days of the week. Some examples of exercise include walking, biking, dancing, and swimming. You can also fit in more physical activity by taking the stairs instead of the elevator or parking farther away from stores. Ask your healthcare provider about the best exercise plan for you. © Copyright [employer] 2018 Information is for End User's use only and may not be sold, redistributed or otherwise used for commercial purposes.  All illustrations and images included in CareNotes® are the copyrighted property of [employer] Inc. or  [address]

## 2024-02-01 ENCOUNTER — VBI (OUTPATIENT)
Dept: ADMINISTRATIVE | Facility: OTHER | Age: 74
End: 2024-02-01

## 2024-04-09 ENCOUNTER — APPOINTMENT (OUTPATIENT)
Dept: LAB | Facility: MEDICAL CENTER | Age: 74
End: 2024-04-09
Payer: COMMERCIAL

## 2024-04-09 DIAGNOSIS — I10 ESSENTIAL HYPERTENSION: ICD-10-CM

## 2024-04-09 LAB
ALBUMIN SERPL BCP-MCNC: 4.5 G/DL (ref 3.5–5)
ALP SERPL-CCNC: 76 U/L (ref 34–104)
ALT SERPL W P-5'-P-CCNC: 13 U/L (ref 7–52)
ANION GAP SERPL CALCULATED.3IONS-SCNC: 9 MMOL/L (ref 4–13)
AST SERPL W P-5'-P-CCNC: 14 U/L (ref 13–39)
BASOPHILS # BLD AUTO: 0.04 THOUSANDS/ÂΜL (ref 0–0.1)
BASOPHILS NFR BLD AUTO: 1 % (ref 0–1)
BILIRUB SERPL-MCNC: 0.69 MG/DL (ref 0.2–1)
BUN SERPL-MCNC: 17 MG/DL (ref 5–25)
CALCIUM SERPL-MCNC: 9.5 MG/DL (ref 8.4–10.2)
CHLORIDE SERPL-SCNC: 98 MMOL/L (ref 96–108)
CHOLEST SERPL-MCNC: 174 MG/DL
CO2 SERPL-SCNC: 33 MMOL/L (ref 21–32)
CREAT SERPL-MCNC: 0.7 MG/DL (ref 0.6–1.3)
EOSINOPHIL # BLD AUTO: 0.07 THOUSAND/ÂΜL (ref 0–0.61)
EOSINOPHIL NFR BLD AUTO: 1 % (ref 0–6)
ERYTHROCYTE [DISTWIDTH] IN BLOOD BY AUTOMATED COUNT: 12 % (ref 11.6–15.1)
GFR SERPL CREATININE-BSD FRML MDRD: 85 ML/MIN/1.73SQ M
GLUCOSE P FAST SERPL-MCNC: 111 MG/DL (ref 65–99)
HCT VFR BLD AUTO: 49.9 % (ref 34.8–46.1)
HDLC SERPL-MCNC: 76 MG/DL
HGB BLD-MCNC: 15.9 G/DL (ref 11.5–15.4)
IMM GRANULOCYTES # BLD AUTO: 0.02 THOUSAND/UL (ref 0–0.2)
IMM GRANULOCYTES NFR BLD AUTO: 0 % (ref 0–2)
LDLC SERPL CALC-MCNC: 78 MG/DL (ref 0–100)
LYMPHOCYTES # BLD AUTO: 2.3 THOUSANDS/ÂΜL (ref 0.6–4.47)
LYMPHOCYTES NFR BLD AUTO: 34 % (ref 14–44)
MCH RBC QN AUTO: 32.8 PG (ref 26.8–34.3)
MCHC RBC AUTO-ENTMCNC: 31.9 G/DL (ref 31.4–37.4)
MCV RBC AUTO: 103 FL (ref 82–98)
MONOCYTES # BLD AUTO: 0.65 THOUSAND/ÂΜL (ref 0.17–1.22)
MONOCYTES NFR BLD AUTO: 10 % (ref 4–12)
NEUTROPHILS # BLD AUTO: 3.77 THOUSANDS/ÂΜL (ref 1.85–7.62)
NEUTS SEG NFR BLD AUTO: 54 % (ref 43–75)
NONHDLC SERPL-MCNC: 98 MG/DL
NRBC BLD AUTO-RTO: 0 /100 WBCS
PLATELET # BLD AUTO: 237 THOUSANDS/UL (ref 149–390)
PMV BLD AUTO: 10.9 FL (ref 8.9–12.7)
POTASSIUM SERPL-SCNC: 3.6 MMOL/L (ref 3.5–5.3)
PROT SERPL-MCNC: 7 G/DL (ref 6.4–8.4)
RBC # BLD AUTO: 4.85 MILLION/UL (ref 3.81–5.12)
SODIUM SERPL-SCNC: 140 MMOL/L (ref 135–147)
TRIGL SERPL-MCNC: 98 MG/DL
WBC # BLD AUTO: 6.85 THOUSAND/UL (ref 4.31–10.16)

## 2024-04-09 PROCEDURE — 36415 COLL VENOUS BLD VENIPUNCTURE: CPT

## 2024-04-09 PROCEDURE — 85025 COMPLETE CBC W/AUTO DIFF WBC: CPT

## 2024-04-09 PROCEDURE — 80061 LIPID PANEL: CPT

## 2024-04-09 PROCEDURE — 80053 COMPREHEN METABOLIC PANEL: CPT

## 2024-04-29 DIAGNOSIS — I10 ESSENTIAL HYPERTENSION: ICD-10-CM

## 2024-04-29 RX ORDER — AMLODIPINE BESYLATE 10 MG/1
TABLET ORAL
Qty: 90 TABLET | Refills: 1 | Status: SHIPPED | OUTPATIENT
Start: 2024-04-29

## 2024-05-30 ENCOUNTER — APPOINTMENT (OUTPATIENT)
Dept: RADIOLOGY | Facility: MEDICAL CENTER | Age: 74
End: 2024-05-30
Payer: COMMERCIAL

## 2024-05-30 ENCOUNTER — OFFICE VISIT (OUTPATIENT)
Dept: FAMILY MEDICINE CLINIC | Facility: CLINIC | Age: 74
End: 2024-05-30
Payer: COMMERCIAL

## 2024-05-30 ENCOUNTER — TELEPHONE (OUTPATIENT)
Dept: FAMILY MEDICINE CLINIC | Facility: CLINIC | Age: 74
End: 2024-05-30

## 2024-05-30 VITALS
TEMPERATURE: 98.3 F | OXYGEN SATURATION: 94 % | HEIGHT: 63 IN | SYSTOLIC BLOOD PRESSURE: 144 MMHG | HEART RATE: 86 BPM | BODY MASS INDEX: 26.79 KG/M2 | DIASTOLIC BLOOD PRESSURE: 72 MMHG | WEIGHT: 151.2 LBS

## 2024-05-30 DIAGNOSIS — Z98.890 S/P ORIF (OPEN REDUCTION INTERNAL FIXATION) FRACTURE: ICD-10-CM

## 2024-05-30 DIAGNOSIS — R06.02 SHORTNESS OF BREATH: ICD-10-CM

## 2024-05-30 DIAGNOSIS — Z87.81 S/P ORIF (OPEN REDUCTION INTERNAL FIXATION) FRACTURE: ICD-10-CM

## 2024-05-30 DIAGNOSIS — M79.605 PAIN OF LEFT LOWER EXTREMITY: ICD-10-CM

## 2024-05-30 DIAGNOSIS — I10 ESSENTIAL HYPERTENSION: Primary | ICD-10-CM

## 2024-05-30 PROCEDURE — G2211 COMPLEX E/M VISIT ADD ON: HCPCS | Performed by: FAMILY MEDICINE

## 2024-05-30 PROCEDURE — 73552 X-RAY EXAM OF FEMUR 2/>: CPT

## 2024-05-30 PROCEDURE — 71046 X-RAY EXAM CHEST 2 VIEWS: CPT

## 2024-05-30 PROCEDURE — 99214 OFFICE O/P EST MOD 30 MIN: CPT | Performed by: FAMILY MEDICINE

## 2024-05-30 NOTE — PROGRESS NOTES
Assessment/Plan:    1. Essential hypertension  2. S/P ORIF (open reduction internal fixation) fracture  -     XR femur 2 vw left; Future; Expected date: 05/30/2024  3. Pain of left lower extremity  -     XR femur 2 vw left; Future; Expected date: 05/30/2024  4. Shortness of breath  -     XR chest pa & lateral; Future; Expected date: 05/30/2024      There are no Patient Instructions on file for this visit.    No follow-ups on file.    Subjective:      Patient ID: Sydnie Neri is a 73 y.o. female.    Chief Complaint   Patient presents with    Follow-up     6 mth check up / SOB     Leg Pain     Left leg pain x 1 mth       Here for f/u. Has been feeling SOB and coughing for the last few months. Not worsening. Down to 4 cigs per day, smoking almost 60 years. BP adeq control, at baseline. Pain in L leg from hip to ankle, trouble walking and standing. She has hardware from ORIF surgery of L femur in 2018.  Yearly labs are stable, lipids at goal.     Leg Pain         The following portions of the patient's history were reviewed and updated as appropriate: allergies, current medications, past family history, past medical history, past social history, past surgical history and problem list.    Review of Systems   Constitutional:  Negative for fatigue and fever.   HENT:  Negative for congestion.    Eyes:  Negative for visual disturbance.   Respiratory:  Positive for cough, shortness of breath and wheezing. Negative for chest tightness.    Cardiovascular:  Positive for chest pain (rarely). Negative for palpitations and leg swelling.   Gastrointestinal:  Negative for abdominal pain.   Genitourinary:  Negative for difficulty urinating.   Musculoskeletal:  Positive for myalgias (pain in L leg, phillip from femur surgery). Negative for arthralgias.   Neurological:  Negative for headaches.   Hematological:  Does not bruise/bleed easily.         Current Outpatient Medications   Medication Sig Dispense Refill    amLODIPine  "(NORVASC) 10 mg tablet TAKE 1 TABLET EVERY DAY FOR BLOOD PRESSURE , NEED FOLLOW UP IN 6 MONTHS 90 tablet 1    Cholecalciferol (VITAMIN D-3) 5000 units TABS Take 1 capsule by mouth daily      losartan-hydrochlorothiazide (HYZAAR) 50-12.5 mg per tablet TAKE 1 TABLET EVERY DAY (NEED FOLLOW UP IN 6 MONTHS) 90 tablet 1     No current facility-administered medications for this visit.       Objective:    /72 (BP Location: Right arm, Patient Position: Sitting, Cuff Size: Standard)   Pulse 86   Temp 98.3 °F (36.8 °C)   Ht 5' 3\" (1.6 m)   Wt 68.6 kg (151 lb 3.2 oz)   SpO2 94% Comment: After 5 mins seated down  BMI 26.78 kg/m²        Physical Exam  Vitals reviewed.   Constitutional:       Appearance: Normal appearance. She is well-developed.   Cardiovascular:      Rate and Rhythm: Normal rate and regular rhythm.      Heart sounds: Normal heart sounds.   Pulmonary:      Effort: Pulmonary effort is normal.      Breath sounds: Wheezing (worse in L lower lobe, but diffuse exp wheeze) present.   Skin:     General: Skin is warm.   Neurological:      General: No focal deficit present.      Mental Status: She is alert and oriented to person, place, and time.   Psychiatric:         Mood and Affect: Mood normal.         Behavior: Behavior normal.         Thought Content: Thought content normal.         Judgment: Judgment normal.                Sonia Millard MD  "

## 2024-07-18 DIAGNOSIS — I10 ESSENTIAL HYPERTENSION: ICD-10-CM

## 2024-07-18 RX ORDER — LOSARTAN POTASSIUM AND HYDROCHLOROTHIAZIDE 12.5; 5 MG/1; MG/1
TABLET ORAL
Qty: 100 TABLET | Refills: 1 | Status: SHIPPED | OUTPATIENT
Start: 2024-07-18

## 2024-09-29 DIAGNOSIS — I10 ESSENTIAL HYPERTENSION: ICD-10-CM

## 2024-10-01 RX ORDER — AMLODIPINE BESYLATE 10 MG/1
TABLET ORAL
Qty: 90 TABLET | Refills: 1 | Status: SHIPPED | OUTPATIENT
Start: 2024-10-01

## 2024-11-13 ENCOUNTER — VBI (OUTPATIENT)
Dept: ADMINISTRATIVE | Facility: OTHER | Age: 74
End: 2024-11-13

## 2024-11-13 NOTE — TELEPHONE ENCOUNTER
11/13/24 7:14 AM     Chart reviewed for BP ; nothing is submitted to the patient's insurance at this time.     Jason Edwards MA   [facility]  
PROVIDER:[TOKEN:[[id number]]]

## 2024-12-02 ENCOUNTER — OFFICE VISIT (OUTPATIENT)
Dept: FAMILY MEDICINE CLINIC | Facility: CLINIC | Age: 74
End: 2024-12-02
Payer: COMMERCIAL

## 2024-12-02 VITALS
SYSTOLIC BLOOD PRESSURE: 144 MMHG | HEIGHT: 63 IN | HEART RATE: 82 BPM | TEMPERATURE: 97.5 F | OXYGEN SATURATION: 97 % | WEIGHT: 150.8 LBS | BODY MASS INDEX: 26.72 KG/M2 | DIASTOLIC BLOOD PRESSURE: 72 MMHG

## 2024-12-02 DIAGNOSIS — M81.0 OSTEOPOROSIS, POSTMENOPAUSAL: ICD-10-CM

## 2024-12-02 DIAGNOSIS — Z00.00 ENCOUNTER FOR SUBSEQUENT ANNUAL WELLNESS VISIT (AWV) IN MEDICARE PATIENT: ICD-10-CM

## 2024-12-02 DIAGNOSIS — I10 ESSENTIAL HYPERTENSION: Primary | ICD-10-CM

## 2024-12-02 DIAGNOSIS — E55.9 VITAMIN D DEFICIENCY: ICD-10-CM

## 2024-12-02 PROCEDURE — G0439 PPPS, SUBSEQ VISIT: HCPCS | Performed by: FAMILY MEDICINE

## 2024-12-02 PROCEDURE — 99213 OFFICE O/P EST LOW 20 MIN: CPT | Performed by: FAMILY MEDICINE

## 2024-12-02 NOTE — PATIENT INSTRUCTIONS
Medicare Preventive Visit Patient Instructions  Thank you for completing your Welcome to Medicare Visit or Medicare Annual Wellness Visit today. Your next wellness visit will be due in one year (12/3/2025).  The screening/preventive services that you may require over the next 5-10 years are detailed below. Some tests may not apply to you based off risk factors and/or age. Screening tests ordered at today's visit but not completed yet may show as past due. Also, please note that scanned in results may not display below.  Preventive Screenings:  Service Recommendations Previous Testing/Comments   Colorectal Cancer Screening  * Colonoscopy    * Fecal Occult Blood Test (FOBT)/Fecal Immunochemical Test (FIT)  * Fecal DNA/Cologuard Test  * Flexible Sigmoidoscopy Age: 45-75 years old   Colonoscopy: every 10 years (may be performed more frequently if at higher risk)  OR  FOBT/FIT: every 1 year  OR  Cologuard: every 3 years  OR  Sigmoidoscopy: every 5 years  Screening may be recommended earlier than age 45 if at higher risk for colorectal cancer. Also, an individualized decision between you and your healthcare provider will decide whether screening between the ages of 76-85 would be appropriate. Colonoscopy: Not on file  FOBT/FIT: Not on file  Cologuard: Not on file  Sigmoidoscopy: Not on file          Breast Cancer Screening Age: 40+ years old  Frequency: every 1-2 years  Not required if history of left and right mastectomy Mammogram: Not on file        Cervical Cancer Screening Between the ages of 21-29, pap smear recommended once every 3 years.   Between the ages of 30-65, can perform pap smear with HPV co-testing every 5 years.   Recommendations may differ for women with a history of total hysterectomy, cervical cancer, or abnormal pap smears in past. Pap Smear: Not on file    Screening Not Indicated   Hepatitis C Screening Once for adults born between 1945 and 1965  More frequently in patients at high risk for Hepatitis  C Hep C Antibody: Not on file        Diabetes Screening 1-2 times per year if you're at risk for diabetes or have pre-diabetes Fasting glucose: 111 mg/dL (4/9/2024)  A1C: No results in last 5 years (No results in last 5 years)  Screening Current   Cholesterol Screening Once every 5 years if you don't have a lipid disorder. May order more often based on risk factors. Lipid panel: 04/09/2024    Screening Current     Other Preventive Screenings Covered by Medicare:  Abdominal Aortic Aneurysm (AAA) Screening: covered once if your at risk. You're considered to be at risk if you have a family history of AAA.  Lung Cancer Screening: covers low dose CT scan once per year if you meet all of the following conditions: (1) Age 55-77; (2) No signs or symptoms of lung cancer; (3) Current smoker or have quit smoking within the last 15 years; (4) You have a tobacco smoking history of at least 20 pack years (packs per day multiplied by number of years you smoked); (5) You get a written order from a healthcare provider.  Glaucoma Screening: covered annually if you're considered high risk: (1) You have diabetes OR (2) Family history of glaucoma OR (3)  aged 50 and older OR (4)  American aged 65 and older  Osteoporosis Screening: covered every 2 years if you meet one of the following conditions: (1) You're estrogen deficient and at risk for osteoporosis based off medical history and other findings; (2) Have a vertebral abnormality; (3) On glucocorticoid therapy for more than 3 months; (4) Have primary hyperparathyroidism; (5) On osteoporosis medications and need to assess response to drug therapy.   Last bone density test (DXA Scan): Not on file.  HIV Screening: covered annually if you're between the age of 15-65. Also covered annually if you are younger than 15 and older than 65 with risk factors for HIV infection. For pregnant patients, it is covered up to 3 times per pregnancy.    Immunizations:  Immunization  Recommendations   Influenza Vaccine Annual influenza vaccination during flu season is recommended for all persons aged >= 6 months who do not have contraindications   Pneumococcal Vaccine   * Pneumococcal conjugate vaccine = PCV13 (Prevnar 13), PCV15 (Vaxneuvance), PCV20 (Prevnar 20)  * Pneumococcal polysaccharide vaccine = PPSV23 (Pneumovax) Adults 19-65 yo with certain risk factors or if 65+ yo  If never received any pneumonia vaccine: recommend Prevnar 20 (PCV20)  Give PCV20 if previously received 1 dose of PCV13 or PPSV23   Hepatitis B Vaccine 3 dose series if at intermediate or high risk (ex: diabetes, end stage renal disease, liver disease)   Respiratory syncytial virus (RSV) Vaccine - COVERED BY MEDICARE PART D  * RSVPreF3 (Arexvy) CDC recommends that adults 60 years of age and older may receive a single dose of RSV vaccine using shared clinical decision-making (SCDM)   Tetanus (Td) Vaccine - COST NOT COVERED BY MEDICARE PART B Following completion of primary series, a booster dose should be given every 10 years to maintain immunity against tetanus. Td may also be given as tetanus wound prophylaxis.   Tdap Vaccine - COST NOT COVERED BY MEDICARE PART B Recommended at least once for all adults. For pregnant patients, recommended with each pregnancy.   Shingles Vaccine (Shingrix) - COST NOT COVERED BY MEDICARE PART B  2 shot series recommended in those 19 years and older who have or will have weakened immune systems or those 50 years and older     Health Maintenance Due:      Topic Date Due   • Hepatitis C Screening  Never done   • DXA SCAN  Never done     Immunizations Due:      Topic Date Due   • Pneumococcal Vaccine: 65+ Years (1 of 2 - PCV) Never done   • Influenza Vaccine (1) Never done   • COVID-19 Vaccine (3 - 2024-25 season) 09/01/2024     Advance Directives   What are advance directives?  Advance directives are legal documents that state your wishes and plans for medical care. These plans are made ahead  of time in case you lose your ability to make decisions for yourself. Advance directives can apply to any medical decision, such as the treatments you want, and if you want to donate organs.   What are the types of advance directives?  There are many types of advance directives, and each state has rules about how to use them. You may choose a combination of any of the following:  Living will:  This is a written record of the treatment you want. You can also choose which treatments you do not want, which to limit, and which to stop at a certain time. This includes surgery, medicine, IV fluid, and tube feedings.   Durable power of  for healthcare (DPAHC):  This is a written record that states who you want to make healthcare choices for you when you are unable to make them for yourself. This person, called a proxy, is usually a family member or a friend. You may choose more than 1 proxy.  Do not resuscitate (DNR) order:  A DNR order is used in case your heart stops beating or you stop breathing. It is a request not to have certain forms of treatment, such as CPR. A DNR order may be included in other types of advance directives.  Medical directive:  This covers the care that you want if you are in a coma, near death, or unable to make decisions for yourself. You can list the treatments you want for each condition. Treatment may include pain medicine, surgery, blood transfusions, dialysis, IV or tube feedings, and a ventilator (breathing machine).  Values history:  This document has questions about your views, beliefs, and how you feel and think about life. This information can help others choose the care that you would choose.  Why are advance directives important?  An advance directive helps you control your care. Although spoken wishes may be used, it is better to have your wishes written down. Spoken wishes can be misunderstood, or not followed. Treatments may be given even if you do not want them. An advance  directive may make it easier for your family to make difficult choices about your care.   Weight Management   Why it is important to manage your weight:  Being overweight increases your risk of health conditions such as heart disease, high blood pressure, type 2 diabetes, and certain types of cancer. It can also increase your risk for osteoarthritis, sleep apnea, and other respiratory problems. Aim for a slow, steady weight loss. Even a small amount of weight loss can lower your risk of health problems.  How to lose weight safely:  A safe and healthy way to lose weight is to eat fewer calories and get regular exercise. You can lose up about 1 pound a week by decreasing the number of calories you eat by 500 calories each day.   Healthy meal plan for weight management:  A healthy meal plan includes a variety of foods, contains fewer calories, and helps you stay healthy. A healthy meal plan includes the following:  Eat whole-grain foods more often.  A healthy meal plan should contain fiber. Fiber is the part of grains, fruits, and vegetables that is not broken down by your body. Whole-grain foods are healthy and provide extra fiber in your diet. Some examples of whole-grain foods are whole-wheat breads and pastas, oatmeal, brown rice, and bulgur.  Eat a variety of vegetables every day.  Include dark, leafy greens such as spinach, kale, angeli greens, and mustard greens. Eat yellow and orange vegetables such as carrots, sweet potatoes, and winter squash.   Eat a variety of fruits every day.  Choose fresh or canned fruit (canned in its own juice or light syrup) instead of juice. Fruit juice has very little or no fiber.  Eat low-fat dairy foods.  Drink fat-free (skim) milk or 1% milk. Eat fat-free yogurt and low-fat cottage cheese. Try low-fat cheeses such as mozzarella and other reduced-fat cheeses.  Choose meat and other protein foods that are low in fat.  Choose beans or other legumes such as split peas or lentils.  Choose fish, skinless poultry (chicken or turkey), or lean cuts of red meat (beef or pork). Before you cook meat or poultry, cut off any visible fat.   Use less fat and oil.  Try baking foods instead of frying them. Add less fat, such as margarine, sour cream, regular salad dressing and mayonnaise to foods. Eat fewer high-fat foods. Some examples of high-fat foods include french fries, doughnuts, ice cream, and cakes.  Eat fewer sweets.  Limit foods and drinks that are high in sugar. This includes candy, cookies, regular soda, and sweetened drinks.  Exercise:  Exercise at least 30 minutes per day on most days of the week. Some examples of exercise include walking, biking, dancing, and swimming. You can also fit in more physical activity by taking the stairs instead of the elevator or parking farther away from stores. Ask your healthcare provider about the best exercise plan for you.      © Copyright [employer] 2018 Information is for End User's use only and may not be sold, redistributed or otherwise used for commercial purposes. All illustrations and images included in CareNotes® are the copyrighted property of [employer] Inc[employer] or [employer]

## 2024-12-02 NOTE — PROGRESS NOTES
Name: Sydnie Neri      : 1950      MRN: [id number]  Encounter Provider: Sonia Millard MD  Encounter Date: 2024   Encounter department: [facility]    Assessment & Plan  Essential hypertension         Encounter for subsequent annual wellness visit (AWV) in Medicare patient         Osteoporosis, postmenopausal         Vitamin D deficiency           Depression Screening and Follow-up Plan: Patient was screened for depression during today's encounter. They screened negative with a PHQ-2 score of 0.      Preventive health issues were discussed with patient, and age appropriate screening tests were ordered as noted in patient's After Visit Summary. Personalized health advice and appropriate referrals for health education or preventive services given if needed, as noted in patient's After Visit Summary.    History of Present Illness     Here for awv, quit smoking , cold turkey. BP has been stable. Feels well, declines HM testing.        Patient Care Team:  Sonia Millard MD as PCP - General    Review of Systems   Constitutional:  Negative for fatigue and fever.   HENT:  Negative for congestion.    Eyes:  Negative for visual disturbance.   Respiratory:  Negative for chest tightness and shortness of breath.    Cardiovascular:  Negative for chest pain and palpitations.   Gastrointestinal:  Negative for abdominal pain.   Genitourinary:  Negative for difficulty urinating.   Musculoskeletal:  Negative for arthralgias.   Neurological:  Negative for headaches.   Hematological:  Does not bruise/bleed easily.     Medical History Reviewed by provider this encounter:       Annual Wellness Visit Questionnaire   Sydnie is here for her Subsequent Wellness visit. Last Medicare Wellness visit information reviewed, patient interviewed and updates made to the record today.      Health Risk Assessment:   Patient rates overall health as very good. Patient feels that their physical health  rating is slightly better. Patient is satisfied with their life. Eyesight was rated as same. Hearing was rated as same. Patient feels that their emotional and mental health rating is same. Patients states they are sometimes angry. Patient states they are never, rarely unusually tired/fatigued. Pain experienced in the last 7 days has been some. Patient's pain rating has been 2/10. Patient states that she has experienced no weight loss or gain in last 6 months. knees    Depression Screening:   PHQ-2 Score: 0      Fall Risk Screening:   In the past year, patient has experienced: no history of falling in past year      Urinary Incontinence Screening:   Patient has not leaked urine accidently in the last six months.     Home Safety:  Patient does not have trouble with stairs inside or outside of their home. Patient has working smoke alarms and has no working carbon monoxide detector. Home safety hazards include: none.     Nutrition:   Current diet is Regular.     Medications:   Patient is currently taking over-the-counter supplements. OTC medications include: see medication list. Patient is able to manage medications.     Activities of Daily Living (ADLs)/Instrumental Activities of Daily Living (IADLs):   Walk and transfer into and out of bed and chair?: Yes  Dress and groom yourself?: Yes    Bathe or shower yourself?: Yes    Feed yourself? Yes  Do your laundry/housekeeping?: Yes  Manage your money, pay your bills and track your expenses?: Yes  Make your own meals?: Yes    Do your own shopping?: Yes    Previous Hospitalizations:   Any hospitalizations or ED visits within the last 12 months?: No      Advance Care Planning:   Living will: No    Advanced directive counseling given: Yes    ACP document given: Yes      Cognitive Screening:   Provider or family/friend/caregiver concerned regarding cognition?: No    PREVENTIVE SCREENINGS      Cardiovascular Screening:    General: Screening Current      Diabetes Screening:      "General: Screening Current      Colorectal Cancer Screening:     General: Patient Declines      Breast Cancer Screening:     General: Patient Declines      Cervical Cancer Screening:    General: Screening Not Indicated      Osteoporosis Screening:    General: Screening Not Indicated and History Osteoporosis      Abdominal Aortic Aneurysm (AAA) Screening:        General: Screening Not Indicated      Lung Cancer Screening:     General: Screening Not Indicated      Hepatitis C Screening:    General: Screening Not Indicated    Screening, Brief Intervention, and Referral to Treatment (SBIRT)    Screening      Single Item Drug Screening:  How often have you used an illegal drug (including marijuana) or a prescription medication for non-medical reasons in the past year? never    Single Item Drug Screen Score: 0  Interpretation: Negative screen for possible drug use disorder    Social Drivers of Health     Financial Resource Strain: Low Risk  (11/29/2023)    Overall Financial Resource Strain (CARDIA)     Difficulty of Paying Living Expenses: Not very hard   Food Insecurity: No Food Insecurity (12/2/2024)    Hunger Vital Sign     Worried About Running Out of Food in the Last Year: Never true     Ran Out of Food in the Last Year: Never true   Transportation Needs: No Transportation Needs (12/2/2024)    PRAPARE - Transportation     Lack of Transportation (Medical): No     Lack of Transportation (Non-Medical): No   Housing Stability: Low Risk  (12/2/2024)    Housing Stability Vital Sign     Unable to Pay for Housing in the Last Year: No     Number of Times Moved in the Last Year: 0     Homeless in the Last Year: No   Utilities: Not At Risk (12/2/2024)    [facility] Utilities     Threatened with loss of utilities: No     No results found.    Objective   /72 (BP Location: Right arm, Patient Position: Sitting, Cuff Size: Standard)   Pulse 82   Temp 97.5 °F (36.4 °C) (Temporal)   Ht 5' 3\" (1.6 m)   Wt 68.4 kg (150 lb 12.8 oz)   " SpO2 97%   BMI 26.71 kg/m²     Physical Exam  Vitals reviewed.   Constitutional:       Appearance: Normal appearance. She is well-developed.   Cardiovascular:      Rate and Rhythm: Normal rate and regular rhythm.      Heart sounds: Normal heart sounds.   Pulmonary:      Effort: Pulmonary effort is normal.      Breath sounds: Normal breath sounds.   Skin:     General: Skin is warm.   Neurological:      General: No focal deficit present.      Mental Status: She is alert and oriented to person, place, and time.   Psychiatric:         Mood and Affect: Mood normal.         Behavior: Behavior normal.         Thought Content: Thought content normal.         Judgment: Judgment normal.

## 2024-12-14 DIAGNOSIS — I10 ESSENTIAL HYPERTENSION: ICD-10-CM

## 2024-12-15 RX ORDER — LOSARTAN POTASSIUM AND HYDROCHLOROTHIAZIDE 12.5; 5 MG/1; MG/1
TABLET ORAL
Qty: 90 TABLET | Refills: 1 | Status: SHIPPED | OUTPATIENT
Start: 2024-12-15

## 2025-02-28 DIAGNOSIS — I10 ESSENTIAL HYPERTENSION: ICD-10-CM

## 2025-02-28 RX ORDER — AMLODIPINE BESYLATE 10 MG/1
TABLET ORAL
Qty: 90 TABLET | Refills: 3 | Status: SHIPPED | OUTPATIENT
Start: 2025-02-28

## 2025-05-12 DIAGNOSIS — I10 ESSENTIAL HYPERTENSION: ICD-10-CM

## 2025-05-12 RX ORDER — LOSARTAN POTASSIUM AND HYDROCHLOROTHIAZIDE 12.5; 5 MG/1; MG/1
TABLET ORAL
Qty: 90 TABLET | Refills: 0 | Status: SHIPPED | OUTPATIENT
Start: 2025-05-12

## 2025-05-13 DIAGNOSIS — I10 ESSENTIAL HYPERTENSION: Primary | ICD-10-CM

## 2025-06-09 ENCOUNTER — OFFICE VISIT (OUTPATIENT)
Dept: FAMILY MEDICINE CLINIC | Facility: CLINIC | Age: 75
End: 2025-06-09
Payer: COMMERCIAL

## 2025-06-09 ENCOUNTER — APPOINTMENT (OUTPATIENT)
Dept: LAB | Facility: MEDICAL CENTER | Age: 75
End: 2025-06-09
Payer: COMMERCIAL

## 2025-06-09 VITALS
HEIGHT: 63 IN | WEIGHT: 156 LBS | BODY MASS INDEX: 27.64 KG/M2 | TEMPERATURE: 97.5 F | HEART RATE: 68 BPM | OXYGEN SATURATION: 96 % | DIASTOLIC BLOOD PRESSURE: 78 MMHG | SYSTOLIC BLOOD PRESSURE: 168 MMHG

## 2025-06-09 DIAGNOSIS — I10 ESSENTIAL HYPERTENSION: ICD-10-CM

## 2025-06-09 DIAGNOSIS — R73.9 HYPERGLYCEMIA: ICD-10-CM

## 2025-06-09 DIAGNOSIS — I10 ESSENTIAL HYPERTENSION: Primary | ICD-10-CM

## 2025-06-09 LAB
ALBUMIN SERPL BCG-MCNC: 4.8 G/DL (ref 3.5–5)
ALP SERPL-CCNC: 97 U/L (ref 34–104)
ALT SERPL W P-5'-P-CCNC: 13 U/L (ref 7–52)
ANION GAP SERPL CALCULATED.3IONS-SCNC: 11 MMOL/L (ref 4–13)
AST SERPL W P-5'-P-CCNC: 15 U/L (ref 13–39)
BASOPHILS # BLD AUTO: 0.05 THOUSANDS/ÂΜL (ref 0–0.1)
BASOPHILS NFR BLD AUTO: 1 % (ref 0–1)
BILIRUB SERPL-MCNC: 0.62 MG/DL (ref 0.2–1)
BUN SERPL-MCNC: 15 MG/DL (ref 5–25)
CALCIUM SERPL-MCNC: 9.6 MG/DL (ref 8.4–10.2)
CHLORIDE SERPL-SCNC: 99 MMOL/L (ref 96–108)
CHOLEST SERPL-MCNC: 177 MG/DL (ref ?–200)
CO2 SERPL-SCNC: 32 MMOL/L (ref 21–32)
CREAT SERPL-MCNC: 0.65 MG/DL (ref 0.6–1.3)
EOSINOPHIL # BLD AUTO: 0.08 THOUSAND/ÂΜL (ref 0–0.61)
EOSINOPHIL NFR BLD AUTO: 1 % (ref 0–6)
ERYTHROCYTE [DISTWIDTH] IN BLOOD BY AUTOMATED COUNT: 11.9 % (ref 11.6–15.1)
EST. AVERAGE GLUCOSE BLD GHB EST-MCNC: 111 MG/DL
GFR SERPL CREATININE-BSD FRML MDRD: 86 ML/MIN/1.73SQ M
GLUCOSE P FAST SERPL-MCNC: 92 MG/DL (ref 65–99)
HBA1C MFR BLD: 5.5 %
HCT VFR BLD AUTO: 44.6 % (ref 34.8–46.1)
HDLC SERPL-MCNC: 75 MG/DL
HGB BLD-MCNC: 14.8 G/DL (ref 11.5–15.4)
IMM GRANULOCYTES # BLD AUTO: 0.03 THOUSAND/UL (ref 0–0.2)
IMM GRANULOCYTES NFR BLD AUTO: 1 % (ref 0–2)
LDLC SERPL CALC-MCNC: 80 MG/DL (ref 0–100)
LYMPHOCYTES # BLD AUTO: 1.67 THOUSANDS/ÂΜL (ref 0.6–4.47)
LYMPHOCYTES NFR BLD AUTO: 26 % (ref 14–44)
MCH RBC QN AUTO: 32.7 PG (ref 26.8–34.3)
MCHC RBC AUTO-ENTMCNC: 33.2 G/DL (ref 31.4–37.4)
MCV RBC AUTO: 99 FL (ref 82–98)
MONOCYTES # BLD AUTO: 0.51 THOUSAND/ÂΜL (ref 0.17–1.22)
MONOCYTES NFR BLD AUTO: 8 % (ref 4–12)
NEUTROPHILS # BLD AUTO: 4.12 THOUSANDS/ÂΜL (ref 1.85–7.62)
NEUTS SEG NFR BLD AUTO: 63 % (ref 43–75)
NONHDLC SERPL-MCNC: 102 MG/DL
NRBC BLD AUTO-RTO: 0 /100 WBCS
PLATELET # BLD AUTO: 212 THOUSANDS/UL (ref 149–390)
PMV BLD AUTO: 11.3 FL (ref 8.9–12.7)
POTASSIUM SERPL-SCNC: 3.7 MMOL/L (ref 3.5–5.3)
PROT SERPL-MCNC: 7.1 G/DL (ref 6.4–8.4)
RBC # BLD AUTO: 4.52 MILLION/UL (ref 3.81–5.12)
SODIUM SERPL-SCNC: 142 MMOL/L (ref 135–147)
TRIGL SERPL-MCNC: 111 MG/DL (ref ?–150)
WBC # BLD AUTO: 6.46 THOUSAND/UL (ref 4.31–10.16)

## 2025-06-09 PROCEDURE — G2211 COMPLEX E/M VISIT ADD ON: HCPCS | Performed by: FAMILY MEDICINE

## 2025-06-09 PROCEDURE — 36415 COLL VENOUS BLD VENIPUNCTURE: CPT

## 2025-06-09 PROCEDURE — 80053 COMPREHEN METABOLIC PANEL: CPT

## 2025-06-09 PROCEDURE — 99213 OFFICE O/P EST LOW 20 MIN: CPT | Performed by: FAMILY MEDICINE

## 2025-06-09 PROCEDURE — 80061 LIPID PANEL: CPT

## 2025-06-09 PROCEDURE — 85025 COMPLETE CBC W/AUTO DIFF WBC: CPT

## 2025-06-09 PROCEDURE — 83036 HEMOGLOBIN GLYCOSYLATED A1C: CPT

## 2025-06-09 NOTE — ASSESSMENT & PLAN NOTE
Pt advised to get a BP cuff to check BP at home, likely stress response  Orders:    CBC and differential; Future    Comprehensive metabolic panel; Future    Lipid panel; Future
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
## 2025-06-10 ENCOUNTER — RESULTS FOLLOW-UP (OUTPATIENT)
Dept: FAMILY MEDICINE CLINIC | Facility: CLINIC | Age: 75
End: 2025-06-10

## 2025-07-24 DIAGNOSIS — I10 ESSENTIAL HYPERTENSION: ICD-10-CM

## 2025-07-25 RX ORDER — LOSARTAN POTASSIUM AND HYDROCHLOROTHIAZIDE 12.5; 5 MG/1; MG/1
TABLET ORAL
Qty: 90 TABLET | Refills: 1 | Status: SHIPPED | OUTPATIENT
Start: 2025-07-25

## 2025-08-19 ENCOUNTER — VBI (OUTPATIENT)
Dept: ADMINISTRATIVE | Facility: OTHER | Age: 75
End: 2025-08-19

## (undated) DEVICE — 6617 IOBAN II PATIENT ISOLATION DRAPE 5/BX,4BX/CS: Brand: STERI-DRAPE™ IOBAN™ 2

## (undated) DEVICE — SUT VICRYL 0 CT-1 27 IN J260H

## (undated) DEVICE — CHLORAPREP HI-LITE 26ML ORANGE

## (undated) DEVICE — SUT VICRYL 2-0 CT-1 27 IN J259H

## (undated) DEVICE — DRESSING MEPILEX AG BORDER 4 X 4 IN

## (undated) DEVICE — LIGHT HANDLE COVER SLEEVE DISP BLUE STELLAR

## (undated) DEVICE — INTENDED FOR TISSUE SEPARATION, AND OTHER PROCEDURES THAT REQUIRE A SHARP SURGICAL BLADE TO PUNCTURE OR CUT.: Brand: BARD-PARKER SAFETY BLADES SIZE 10, STERILE

## (undated) DEVICE — SCD SEQUENTIAL COMPRESSION COMFORT SLEEVE MEDIUM KNEE LENGTH: Brand: KENDALL SCD

## (undated) DEVICE — 2.5MM REAMING ROD WITH BALL TIP/950MM-STERILE

## (undated) DEVICE — ACE WRAP 6 IN UNSTERILE

## (undated) DEVICE — GLOVE INDICATOR PI UNDERGLOVE SZ 8.5 BLUE

## (undated) DEVICE — GLOVE SRG BIOGEL 8.5

## (undated) DEVICE — 3.2MM GUIDE WIRE 400MM

## (undated) DEVICE — STERILE ORIF HIP PACK: Brand: CARDINAL HEALTH

## (undated) DEVICE — 4.0MM THREE-FLUTED DRILL BIT QC/195MM

## (undated) DEVICE — ARTHROSCOPY FLOOR MAT

## (undated) DEVICE — DRESSING MEPILEX AG BORDER 4 X 8 IN